# Patient Record
Sex: MALE | Race: WHITE | Employment: FULL TIME | URBAN - METROPOLITAN AREA
[De-identification: names, ages, dates, MRNs, and addresses within clinical notes are randomized per-mention and may not be internally consistent; named-entity substitution may affect disease eponyms.]

---

## 2017-03-10 ENCOUNTER — OFFICE VISIT (OUTPATIENT)
Dept: INTERNAL MEDICINE CLINIC | Age: 42
End: 2017-03-10

## 2017-03-10 VITALS
TEMPERATURE: 98.6 F | OXYGEN SATURATION: 95 % | DIASTOLIC BLOOD PRESSURE: 82 MMHG | HEIGHT: 68 IN | SYSTOLIC BLOOD PRESSURE: 124 MMHG | BODY MASS INDEX: 36.49 KG/M2 | RESPIRATION RATE: 16 BRPM | HEART RATE: 71 BPM | WEIGHT: 240.8 LBS

## 2017-03-10 DIAGNOSIS — M67.431 GANGLION CYST OF WRIST, RIGHT: Primary | ICD-10-CM

## 2017-03-10 NOTE — PROGRESS NOTES
Written by Chrissy Barajas, as dictated by Dr. Tierra Hicks MD.    Cydney Seymour is a 35 y.o. male. HPI  The patient comes in today C/O lump on his R wrist. This summer it first appeared and then after about a week the cyst went away. It was not tender at that time. It came back about 2 weeks ago and it has not gone away. He has noticed mild tenderness this time. The tenderness only occurs when he is exercising. He denies any pressure sxs such as tinging in his fingers. Patient Active Problem List   Diagnosis Code    Obesity (BMI 30-39. 9) E66.9        Current Outpatient Prescriptions on File Prior to Visit   Medication Sig Dispense Refill    ibuprofen (MOTRIN IB) 200 mg tablet Take  by mouth. No current facility-administered medications on file prior to visit. No Known Allergies    History reviewed. No pertinent past medical history. History reviewed. No pertinent surgical history. Family History   Problem Relation Age of Onset    Hypertension Father        Social History     Social History    Marital status:      Spouse name: N/A    Number of children: N/A    Years of education: N/A     Occupational History    Not on file. Social History Main Topics    Smoking status: Never Smoker    Smokeless tobacco: Not on file    Alcohol use Yes      Comment: occationally    Drug use: No    Sexual activity: Yes     Partners: Female     Other Topics Concern    Not on file     Social History Narrative         Review of Systems   HENT: Negative for congestion. Eyes: Negative for blurred vision and discharge. Gastrointestinal: Negative for abdominal pain and heartburn. Musculoskeletal: Negative for joint pain and myalgias. Neurological: Negative for dizziness, tingling, sensory change and headaches.      Visit Vitals    /82 (BP 1 Location: Right arm, BP Patient Position: Sitting)    Pulse 71    Temp 98.6 °F (37 °C) (Oral)  Resp 16    Ht 5' 8\" (1.727 m)    Wt 240 lb 12.8 oz (109.2 kg)    SpO2 95%    BMI 36.61 kg/m2     Physical Exam   Constitutional: He is oriented to person, place, and time. He appears well-nourished. HENT:   Right Ear: External ear normal.   Left Ear: External ear normal.   Mouth/Throat: Oropharynx is clear and moist.   Eyes: Conjunctivae and EOM are normal.   Neck: Normal range of motion. Neck supple. Cardiovascular: Normal rate and regular rhythm. Pulmonary/Chest: Effort normal and breath sounds normal.   Abdominal: Soft. Bowel sounds are normal.   Musculoskeletal: He exhibits no edema. ganglionic cyst on R wrist    Neurological: He is alert and oriented to person, place, and time. Psychiatric: He has a normal mood and affect. Nursing note and vitals reviewed. ASSESSMENT and PLAN    ICD-10-CM ICD-9-CM    1. Ganglion cyst of wrist, right M67.431 727.41 I discussed that I do not suggest any surgery unless he has pressure sxs. I suggested that when it gets bigger, then I want him to do hot compresses. This plan was reviewed with the patient and patient agrees. All questions were answered. This scribe documentation was reviewed by me and accurately reflects the examination and decisions made by me. This note will not be viewable in [address]

## 2017-03-10 NOTE — MR AVS SNAPSHOT
Visit Information Date & Time Provider Department Dept. Phone Encounter #  
 3/10/2017 12:15 PM Alberto Mock MD [facility] Internal Medicine [phone] [id number] Upcoming Health Maintenance Date Due DTaP/Tdap/Td series (1 - Tdap) 7/5/1994 Allergies as of 3/10/2017  Review Complete On: 3/10/2017 By: Alberto Mock MD  
 No Known Allergies Current Immunizations  Never Reviewed No immunizations on file. Not reviewed this visit You Were Diagnosed With   
  
 Codes Comments Ganglion cyst of wrist, right    -  Primary ICD-10-CM: R44.002 ICD-9-CM: 727.41 Vitals BP Pulse Temp Resp Height(growth percentile) Weight(growth percentile) 124/82 (BP 1 Location: Right arm, BP Patient Position: Sitting) 71 98.6 °F (37 °C) (Oral) 16 5' 8\" (1.727 m) 240 lb 12.8 oz (109.2 kg) SpO2 BMI Smoking Status 95% 36.61 kg/m2 Never Smoker BMI and BSA Data Body Mass Index Body Surface Area  
 36.61 kg/m 2 2.29 m 2 Preferred Pharmacy Pharmacy Name Phone [facility] # [address], [address] [phone] Your Updated Medication List  
  
   
This list is accurate as of: 3/10/17  1:10 PM.  Always use your most recent med list.  
  
  
  
  
 MOTRIN  mg tablet Generic drug:  ibuprofen Take  by mouth. Introducing [address]! Jorje Cartagena introduces [employer] patient portal. Now you can access parts of your medical record, email your doctor's office, and request medication refills online. 1. In your internet browser, go to [email or web address] 2. Click on the First Time User? Click Here link in the Sign In box. You will see the New Member Sign Up page. 3. Enter your [employer] Access Code exactly as it appears below. You will not need to use this code after youve completed the sign-up process.  If you do not sign up before the expiration date, you must request a new code. 
 
· [employer] Access Code: [email or web address] Expires: 6/8/2017  1:10 PM 
 
4. Enter the last four digits of your Social Security Number (xxxx) and Date of Birth (mm/dd/yyyy) as indicated and click Submit. You will be taken to the next sign-up page. 5. Create a [employer] ID. This will be your [employer] login ID and cannot be changed, so think of one that is secure and easy to remember. 6. Create a [employer] password. You can change your password at any time. 7. Enter your Password Reset Question and Answer. This can be used at a later time if you forget your password. 8. Enter your e-mail address. You will receive e-mail notification when new information is available in [address] 9. Click Sign Up. You can now view and download portions of your medical record. 10. Click the Download Summary menu link to download a portable copy of your medical information. If you have questions, please visit the Frequently Asked Questions section of the [employer] website. Remember, [employer] is NOT to be used for urgent needs. For medical emergencies, dial 911. Now available from your iPhone and Android! Please provide this summary of care documentation to your next provider. Your primary care clinician is listed as Angelica Tan. If you have any questions after today's visit, please call [phone].

## 2017-03-10 NOTE — PROGRESS NOTES
Chief Complaint   Patient presents with    Other     patient has place on right wrist that is one cm and is mobil. patient states that it comes and goes.

## 2017-06-23 ENCOUNTER — OFFICE VISIT (OUTPATIENT)
Dept: INTERNAL MEDICINE CLINIC | Age: 42
End: 2017-06-23

## 2017-06-23 VITALS
BODY MASS INDEX: 36.37 KG/M2 | WEIGHT: 240 LBS | SYSTOLIC BLOOD PRESSURE: 130 MMHG | TEMPERATURE: 98 F | RESPIRATION RATE: 20 BRPM | DIASTOLIC BLOOD PRESSURE: 88 MMHG | OXYGEN SATURATION: 98 % | HEART RATE: 89 BPM | HEIGHT: 68 IN

## 2017-06-23 DIAGNOSIS — Z00.00 ROUTINE PHYSICAL EXAMINATION: Primary | ICD-10-CM

## 2017-06-23 DIAGNOSIS — E66.9 OBESITY (BMI 30-39.9): ICD-10-CM

## 2017-06-23 NOTE — MR AVS SNAPSHOT
Visit Information Date & Time Provider Department Dept. Phone Encounter #  
 6/23/2017  2:40 PM [facility] Internal Medicine [phone] [id number] Upcoming Health Maintenance Date Due DTaP/Tdap/Td series (1 - Tdap) 7/5/1994 INFLUENZA AGE 9 TO ADULT 8/1/2017 Allergies as of 6/23/2017  Review Complete On: 6/23/2017 By: Roberto Peterson NP No Known Allergies Current Immunizations  Never Reviewed No immunizations on file. Not reviewed this visit You Were Diagnosed With   
  
 Codes Comments Routine physical examination    -  Primary ICD-10-CM: Z00.00 ICD-9-CM: V70.0 Obesity (BMI 30-39. 9)     ICD-10-CM: E66.9 ICD-9-CM: 278.00 Vitals BP Pulse Temp Resp Height(growth percentile) Weight(growth percentile) 130/88 89 98 °F (36.7 °C) (Oral) 20 5' 8\" (1.727 m) 240 lb (108.9 kg) SpO2 BMI Smoking Status 98% 36.49 kg/m2 Never Smoker BMI and BSA Data Body Mass Index Body Surface Area  
 36.49 kg/m 2 2.29 m 2 Preferred Pharmacy Pharmacy Name Phone [id number]  [facility], [address] [facility] [phone] Your Updated Medication List  
  
   
This list is accurate as of: 6/23/17  3:58 PM.  Always use your most recent med list.  
  
  
  
  
 MOTRIN  mg tablet Generic drug:  ibuprofen Take  by mouth. Introducing [facility]! Gentry Estrella introduces [employer] patient portal. Now you can access parts of your medical record, email your doctor's office, and request medication refills online. 1. In your internet browser, go to [email or web address] 2. Click on the First Time User? Click Here link in the Sign In box. You will see the New Member Sign Up page. 3. Enter your [employer] Access Code exactly as it appears below. You will not need to use this code after youve completed the sign-up process.  If you do not sign up before the expiration date, you must request a new code. · [employer] Access Code: [email or web address] Expires: 9/21/2017  2:51 PM 
 
4. Enter the last four digits of your Social Security Number (xxxx) and Date of Birth (mm/dd/yyyy) as indicated and click Submit. You will be taken to the next sign-up page. 5. Create a [employer] ID. This will be your [employer] login ID and cannot be changed, so think of one that is secure and easy to remember. 6. Create a [employer] password. You can change your password at any time. 7. Enter your Password Reset Question and Answer. This can be used at a later time if you forget your password. 8. Enter your e-mail address. You will receive e-mail notification when new information is available in [address] 9. Click Sign Up. You can now view and download portions of your medical record. 10. Click the Download Summary menu link to download a portable copy of your medical information. If you have questions, please visit the Frequently Asked Questions section of the [employer] website. Remember, [employer] is NOT to be used for urgent needs. For medical emergencies, dial 911. Now available from your iPhone and Android! Please provide this summary of care documentation to your next provider. Your primary care clinician is listed as Keren Sanchez. If you have any questions after today's visit, please call [phone].

## 2017-06-23 NOTE — PATIENT INSTRUCTIONS
Thank you for choosing [facility]. We know you have many options when it comes to your healthcare; we appreciate that you picked us. Our goal is to provide exceptional  service and world class care for every patient. You may receive a survey in the mail or by email asking for your feedback. Please take a few minutes to share your thoughts about your recent visit. Your comments helps us understand what we do well and what we can do better. To ensure confidentiality, this survey is administered by an independent third-party, [address] [place] [address] participation will help us to improve the quality of care that we provide to you, your family, friends, and neighbors. Thank you! Body Mass Index: Care Instructions  Your Care Instructions    Body mass index (BMI) can help you see if your weight is raising your risk for health problems. It uses a formula to compare how much you weigh with how tall you are. · A BMI lower than 18.5 is considered underweight. · A BMI between 18.5 and 24.9 is considered healthy. · A BMI between 25 and 29.9 is considered overweight. A BMI of 30 or higher is considered obese. If your BMI is in the normal range, it means that you have a lower risk for weight-related health problems. If your BMI is in the overweight or obese range, you may be at increased risk for weight-related health problems, such as high blood pressure, heart disease, stroke, arthritis or joint pain, and diabetes. If your BMI is in the underweight range, you may be at increased risk for health problems such as fatigue, lower protection (immunity) against illness, muscle loss, bone loss, hair loss, and hormone problems. BMI is just one measure of your risk for weight-related health problems. You may be at higher risk for health problems if you are not active, you eat an unhealthy diet, or you drink too much alcohol or use tobacco products.   Follow-up care is a key part of your treatment and safety. Be sure to make and go to all appointments, and call your doctor if you are having problems. It's also a good idea to know your test results and keep a list of the medicines you take. How can you care for yourself at home? · Practice healthy eating habits. This includes eating plenty of fruits, vegetables, whole grains, lean protein, and low-fat dairy. · If your doctor recommends it, get more exercise. Walking is a good choice. Bit by bit, increase the amount you walk every day. Try for at least 30 minutes on most days of the week. · Do not smoke. Smoking can increase your risk for health problems. If you need help quitting, talk to your doctor about stop-smoking programs and medicines. These can increase your chances of quitting for good. · Limit alcohol to 2 drinks a day for men and 1 drink a day for women. Too much alcohol can cause health problems. If you have a BMI higher than 25  · Your doctor may do other tests to check your risk for weight-related health problems. This may include measuring the distance around your waist. A waist measurement of more than 40 inches in men or 35 inches in women can increase the risk of weight-related health problems. · Talk with your doctor about steps you can take to stay healthy or improve your health. You may need to make lifestyle changes to lose weight and stay healthy, such as changing your diet and getting regular exercise. If you have a BMI lower than 18.5  · Your doctor may do other tests to check your risk for health problems. · Talk with your doctor about steps you can take to stay healthy or improve your health. You may need to make lifestyle changes to gain or maintain weight and stay healthy, such as getting more healthy foods in your diet and doing exercises to build muscle. Where can you learn more? Go to http://ronaldo-jackie.info/.   Enter S176 in the search box to learn more about \"Body Mass Index: Care Instructions. \"  Current as of: January 23, 2017  Content Version: 11.3  © 9639-3034 [employer]. Care instructions adapted under license by [employer] (which disclaims liability or warranty for this information). If you have questions about a medical condition or this instruction, always ask your healthcare professional. [address] any warranty or liability for your use of this information. Learning About Low-Carbohydrate Diets for Weight Loss  What is a low-carbohydrate diet? Low-carb diets avoid foods that are high in carbohydrate. These high-carb foods include pasta, bread, rice, cereal, fruits, and starchy vegetables. Instead, these diets usually have you eat foods that are high in fat and protein. Many people lose weight quickly on a low-carb diet. But the early weight loss is water. People on this diet often gain the weight back after they start eating carbs again. Not all diet plans are safe or work well. A lot of the evidence shows that low-carb diets aren't healthy. That's because these diets often don't include healthy foods like fruits and vegetables. Losing weight safely means balancing protein, fat, and carbs with every meal and snack. And low-carb diets don't always provide the vitamins, minerals, and fiber you need. If you have a serious medical condition, talk to your doctor before you try any diet. These conditions include kidney disease, heart disease, type 2 diabetes, high cholesterol, and high blood pressure. If you are pregnant, it may not be safe for your baby if you are on a low-carb diet. How can you lose weight safely? You might have heard that a diet plan helped another person lose weight. But that doesn't mean that it will work for you. It is very hard to stay on a diet that includes lots of big changes in your eating habits.  If you want to get to a healthy weight and stay there, making healthy lifestyle changes will often work better than dieting. These steps can help. · Make a plan for change. Work with your doctor to create a plan that is right for you. · See a dietitian. He or she can show you how to make healthy changes in your eating habits. · Manage stress. If you have a lot of stress in your life, it can be hard to focus on making healthy changes to your daily habits. · Track your food and activity. You are likely to do better at losing weight if you keep track of what you eat and what you do. Follow-up care is a key part of your treatment and safety. Be sure to make and go to all appointments, and call your doctor if you are having problems. It's also a good idea to know your test results and keep a list of the medicines you take. Where can you learn more? Go to http://ronaldo-jackie.info/. Enter A121 in the search box to learn more about \"Learning About Low-Carbohydrate Diets for Weight Loss. \"  Current as of: December 8, 2016  Content Version: 11.3  © 6414-7738 [employer]. Care instructions adapted under license by [employer] (which disclaims liability or warranty for this information). If you have questions about a medical condition or this instruction, always ask your healthcare professional. [address] any warranty or liability for your use of this information. When You Are Overweight: Care Instructions  Your Care Instructions  If you're overweight, your doctor may recommend that you make changes in your eating and exercise habits. Being overweight can lead to serious health problems, such as high blood pressure, heart disease, type 2 diabetes, and arthritis, or it can make these problems worse. Eating a healthy diet and being more active can help you reach and stay at a healthy weight. You dont have to make huge changes all at once. Start by making small changes in your eating and exercise habits.  To lose weight, you need to burn more calories than you take in. You can do this by eating healthy foods in reasonable amounts and becoming more active every day. Follow-up care is a key part of your treatment and safety. Be sure to make and go to all appointments, and call your doctor if you are having problems. It's also a good idea to know your test results and keep a list of the medicines you take. How can you care for yourself at home? · Improve your eating habits. You'll be more successful if you work on changing one eating habit at a time. All foods, if eaten in moderation, can be part of healthy eating. Remember to:  [facility] a variety of foods from each food group. Include grains, vegetables, fruits, dairy, and protein foods. ¨ Limit foods high in fat, sugar, and calories. ¨ Eat slowly. And don't do anything else, such as watch TV, while you are eating. ¨ Pay attention to portion sizes. Put your food on a smaller plate. ¨ Plan your meals ahead of time. You'll be less likely to grab something that's not as healthy. · Get active. Regular activity can help you feel better, have more energy, and burn more calories. If you haven't been active, start slowly. Start with at least 30 minutes of moderate activity on most days of the week. Then gradually increase the amount of activity. Try for 60 or 90 minutes a day, at least 5 days a week. There are a lot of ways to fit activity into your life. You can:  ¨ Walk or bike to the store. Or walk with a friend, or walk the dog. ¨ Mow the lawn, rake leaves, shovel snow, or do some gardening. ¨ Use the stairs instead of the elevator, at least for a few floors. · Change your thinking. Your thoughts have a lot to do with how you feel and what you do. When you're trying to reach a healthy weight, changing how you think about certain things may help. Here are some ideas:  ¨ Don't compare yourself to others. Healthy bodies come in all shapes and sizes. ¨ Pay attention to how hungry or full you feel.  When you eat, be aware of why you're eating and how much you're eating. ¨ Focus on improving your health instead of dieting. Dieting almost never works over the long term. · Ask your doctor about other health professionals who can help you reach a healthy weight. ¨ A dietitian can help you make healthy changes in your diet. ¨ An exercise specialist or  can help you develop a safe and effective exercise program.  ¨ A counselor or psychiatrist can help you cope with issues such as depression, anxiety, or family problems that can make it hard to focus on reaching a healthy weight. · Get support from your family, your doctor, your friends, a support groupand support yourself. Where can you learn more? Go to http://ronaldoPingCo.comjackie.info/. Enter P631 in the search box to learn more about \"When You Are Overweight: Care Instructions. \"  Current as of: October 13, 2016  Content Version: 11.3  © 7322-2127 [employer]. Care instructions adapted under license by [facility] (which disclaims liability or warranty for this information). If you have questions about a medical condition or this instruction, always ask your healthcare professional. [place] [address] any warranty or liability for your use of this information. Learning About Obesity  What is obesity? Obesity means having so much body fat that your health is in danger. Having too much body fat can lead to type 2 diabetes, heart disease, high blood pressure, arthritis, sleep apnea, and stroke. Even if you don't feel bad now, think about these health risks. Do they seem like a good reason to start on a new path toward a healthier weight? Or do you have another personal, powerful reason for wanting to lose weight? Whatever it is, keep it in mind. It can be hard to change eating habits and exercise habits. But with your own reason and plan, you can do it.   How do you know if your weight is in the obesity range? To know if your weight is in the obesity range, your doctor looks at your body mass index (BMI) and waist size. Your BMI is a number that is calculated from your weight and your height. To figure your BMI for yourself, get a BMI table from your doctor or use an online tool, such as http://www.[employer]/ on the [employer] of [employer]. What causes obesity? When you take in more calories than you burn off, you gain weight. How you eat, how active you are, and other things affect how your body uses calories and whether you gain weight. If you have family members who have too much body fat, you may have inherited a tendency to gain weight. And your family also helps form your eating and lifestyle habits, which can lead to obesity. Also, our busy lives make it harder to plan and cook healthy meals. For many of us, it's easier to reach for prepared foods, go out to eat, or go to the drive-through. But these foods are often high in saturated fat and calories. Portions are often too large. What can you do to reach a healthy weight? Focus on health, not diets. Diets are hard to stay on and don't work in the long run. It is very hard to stay with a diet that includes lots of big changes in your eating habits. Instead of a diet, focus on lifestyle changes that will improve your health and achieve the right balance of energy and calories. To lose weight, you need to burn more calories than you take in. You can do it by eating healthy foods in reasonable amounts and becoming more active, even a little bit every day. Making small changes over time can add up to a lot. Make a plan for change. Many people have found that naming their reasons for change and staying focused on their plan can make a big difference. Work with your doctor to create a plan that is right for you.   · Ask yourself: Benjamin Ohms are my personal, most powerful reasons for wanting this change? What will my life look like when I've made the change? \"  · Set your long-term goal. Make it specific, such as \"I will lose x pounds. \"  · Break your long-term goal into smaller, short-term goals. Make these small steps specific and within your reach, things you know you can do. These steps are what keep you going from day to day. How can you stay on your plan for change? Be ready. Choose to start during a time when there are few events that might trigger slip-ups, like holidays, social events, and high-stress periods. Decide on your first few steps. Most people have more success when they make small changes, one step at a time. For example, you might switch a daily candy bar to a piece of fruit, walk 10 minutes more, or add more vegetables to a meal.  Line up your support people. Make sure you're not going to be alone as you make this change. Connect with people who understand how important it is to you. Ask family members and friends for help in keeping with your plan. And think about who could make it harder for you, and how to handle them. Try tracking. People who keep track of what they eat, feel, and do are better at losing weight. Try writing down things like:  · What and how much you eat. · How you feel before and after each meal.  · Details about each meal (like eating out or at home, eating alone, or with friends or family). · What you do to be active. Look and plan. As you track, look for patterns that you may want to change. Take note of:  · When you eat and whether you skip meals. · How often you eat out. · How many fruits and vegetables you eat. · When you eat beyond feeling full. · When and why you eat for reasons other than being hungry. When you stray from your plan, don't get upset. Figure out what made you slip up and how you can fix it. Can you take medicines or have surgery to lose weight?   Before your doctor will prescribe medicines or surgery, he or she will probably want you to be more active and follow your healthy eating plan for a period of time. These habits are key lifelong changes for managing your weight, with or without other medical treatment. And these changes can help you avoid weight-related health problems. Follow-up care is a key part of your treatment and safety. Be sure to make and go to all appointments, and call your doctor if you are having problems. It's also a good idea to know your test results and keep a list of the medicines you take. Where can you learn more? Go to http://ronaldo-jackie.info/. Enter N111 in the search box to learn more about \"Learning About Obesity. \"  Current as of: October 13, 2016  Content Version: 11.3  © 8148-3683 [employer]. Care instructions adapted under license by [employer] (which disclaims liability or warranty for this information). If you have questions about a medical condition or this instruction, always ask your healthcare professional. [place] [address] any warranty or liability for your use of this information. Starting a Weight Loss Plan: Care Instructions  Your Care Instructions  If you are thinking about losing weight, it can be hard to know where to start. Your doctor can help you set up a weight loss plan that best meets your needs. You may want to take a class on nutrition or exercise, or join a weight loss support group. If you have questions about how to make changes to your eating or exercise habits, ask your doctor about seeing a registered dietitian or an exercise specialist.  It can be a big challenge to lose weight. But you do not have to make huge changes at once. Make small changes, and stick with them. When those changes become habit, add a few more changes. If you do not think you are ready to make changes right now, try to pick a date in the future.  Make an appointment to see your doctor to discuss whether the time is right for you to start a plan. Follow-up care is a key part of your treatment and safety. Be sure to make and go to all appointments, and call your doctor if you are having problems. Its also a good idea to know your test results and keep a list of the medicines you take. How can you care for yourself at home? · Set realistic goals. Many people expect to lose much more weight than is likely. A weight loss of 5% to 10% of your body weight may be enough to improve your health. · Get family and friends involved to provide support. Talk to them about why you are trying to lose weight, and ask them to help. They can help by participating in exercise and having meals with you, even if they may be eating something different. · Find what works best for you. If you do not have time or do not like to cook, a program that offers meal replacement bars or shakes may be better for you. Or if you like to prepare meals, finding a plan that includes daily menus and recipes may be best.  · Ask your doctor about other health professionals who can help you achieve your weight loss goals. ¨ A dietitian can help you make healthy changes in your diet. ¨ An exercise specialist or  can help you develop a safe and effective exercise program.  ¨ A counselor or psychiatrist can help you cope with issues such as depression, anxiety, or family problems that can make it hard to focus on weight loss. · Consider joining a support group for people who are trying to lose weight. Your doctor can suggest groups in your area. Where can you learn more? Go to http://ronaldo-jackie.info/. Enter G986 in the search box to learn more about \"Starting a Weight Loss Plan: Care Instructions. \"  Current as of: October 13, 2016  Content Version: 11.3  © 3479-8227 [employer]. Care instructions adapted under license by [employer] (which disclaims liability or warranty for this information).  If you have questions about a medical condition or this instruction, always ask your healthcare professional. [address] any warranty or liability for your use of this information. Well Visit, Ages 25 to 48: Care Instructions  Your Care Instructions  Physical exams can help you stay healthy. Your doctor has checked your overall health and may have suggested ways to take good care of yourself. He or she also may have recommended tests. At home, you can help prevent illness with healthy eating, regular exercise, and other steps. Follow-up care is a key part of your treatment and safety. Be sure to make and go to all appointments, and call your doctor if you are having problems. It's also a good idea to know your test results and keep a list of the medicines you take. How can you care for yourself at home? · Reach and stay at a healthy weight. This will lower your risk for many problems, such as obesity, diabetes, heart disease, and high blood pressure. · Get at least 30 minutes of physical activity on most days of the week. Walking is a good choice. You also may want to do other activities, such as running, swimming, cycling, or playing tennis or team sports. Discuss any changes in your exercise program with your doctor. · Do not smoke or allow others to smoke around you. If you need help quitting, talk to your doctor about stop-smoking programs and medicines. These can increase your chances of quitting for good. · Talk to your doctor about whether you have any risk factors for sexually transmitted infections (STIs). Having one sex partner (who does not have STIs and does not have sex with anyone else) is a good way to avoid these infections. · Use birth control if you do not want to have children at this time. Talk with your doctor about the choices available and what might be best for you. · Protect your skin from too much sun.  When you're outdoors from 10 a.m. to 4 p.m., stay in the shade or cover up with clothing and a hat with a wide brim. Wear sunglasses that block UV rays. Even when it's cloudy, put broad-spectrum sunscreen (SPF 30 or higher) on any exposed skin. · See a dentist one or two times a year for checkups and to have your teeth cleaned. · Wear a seat belt in the car. · Drink alcohol in moderation, if at all. That means no more than 2 drinks a day for men and 1 drink a day for women. Follow your doctor's advice about when to have certain tests. These tests can spot problems early. For everyone  · Cholesterol. Have the fat (cholesterol) in your blood tested after age 21. Your doctor will tell you how often to have this done based on your age, family history, or other things that can increase your risk for heart disease. · Blood pressure. Have your blood pressure checked during a routine doctor visit. Your doctor will tell you how often to check your blood pressure based on your age, your blood pressure results, and other factors. · Vision. Talk with your doctor about how often to have a glaucoma test.  · Diabetes. Ask your doctor whether you should have tests for diabetes. · Colon cancer. Have a test for colon cancer at age 48. You may have one of several tests. If you are younger than 48, you may need a test earlier if you have any risk factors. Risk factors include whether you already had a precancerous polyp removed from your colon or whether your parent, brother, sister, or child has had colon cancer. For women  · Breast exam and mammogram. Talk to your doctor about when you should have a clinical breast exam and a mammogram. Medical experts differ on whether and how often women under 50 should have these tests. Your doctor can help you decide what is right for you. · Pap test and pelvic exam. Begin Pap tests at age 24. A Pap test is the best way to find cervical cancer.  The test often is part of a pelvic exam. Ask how often to have this test.  · Tests for sexually transmitted infections (STIs). Ask whether you should have tests for STIs. You may be at risk if you have sex with more than one person, especially if your partners do not wear condoms. For men  · Tests for sexually transmitted infections (STIs). Ask whether you should have tests for STIs. You may be at risk if you have sex with more than one person, especially if you do not wear a condom. · Testicular cancer exam. Ask your doctor whether you should check your testicles regularly. · Prostate exam. Talk to your doctor about whether you should have a blood test (called a PSA test) for prostate cancer. Experts differ on whether and when men should have this test. Some experts suggest it if you are older than 39 and are -American or have a father or brother who got prostate cancer when he was younger than 72. When should you call for help? Watch closely for changes in your health, and be sure to contact your doctor if you have any problems or symptoms that concern you. Where can you learn more? Go to [email or web address] Enter P072 in the search box to learn more about \"Well Visit, Ages 25 to 48: Care Instructions. \"  Current as of: July 19, 2016  Content Version: 11.3  © 6590-9311 [employer]. Care instructions adapted under license by [employer] (which disclaims liability or warranty for this information). If you have questions about a medical condition or this instruction, always ask your healthcare professional. [place] [address] any warranty or liability for your use of this information.

## 2017-06-23 NOTE — PROGRESS NOTES
Subjective:   Michelle Pritchett is a 35 y.o. male presenting for his annual checkup. He is in his usual state of health. He exercises (cardio and weight) at least 3 times weekly and recently participated in a \"tough-mudder\" exercise program. He denies any symptoms at this time and is not fasting so he will return next week fasting for his labs. We will complete his physical form for work when he brings it in next week. Denies any concerns or symptoms. ROS:  Feeling well. No dyspnea or chest pain on exertion. No abdominal pain, change in bowel habits, black or bloody stools. No urinary tract or prostatic symptoms. No neurological complaints. Specific concerns today: None. Patient Active Problem List   Diagnosis Code    Obesity (BMI 30-39. 9) E66.9     Patient Active Problem List    Diagnosis Date Noted    Obesity (BMI 30-39.9) 05/09/2016     Current Outpatient Prescriptions   Medication Sig Dispense Refill    ibuprofen (MOTRIN IB) 200 mg tablet Take  by mouth. No Known Allergies  No past medical history on file. History reviewed. No pertinent surgical history.   Family History   Problem Relation Age of Onset    Hypertension Father      Social History   Substance Use Topics    Smoking status: Never Smoker    Smokeless tobacco: Never Used    Alcohol use Yes      Comment: occationally        Lab Results  Component Value Date/Time   WBC 6.7 05/09/2016 09:04 AM   HGB 15.9 05/09/2016 09:04 AM   HCT 46.1 05/09/2016 09:04 AM   PLATELET 791 21/05/2041 09:04 AM   MCV 88 05/09/2016 09:04 AM     Lab Results  Component Value Date/Time   Glucose 100 05/09/2016 09:04 AM   LDL, calculated 158 05/09/2016 09:04 AM   Creatinine 0.85 05/09/2016 09:04 AM      Lab Results  Component Value Date/Time   Cholesterol, total 236 05/09/2016 09:04 AM   HDL Cholesterol 57 05/09/2016 09:04 AM   LDL, calculated 158 05/09/2016 09:04 AM   Triglyceride 104 05/09/2016 09:04 AM   Lab Results  Component Value Date/Time   ALT (SGPT) 28 05/09/2016 09:04 AM   AST (SGOT) 31 05/09/2016 09:04 AM   Alk. phosphatase 59 05/09/2016 09:04 AM   Bilirubin, total 0.5 05/09/2016 09:04 AM   Albumin 4.8 05/09/2016 09:04 AM   Protein, total 7.2 05/09/2016 09:04 AM   PLATELET 187 45/06/2895 09:04 AM       No results found for: INR, PTMR, PTP, PT1, PT2   Lab Results  Component Value Date/Time   GFR est non- 05/09/2016 09:04 AM   GFR est  05/09/2016 09:04 AM   Creatinine 0.85 05/09/2016 09:04 AM   BUN 15 05/09/2016 09:04 AM   Sodium 138 05/09/2016 09:04 AM   Potassium 4.8 05/09/2016 09:04 AM   Chloride 99 05/09/2016 09:04 AM   CO2 23 05/09/2016 09:04 AM   No results found for: PSA, Amber King, [id number], [id number], PSALTLab Results  Component Value Date/Time   TSH 1.900 05/09/2016 09:04 AM      Lab Results   Component Value Date/Time    Glucose 100 05/09/2016 09:04 AM     Patient Active Problem List   Diagnosis Code    Obesity (BMI 30-39. 9) E66.9     No Known Allergies  Current Outpatient Prescriptions on File Prior to Visit   Medication Sig Dispense Refill    ibuprofen (MOTRIN IB) 200 mg tablet Take  by mouth. No current facility-administered medications on file prior to visit. No past medical history on file. History reviewed. No pertinent surgical history. Social History     Social History    Marital status:      Spouse name: N/A    Number of children: N/A    Years of education: N/A     Occupational History    Not on file. Social History Main Topics    Smoking status: Never Smoker    Smokeless tobacco: Never Used    Alcohol use Yes      Comment: occationally    Drug use: No    Sexual activity: Yes     Partners: Female     Other Topics Concern    Not on file     Social History Narrative     Family History   Problem Relation Age of Onset    Hypertension Father      This note will not be viewable in [address]     If Narcotics or controlled substances were prescribed, I personally reviewed the patient  history. Objective:   Visit Vitals    /88    Pulse 89    Temp 98 °F (36.7 °C) (Oral)    Resp 20    Ht 5' 8\" (1.727 m)    Wt 240 lb (108.9 kg)    SpO2 98%    BMI 36.49 kg/m2     The patient appears well, alert, oriented x 3, in no distress. ENT normal.  Neck supple. No adenopathy or thyromegaly. SUNDAY. Lungs are clear, good air entry, no wheezes, rhonchi or rales. S1 and S2 normal, no murmurs, regular rate and rhythm. Abdomen is soft without tenderness, guarding, mass or organomegaly.  exam: no penile lesions or discharge, no testicular masses or tenderness, no hernias. Extremities show no edema, normal peripheral pulses. Neurological is normal without focal findings. Assessment/Plan:   [facility] adult male examination completed. Encouraged to lose weight, increase physical activity, follow low fat diet and continue exercise regimen. Routine labs ordered and he will return and labs drawn next week when fasting. Encouraged to call if any problems. Patient expressed understanding of instructions and agreement with treatment plan. ICD-10-CM ICD-9-CM    1. Routine physical examination Z00.00 V70.0    2. Obesity (BMI 30-39. 9) E66.9 278.00    .

## 2017-06-30 ENCOUNTER — DOCUMENTATION ONLY (OUTPATIENT)
Dept: INTERNAL MEDICINE CLINIC | Age: 42
End: 2017-06-30

## 2017-06-30 DIAGNOSIS — Z00.00 PHYSICAL EXAM: Primary | ICD-10-CM

## 2017-06-30 LAB
BILIRUB UR QL STRIP: NEGATIVE
GLUCOSE UR-MCNC: NEGATIVE MG/DL
KETONES P FAST UR STRIP-MCNC: NEGATIVE MG/DL
PH UR STRIP: 5.5 [PH] (ref 4.6–8)
PROT UR QL STRIP: NEGATIVE MG/DL
SP GR UR STRIP: 1.02 (ref 1–1.03)
UA UROBILINOGEN AMB POC: NORMAL (ref 0.2–1)
URINALYSIS CLARITY POC: CLEAR
URINALYSIS COLOR POC: YELLOW
URINE BLOOD POC: NEGATIVE
URINE LEUKOCYTES POC: NEGATIVE
URINE NITRITES POC: NEGATIVE

## 2017-06-30 NOTE — PROGRESS NOTES
Patient came in for labs, from physical on 6/23/2017. Patient had wellness physical physician form. Completed form made copy, gave to patient and sent copy to scan.

## 2017-07-01 LAB
25(OH)D3+25(OH)D2 SERPL-MCNC: 40.7 NG/ML (ref 30–100)
ALBUMIN SERPL-MCNC: 4.5 G/DL (ref 3.5–5.5)
ALBUMIN/GLOB SERPL: 1.8 {RATIO} (ref 1.2–2.2)
ALP SERPL-CCNC: 57 IU/L (ref 39–117)
ALT SERPL-CCNC: 32 IU/L (ref 0–44)
AST SERPL-CCNC: 31 IU/L (ref 0–40)
BILIRUB SERPL-MCNC: 0.6 MG/DL (ref 0–1.2)
BUN SERPL-MCNC: 21 MG/DL (ref 6–24)
BUN/CREAT SERPL: 23 (ref 9–20)
CALCIUM SERPL-MCNC: 9.2 MG/DL (ref 8.7–10.2)
CHLORIDE SERPL-SCNC: 103 MMOL/L (ref 96–106)
CHOLEST SERPL-MCNC: 220 MG/DL (ref 100–199)
CO2 SERPL-SCNC: 16 MMOL/L (ref 18–29)
CREAT SERPL-MCNC: 0.9 MG/DL (ref 0.76–1.27)
ERYTHROCYTE [DISTWIDTH] IN BLOOD BY AUTOMATED COUNT: 13.9 % (ref 12.3–15.4)
GLOBULIN SER CALC-MCNC: 2.5 G/DL (ref 1.5–4.5)
GLUCOSE SERPL-MCNC: 108 MG/DL (ref 65–99)
HCT VFR BLD AUTO: 42.2 % (ref 37.5–51)
HDLC SERPL-MCNC: 45 MG/DL
HGB BLD-MCNC: 14.6 G/DL (ref 12.6–17.7)
INTERPRETATION, 910389: NORMAL
LDLC SERPL CALC-MCNC: 156 MG/DL (ref 0–99)
MCH RBC QN AUTO: 30.5 PG (ref 26.6–33)
MCHC RBC AUTO-ENTMCNC: 34.6 G/DL (ref 31.5–35.7)
MCV RBC AUTO: 88 FL (ref 79–97)
PLATELET # BLD AUTO: 266 X10E3/UL (ref 150–379)
POTASSIUM SERPL-SCNC: 3.9 MMOL/L (ref 3.5–5.2)
PROT SERPL-MCNC: 7 G/DL (ref 6–8.5)
RBC # BLD AUTO: 4.78 X10E6/UL (ref 4.14–5.8)
SODIUM SERPL-SCNC: 140 MMOL/L (ref 134–144)
TRIGL SERPL-MCNC: 96 MG/DL (ref 0–149)
TSH SERPL DL<=0.005 MIU/L-ACNC: 1.89 UIU/ML (ref 0.45–4.5)
VLDLC SERPL CALC-MCNC: 19 MG/DL (ref 5–40)
WBC # BLD AUTO: 7.3 X10E3/UL (ref 3.4–10.8)

## 2018-06-28 ENCOUNTER — OFFICE VISIT (OUTPATIENT)
Dept: INTERNAL MEDICINE CLINIC | Age: 43
End: 2018-06-28

## 2018-06-28 VITALS
HEIGHT: 68 IN | RESPIRATION RATE: 16 BRPM | DIASTOLIC BLOOD PRESSURE: 78 MMHG | SYSTOLIC BLOOD PRESSURE: 130 MMHG | BODY MASS INDEX: 34.19 KG/M2 | TEMPERATURE: 98.5 F | WEIGHT: 225.6 LBS | OXYGEN SATURATION: 99 % | HEART RATE: 95 BPM

## 2018-06-28 DIAGNOSIS — Z23 NEED FOR VACCINE FOR TD (TETANUS-DIPHTHERIA): ICD-10-CM

## 2018-06-28 DIAGNOSIS — E66.9 OBESITY (BMI 30-39.9): ICD-10-CM

## 2018-06-28 DIAGNOSIS — Z00.00 PHYSICAL EXAM: Primary | ICD-10-CM

## 2018-06-28 DIAGNOSIS — R53.82 CHRONIC FATIGUE: ICD-10-CM

## 2018-06-28 DIAGNOSIS — R73.01 ELEVATED FASTING BLOOD SUGAR: ICD-10-CM

## 2018-06-28 RX ORDER — GLUCOSAMINE SULFATE 1500 MG
POWDER IN PACKET (EA) ORAL DAILY
COMMUNITY
End: 2020-03-19

## 2018-06-28 NOTE — PROGRESS NOTES
Chief Complaint   Patient presents with    Complete Physical     states that he has some concerns about weight, is working out and has lost weight but thinks maybe his testosterone level is low.  knows that he is burning more calories but has cut out fruits and carbs.

## 2018-06-28 NOTE — PROGRESS NOTES
Written by Severiano Vega, as dictated by Dr. Samantha Oglesby MD.    Lazarus Hillier is a 38 y.o. male. HPI  The patient presents today for a complete physical.    He was seen in the office c/o ganglion cyst on his right hand, which he treats at home with hot compresses. He weighs 225 lbs today and has lost 15 lbs from 240 lbs in 06/2017. He has been working out and mostly eliminating carbohydrates and sugars from his diet. Additionally, he eats mostly protein and healthy fats. He has only been eating fruits 2 times per week. He also has tried intermittent fasting, which is helping him to lose weight. On the weekends, he allows himself one day to stray from his diet and drinks 3-4 alcoholic drinks per week. He is still struggling to lose weight as he tends to stay between 230-240 lbs, but states he is now 30 lbs from his goal weight. He asked if he is eligible TRT to help him lose weight. He also reports feeling more tired now than when he was younger. He previously had sciatica pain, which has improved with his weight loss. He had a cold and sore throat that started on 06/25. On 06/26 he developed a runny nose and has been coughing and congested. He has noticed that his hands swell some when he eats sodium and has heavy cardio days. He denies heartburn, constipation, SOB, chest pain. He does not smoke now, but he quit smoking 10-12 years ago. Sometimes he feels fresh in the morning, but he does not know if he snores at night. He was taking a multivitamin last year, but stopped taking it. He has been taking fish oil, an OTC daily allergy medication, and vitamin D. He is travelling to [place] today for his son's wedding. He does not recall receiving a Tdap vaccine in the last 5 years. Patient Active Problem List   Diagnosis Code    Obesity (BMI 30-39. 9) E66.9        Current Outpatient Prescriptions on File Prior to Visit   Medication Sig Dispense Refill    ibuprofen (MOTRIN IB) 200 mg tablet Take  by mouth. No current facility-administered medications on file prior to visit. Family History   Problem Relation Age of Onset    Hypertension Father        Social History     Social History    Marital status:      Spouse name: N/A    Number of children: N/A    Years of education: N/A     Occupational History    Not on file. Social History Main Topics    Smoking status: Never Smoker    Smokeless tobacco: Never Used    Alcohol use Yes      Comment: occationally    Drug use: No    Sexual activity: Yes     Partners: Female     Other Topics Concern    Not on file     Social History Narrative       Review of Systems   Constitutional: Positive for malaise/fatigue. HENT: Positive for congestion and sore throat. Eyes: Negative for blurred vision and pain. Respiratory: Positive for cough. Negative for shortness of breath. Cardiovascular: Negative for chest pain and palpitations. Gastrointestinal: Negative for abdominal pain and heartburn. Genitourinary: Negative for frequency and urgency. Musculoskeletal: Negative for joint pain and myalgias. Neurological: Negative for dizziness, tingling, sensory change, weakness and headaches. Psychiatric/Behavioral: Negative for depression, memory loss and substance abuse. Visit Vitals    /78 (BP 1 Location: Right arm, BP Patient Position: Sitting)    Pulse 95    Temp 98.5 °F (36.9 °C) (Oral)    Resp 16    Ht 5' 8\" (1.727 m)    Wt 225 lb 9.6 oz (102.3 kg)    SpO2 99%    BMI 34.3 kg/m2       Physical Exam   Constitutional: He is oriented to person, place, and time. He appears well-developed. No distress. Obese   HENT:   Right Ear: External ear normal.   Left Ear: External ear normal.   Eyes: Conjunctivae and EOM are normal. Right eye exhibits no discharge. Left eye exhibits no discharge. Neck: Normal range of motion. Neck supple.    Cardiovascular: Normal rate and regular rhythm. Pulses:       Dorsalis pedis pulses are 2+ on the right side, and 1+ on the left side. Posterior tibial pulses are 2+ on the left side. Pulmonary/Chest: Effort normal. He has no wheezes. Coarse breath sounds in L middle lobe   Abdominal: Soft. Bowel sounds are normal. There is no tenderness. Lymphadenopathy:     He has no cervical adenopathy. Neurological: He is alert and oriented to person, place, and time. Reflex Scores:       Patellar reflexes are 2+ on the right side and 2+ on the left side. Skin: He is not diaphoretic. Psychiatric: He has a normal mood and affect. His behavior is normal.   Nursing note and vitals reviewed. ASSESSMENT and PLAN    ICD-10-CM ICD-9-CM    1. Physical exam Z00.00 V70.9 CBC W/O DIFF      METABOLIC PANEL, COMPREHENSIVE      LIPID PANEL      TSH 3RD GENERATION   2. Chronic fatigue R53.82 780.79 TESTOSTERONE, FREE & TOTAL   I will check his testosterone levels. 3. Elevated fasting blood sugar R73.01 790.21 HEMOGLOBIN A1C WITH EAG    Complete physical exam done. Basic labs drawn. Azithromycin prescription given to patient as he has had a cold and will be out of state this weekend. 4. Obesity (BMI 30-39. 9) E66.9 278.00 He is currently maintaining a healthy diet an exercising. I encourage him to maintain this lifestyle, but he should make sure to eat fruits such as apples and berries. Commended on his weight loss. 5. Need for vaccine for Td (tetanus-diphtheria) Z23 V06.5 diph,Pertuss,Acell,,Tet Vac-PF (ADACEL) 2 Lf-(2.5-5-3-5 mcg)-5Lf/0.5 mL susp sent to pharmacy. Tdap vaccine ordered. This plan was reviewed with the patient and patient agrees. All questions were answered. This scribe documentation was reviewed by me and accurately reflects the examination and decisions made by me.

## 2018-06-28 NOTE — MR AVS SNAPSHOT
[address] [facility] [address] [place] [address] [address] [facility] 
[phone] Patient: Bairon Tarango MRN: [id number] ZFD:[birth year] Visit Information Date & Time Provider Department Dept. Phone Encounter #  
 6/28/2018  8:30 AM Vijay Muniz MD [facility] Internal Medicine [phone] [id number] Upcoming Health Maintenance Date Due DTaP/Tdap/Td series (1 - Tdap) 7/5/1994 Influenza Age 5 to Adult 8/1/2018 Allergies as of 6/28/2018  Review Complete On: 6/28/2018 By: Marleny Oh LPN No Known Allergies Current Immunizations  Reviewed on 6/28/2018 Name Date Tdap 6/28/2018 Reviewed by Vijay Muniz MD on 6/28/2018 at  9:16 AM  
You Were Diagnosed With   
  
 Codes Comments Physical exam    -  Primary ICD-10-CM: Z00.00 ICD-9-CM: V70.9 Chronic fatigue     ICD-10-CM: R53.82 
ICD-9-CM: 780.79 Elevated fasting blood sugar     ICD-10-CM: R73.01 
ICD-9-CM: 790.21 Obesity (BMI 30-39. 9)     ICD-10-CM: E66.9 ICD-9-CM: 278.00 Need for vaccine for Td (tetanus-diphtheria)     ICD-10-CM: Mer  ICD-9-CM: V06.5 Vitals BP Pulse Temp Resp Height(growth percentile) Weight(growth percentile) 130/78 (BP 1 Location: Right arm, BP Patient Position: Sitting) 95 98.5 °F (36.9 °C) (Oral) 16 5' 8\" (1.727 m) 225 lb 9.6 oz (102.3 kg) SpO2 BMI Smoking Status 99% 34.3 kg/m2 Never Smoker BMI and BSA Data Body Mass Index Body Surface Area  
 34.3 kg/m 2 2.22 m 2 Preferred Pharmacy Pharmacy Name [id number] - [place]m, [address] [facility] [phone] Your Updated Medication List  
  
   
This list is accurate as of 6/28/18  9:42 AM.  Always use your most recent med list.  
  
  
  
  
 diph,Pertuss(Acell),Tet Vac-PF 2 Lf-(2.5-5-3-5 mcg)-5Lf/0.5 mL susp Commonly known as:  ADACEL  
0.5 mL by IntraMUSCular route once for 1 dose. FISH -160-1,000 mg Cap Generic drug:  omega 3-dha-epa-fish oil Take  by mouth. MOTRIN  mg tablet Generic drug:  ibuprofen Take  by mouth. VITAMIN D3 1,000 unit Cap Generic drug:  cholecalciferol Take  by mouth daily. Prescriptions Sent to Pharmacy Refills diph,Pertuss,Acell,,Tet Vac-PF (ADACEL) 2 Lf-(2.5-5-3-5 mcg)-5Lf/0.5 mL susp 0 Si.5 mL by IntraMUSCular route once for 1 dose. Class: Normal  
 Pharmacy: [address] [facility] #: [phone] Route: IntraMUSCular We Performed the Following CBC W/O DIFF [78481 CPT(R)] HEMOGLOBIN A1C WITH EAG [44590 CPT(R)] LIPID PANEL [99657 CPT(R)] METABOLIC PANEL, COMPREHENSIVE [09052 CPT(R)] TESTOSTERONE, FREE & TOTAL [53237 CPT(R)] TSH 3RD GENERATION [96136 CPT(R)] Introducing [facility]! [employer] introduces [employer]en patient portal. Now you can access parts of your medical record, email your doctor's office, and request medication refills online. 1. In your internet browser, go to [email or web address] 2. Click on the First Time User? Click Here link in the Sign In box. You will see the New Member Sign Up page. 3. Enter your Pollen Access Code exactly as it appears below. You will not need to use this code after youve completed the sign-up process. If you do not sign up before the expiration date, you must request a new code. · Pollen Access Code: [email or web address] Expires: 2018  9:30 AM 
 
4. Enter the last four digits of your Social Security Number (xxxx) and Date of Birth (mm/dd/yyyy) as indicated and click Submit. You will be taken to the next sign-up page. 5. Create a [employer] ID. This will be your Pollen login ID and cannot be changed, so think of one that is secure and easy to remember. 6. Create a Pollen password. You can change your password at any time. 7. Enter your Password Reset Question and Answer. This can be used at a later time if you forget your password. 8. Enter your e-mail address. You will receive e-mail notification when new information is available in [address] 9. Click Sign Up. You can now view and download portions of your medical record. 10. Click the Download Summary menu link to download a portable copy of your medical information. If you have questions, please visit the Frequently Asked Questions section of the [employer] website. Remember, [employer] is NOT to be used for urgent needs. For medical emergencies, dial 911. Now available from your iPhone and Android! Please provide this summary of care documentation to your next provider. Your primary care clinician is listed as Mai Dai. If you have any questions after today's visit, please call [phone].

## 2018-06-29 LAB
ALBUMIN SERPL-MCNC: 4.8 G/DL (ref 3.5–5.5)
ALBUMIN/GLOB SERPL: 1.7 {RATIO} (ref 1.2–2.2)
ALP SERPL-CCNC: 55 IU/L (ref 39–117)
ALT SERPL-CCNC: 42 IU/L (ref 0–44)
AST SERPL-CCNC: 41 IU/L (ref 0–40)
BILIRUB SERPL-MCNC: 0.8 MG/DL (ref 0–1.2)
BUN SERPL-MCNC: 12 MG/DL (ref 6–24)
BUN/CREAT SERPL: 13 (ref 9–20)
CALCIUM SERPL-MCNC: 9.5 MG/DL (ref 8.7–10.2)
CHLORIDE SERPL-SCNC: 101 MMOL/L (ref 96–106)
CHOLEST SERPL-MCNC: 244 MG/DL (ref 100–199)
CO2 SERPL-SCNC: 20 MMOL/L (ref 20–29)
CREAT SERPL-MCNC: 0.92 MG/DL (ref 0.76–1.27)
ERYTHROCYTE [DISTWIDTH] IN BLOOD BY AUTOMATED COUNT: 13.7 % (ref 12.3–15.4)
EST. AVERAGE GLUCOSE BLD GHB EST-MCNC: 103 MG/DL
GLOBULIN SER CALC-MCNC: 2.8 G/DL (ref 1.5–4.5)
GLUCOSE SERPL-MCNC: 89 MG/DL (ref 65–99)
HBA1C MFR BLD: 5.2 % (ref 4.8–5.6)
HCT VFR BLD AUTO: 46.1 % (ref 37.5–51)
HDLC SERPL-MCNC: 55 MG/DL
HGB BLD-MCNC: 16.1 G/DL (ref 13–17.7)
INTERPRETATION, 910389: NORMAL
LDLC SERPL CALC-MCNC: 164 MG/DL (ref 0–99)
MCH RBC QN AUTO: 31.3 PG (ref 26.6–33)
MCHC RBC AUTO-ENTMCNC: 34.9 G/DL (ref 31.5–35.7)
MCV RBC AUTO: 90 FL (ref 79–97)
PLATELET # BLD AUTO: 229 X10E3/UL (ref 150–379)
POTASSIUM SERPL-SCNC: 4.5 MMOL/L (ref 3.5–5.2)
PROT SERPL-MCNC: 7.6 G/DL (ref 6–8.5)
RBC # BLD AUTO: 5.14 X10E6/UL (ref 4.14–5.8)
SODIUM SERPL-SCNC: 139 MMOL/L (ref 134–144)
TESTOST FREE SERPL-MCNC: 9.1 PG/ML (ref 6.8–21.5)
TESTOST SERPL-MCNC: 357 NG/DL (ref 264–916)
TRIGL SERPL-MCNC: 126 MG/DL (ref 0–149)
TSH SERPL DL<=0.005 MIU/L-ACNC: 1.82 UIU/ML (ref 0.45–4.5)
VLDLC SERPL CALC-MCNC: 25 MG/DL (ref 5–40)
WBC # BLD AUTO: 8.2 X10E3/UL (ref 3.4–10.8)

## 2018-07-03 NOTE — PROGRESS NOTES
Let him know his cholesterol has gone up. Although he has lost weight but his cholesterol has gone up. I know he is on a low carb diet but is he eating lots of meat? Rest of his labs came back fine.

## 2018-07-03 NOTE — PROGRESS NOTES
Spoke to patient's wife (verified on Hipaa) reviewed lab results. She states patient is eating a lot of meat with current Keto diet.

## 2019-10-07 ENCOUNTER — OFFICE VISIT (OUTPATIENT)
Dept: PRIMARY CARE CLINIC | Age: 44
End: 2019-10-07

## 2019-10-07 VITALS
TEMPERATURE: 98.5 F | DIASTOLIC BLOOD PRESSURE: 81 MMHG | HEART RATE: 82 BPM | BODY MASS INDEX: 35.92 KG/M2 | HEIGHT: 68 IN | SYSTOLIC BLOOD PRESSURE: 113 MMHG | RESPIRATION RATE: 18 BRPM | WEIGHT: 237 LBS | OXYGEN SATURATION: 95 %

## 2019-10-07 DIAGNOSIS — M77.8 LEFT ELBOW TENDONITIS: ICD-10-CM

## 2019-10-07 DIAGNOSIS — Z00.00 PHYSICAL EXAM: Primary | ICD-10-CM

## 2019-10-07 DIAGNOSIS — E66.9 OBESITY (BMI 30-39.9): ICD-10-CM

## 2019-10-07 RX ORDER — DICLOFENAC SODIUM 10 MG/G
GEL TOPICAL 4 TIMES DAILY
Qty: 100 G | Refills: 0 | Status: SHIPPED | OUTPATIENT
Start: 2019-10-07 | End: 2019-10-27 | Stop reason: SDUPTHER

## 2019-10-07 NOTE — PROGRESS NOTES
Jerod Love is a 53 y.o. male    Chief Complaint   Patient presents with    Complete Physical    Elbow Pain     Left elbow. Per patient fell backwards 6 month ago landed on his left elbow. he hadn't been experiencing the pain but experiencing it more recently    Weight Management     patient hasn't been able to see weight loss but has been trying to loss weight     1. Have you been to the ER, urgent care clinic since your last visit? Hospitalized since your last visit? No    2. Have you seen or consulted any other health care providers outside of the [address] [place] [address] since your last visit? Include any pap smears or colon screening.  No   Visit Vitals  /81 (BP 1 Location: Left arm, BP Patient Position: Sitting)   Pulse 82   Temp 98.5 °F (36.9 °C) (Oral)   Resp 18   Ht 5' 8\" (1.727 m)   Wt 237 lb (107.5 kg)   SpO2 95%   BMI 36.04 kg/m²

## 2019-10-07 NOTE — LETTER
10/7/2019 10:00 AM 
 
Mr. Steve Singletary 
[address] [facility] [phone] [facility] [address] To Whom It May Concern: 
 
Steve Singletary is currently under the care of Clarence Hendricks. Patient was seen today for a complete physical examination. If there are questions or concerns please have the patient contact our office. Sincerely, Erlin Carney MD

## 2019-10-07 NOTE — PROGRESS NOTES
Written by Rich Hartmann, as dictated by Dr. Anne Harkins MD.    Vinny Abdul is a 53 y.o. male. HPI  The patient comes in today for a complete physical examination. Patient is fasting for labs. He weighs 237 lbs today and weighed 244 lbs on 08/14/19. He would like some help with weight loss. He notes that he lost a lot of weight last year, and gained it back. Since then, the past few months, he has been dieting strictly. He has been cutting out carbohydrates and beer, and not eating processed sugar and candies most of the week. He has been eating vegetables, proteins, and protein packs. He has been feeling great. In terms of exercise, he averages around 13,000 steps daily, and works out about 45 minutes to 1 hr at the gym. However, he thought he would have thought he would have lost more weight with this diet plan and workout. He would like to follow an intermittent fasting diet if possible. His wife believes that the intermittent fasting might be something that is keeping him from losing weight. Denies heartburn, and constipation. He c/o L elbow pain. Initially, he hurt his elbow when he fell down some steps a few months ago. However, since the incident, for the past couple of weeks, his elbow has been feeling sore, and notes that some movements of ROM cause pain. Specifically, full extension on his elbow, and some flexion of his elbow. Hitting his elbow will also cause pain for a longer period of time. He describes the pain within his medial part of his elbow, which radiates up and down his arm. He does not feel like it is a muscle pain, but rather a bone pain. He has tried Motrin for the pain. He has increased his water intake to 64 oz of water daily. Denies  issues otherwise and denies F hx of prostate cancer. He has been told by his wife that he snores, but otherwise, he feels well rested in the mornings.     He recently went to his ophthalmologist, and his vision has not changed. Denies headaches. His L-sided sciatica has improved since working out, though it will flare up at times. He received his flu shot with his [facility] 2 weeks ago. He is a . Patient Active Problem List   Diagnosis Code    Obesity (BMI 30-39. 9) E66.9        Current Outpatient Medications on File Prior to Visit   Medication Sig Dispense Refill    cetirizine HCl (ALLER-TOBI PO) Take  by mouth.  omega 3-dha-epa-fish oil (FISH OIL) 100-160-1,000 mg cap Take  by mouth.  cholecalciferol (VITAMIN D3) 1,000 unit cap Take  by mouth daily.  [DISCONTINUED] ibuprofen (MOTRIN IB) 200 mg tablet Take  by mouth. No current facility-administered medications on file prior to visit.         Family History   Problem Relation Age of Onset    Hypertension Father        Social History     Socioeconomic History    Marital status:      Spouse name: Not on file    Number of children: Not on file    Years of education: Not on file    Highest education level: Not on file   Occupational History    Not on file   Social Needs    Financial resource strain: Not on file    Food insecurity:     Worry: Not on file     Inability: Not on file    Transportation needs:     Medical: Not on file     Non-medical: Not on file   Tobacco Use    Smoking status: Never Smoker    Smokeless tobacco: Never Used   Substance and Sexual Activity    Alcohol use: Yes     Comment: occationally    Drug use: No    Sexual activity: Yes     Partners: Female   Lifestyle    Physical activity:     Days per week: Not on file     Minutes per session: Not on file    Stress: Not on file   Relationships    Social connections:     Talks on phone: Not on file     Gets together: Not on file     Attends Restorationist service: Not on file     Active member of club or organization: Not on file     Attends meetings of clubs or organizations: Not on file     Relationship status: Not on file    Intimate partner violence:     Fear of current or ex partner: Not on file     Emotionally abused: Not on file     Physically abused: Not on file     Forced sexual activity: Not on file   Other Topics Concern    Not on file   Social History Narrative    Not on file       Review of Systems   Constitutional: Positive for weight loss (intentional). Negative for malaise/fatigue. HENT: Negative for congestion. Eyes: Negative for blurred vision. Respiratory: Negative for shortness of breath. Cardiovascular: Negative for chest pain and leg swelling. Gastrointestinal: Negative for constipation, diarrhea and heartburn. Genitourinary: Negative for dysuria, frequency and urgency. Musculoskeletal: Positive for back pain (improved) and joint pain (L medial elbow). Negative for myalgias. Neurological: Negative for dizziness and headaches. Psychiatric/Behavioral: Negative for depression and substance abuse. The patient is not nervous/anxious and does not have insomnia. Visit Vitals  /81 (BP 1 Location: Left arm, BP Patient Position: Sitting)   Pulse 82   Temp 98.5 °F (36.9 °C) (Oral)   Resp 18   Ht 5' 8\" (1.727 m)   Wt 237 lb (107.5 kg)   SpO2 95%   BMI 36.04 kg/m²       Physical Exam   Constitutional: He is oriented to person, place, and time. He appears well-developed. No distress. Obese   HENT:   Head: Normocephalic and atraumatic. Right Ear: Tympanic membrane, external ear and ear canal normal.   Left Ear: Tympanic membrane, external ear and ear canal normal.   Nose: Right sinus exhibits no maxillary sinus tenderness. Left sinus exhibits no maxillary sinus tenderness. Mouth/Throat: Oropharynx is clear and moist.   + Mallampati Score 3   Eyes: Pupils are equal, round, and reactive to light. Conjunctivae and EOM are normal. Right eye exhibits no discharge. Left eye exhibits no discharge. Neck: Normal range of motion. Neck supple. No thyromegaly present.    Cardiovascular: Normal rate, regular rhythm and normal heart sounds. Exam reveals no gallop and no friction rub. No murmur heard. Pulses:       Dorsalis pedis pulses are 2+ on the right side, and 2+ on the left side. Pulmonary/Chest: Effort normal and breath sounds normal. He has no wheezes. Abdominal: Soft. Bowel sounds are normal. There is no tenderness. Musculoskeletal: Normal range of motion. Left elbow: Tenderness (extension of L elbow) found. Medial epicondyle tenderness noted.   + BL knees without crepitus   Lymphadenopathy:     He has no cervical adenopathy. Neurological: He is alert and oriented to person, place, and time. He has normal reflexes. Reflex Scores:       Patellar reflexes are 2+ on the right side and 2+ on the left side. Skin: He is not diaphoretic. Psychiatric: He has a normal mood and affect. His behavior is normal. Thought content normal.   Nursing note and vitals reviewed. ASSESSMENT and PLAN    ICD-10-CM ICD-9-CM    1. Physical exam O30.50 N27.9 METABOLIC PANEL, COMPREHENSIVE      CBC W/O DIFF      LIPID PANEL      TSH 3RD GENERATION    Complete physical exam done. Basic labs drawn. 2. Left elbow tendonitis M77.8 727.09 diclofenac (VOLTAREN) 1 % gel sent to pharmacy. XR ELBOW LT MIN 3 V    XR Elbow LT ordered. If XR is positive will refer to Orthopedic Surgery. Diclofenac 1 % gel prescribed. Potential side effects were discussed. Pt can used up to QID daily for pain. If no improvement with his pain, will refer to Orthopedics for cortisone injections. 3. Obesity (BMI 30-39. 9) E66.9 278.00 Commended on weight loss. Encouraged pt that his diet and workout plan is working well, and it might be hard to lose weight rapidly as a person ages. Advised pt that based on his weight, height, gender, age and general exercise, he should be eating 1800 calories daily. This plan was reviewed with the patient and patient agrees. All questions were answered.     This scribe documentation was reviewed by me and accurately reflects the examination and decisions made by me. This note will not be viewable in [address]

## 2019-10-08 LAB
ALBUMIN SERPL-MCNC: 4.8 G/DL (ref 3.5–5.5)
ALBUMIN/GLOB SERPL: 2.1 {RATIO} (ref 1.2–2.2)
ALP SERPL-CCNC: 43 IU/L (ref 39–117)
ALT SERPL-CCNC: 64 IU/L (ref 0–44)
AST SERPL-CCNC: 50 IU/L (ref 0–40)
BILIRUB SERPL-MCNC: 0.5 MG/DL (ref 0–1.2)
BUN SERPL-MCNC: 21 MG/DL (ref 6–24)
BUN/CREAT SERPL: 27 (ref 9–20)
CALCIUM SERPL-MCNC: 9.2 MG/DL (ref 8.7–10.2)
CHLORIDE SERPL-SCNC: 105 MMOL/L (ref 96–106)
CHOLEST SERPL-MCNC: 286 MG/DL (ref 100–199)
CO2 SERPL-SCNC: 19 MMOL/L (ref 20–29)
COMMENT, 011824: ABNORMAL
CREAT SERPL-MCNC: 0.77 MG/DL (ref 0.76–1.27)
ERYTHROCYTE [DISTWIDTH] IN BLOOD BY AUTOMATED COUNT: 12.5 % (ref 12.3–15.4)
GLOBULIN SER CALC-MCNC: 2.3 G/DL (ref 1.5–4.5)
GLUCOSE SERPL-MCNC: 108 MG/DL (ref 65–99)
HCT VFR BLD AUTO: 47 % (ref 37.5–51)
HDLC SERPL-MCNC: 54 MG/DL
HGB BLD-MCNC: 16.2 G/DL (ref 13–17.7)
LDLC SERPL CALC-MCNC: 209 MG/DL (ref 0–99)
MCH RBC QN AUTO: 31.8 PG (ref 26.6–33)
MCHC RBC AUTO-ENTMCNC: 34.5 G/DL (ref 31.5–35.7)
MCV RBC AUTO: 92 FL (ref 79–97)
PLATELET # BLD AUTO: 246 X10E3/UL (ref 150–450)
POTASSIUM SERPL-SCNC: 4.4 MMOL/L (ref 3.5–5.2)
PROT SERPL-MCNC: 7.1 G/DL (ref 6–8.5)
RBC # BLD AUTO: 5.1 X10E6/UL (ref 4.14–5.8)
SODIUM SERPL-SCNC: 140 MMOL/L (ref 134–144)
TRIGL SERPL-MCNC: 117 MG/DL (ref 0–149)
TSH SERPL DL<=0.005 MIU/L-ACNC: 1.95 UIU/ML (ref 0.45–4.5)
VLDLC SERPL CALC-MCNC: 23 MG/DL (ref 5–40)
WBC # BLD AUTO: 6.3 X10E3/UL (ref 3.4–10.8)

## 2019-10-09 NOTE — PROGRESS NOTES
Called and spoke with Castro Jauregui at [facility] who indicates A1C was successfully added to existing lab specimens. If there are issues, they will notify the office. End of encounter.

## 2019-10-11 LAB
HBA1C MFR BLD: 5.1 % (ref 4.8–5.6)
SPECIMEN STATUS REPORT, ROLRST: NORMAL

## 2019-10-14 NOTE — PROGRESS NOTES
Des Garcia, your cholesterol came back high. I think we need to talk about your high protein diet. I left you a voicemail as well . Your liver enzymes are going up. I need to discuss diet for that as well.

## 2019-10-27 DIAGNOSIS — M77.8 LEFT ELBOW TENDONITIS: ICD-10-CM

## 2019-10-27 RX ORDER — DICLOFENAC SODIUM 10 MG/G
2 GEL TOPICAL 4 TIMES DAILY
Qty: 100 G | Refills: 0 | Status: SHIPPED | OUTPATIENT
Start: 2019-10-27 | End: 2019-11-26

## 2019-12-16 DIAGNOSIS — R74.8 ELEVATED LIVER ENZYMES: ICD-10-CM

## 2019-12-16 DIAGNOSIS — E66.9 OBESITY (BMI 30-39.9): Primary | ICD-10-CM

## 2019-12-17 LAB
ALBUMIN SERPL-MCNC: 4.5 G/DL (ref 3.5–5.5)
ALBUMIN/GLOB SERPL: 2.1 {RATIO} (ref 1.2–2.2)
ALP SERPL-CCNC: 45 IU/L (ref 39–117)
ALT SERPL-CCNC: 39 IU/L (ref 0–44)
AST SERPL-CCNC: 33 IU/L (ref 0–40)
BILIRUB SERPL-MCNC: 0.6 MG/DL (ref 0–1.2)
BUN SERPL-MCNC: 13 MG/DL (ref 6–24)
BUN/CREAT SERPL: 13 (ref 9–20)
CALCIUM SERPL-MCNC: 9.3 MG/DL (ref 8.7–10.2)
CHLORIDE SERPL-SCNC: 103 MMOL/L (ref 96–106)
CHOLEST SERPL-MCNC: 202 MG/DL (ref 100–199)
CO2 SERPL-SCNC: 22 MMOL/L (ref 20–29)
CREAT SERPL-MCNC: 1 MG/DL (ref 0.76–1.27)
GLOBULIN SER CALC-MCNC: 2.1 G/DL (ref 1.5–4.5)
GLUCOSE SERPL-MCNC: 100 MG/DL (ref 65–99)
HDLC SERPL-MCNC: 40 MG/DL
LDLC SERPL CALC-MCNC: 140 MG/DL (ref 0–99)
POTASSIUM SERPL-SCNC: 4.3 MMOL/L (ref 3.5–5.2)
PROT SERPL-MCNC: 6.6 G/DL (ref 6–8.5)
SODIUM SERPL-SCNC: 138 MMOL/L (ref 134–144)
TRIGL SERPL-MCNC: 110 MG/DL (ref 0–149)
VLDLC SERPL CALC-MCNC: 22 MG/DL (ref 5–40)

## 2020-03-19 ENCOUNTER — HOSPITAL ENCOUNTER (EMERGENCY)
Age: 45
Discharge: HOME | End: 2020-03-19
Attending: EMERGENCY MEDICINE
Payer: COMMERCIAL

## 2020-03-19 ENCOUNTER — TELEPHONE (OUTPATIENT)
Dept: PRIMARY CARE CLINIC | Age: 45
End: 2020-03-19

## 2020-03-19 VITALS
DIASTOLIC BLOOD PRESSURE: 78 MMHG | BODY MASS INDEX: 40.48 KG/M2 | OXYGEN SATURATION: 97 % | SYSTOLIC BLOOD PRESSURE: 126 MMHG | WEIGHT: 257.94 LBS | HEIGHT: 67 IN | TEMPERATURE: 98 F | HEART RATE: 91 BPM | RESPIRATION RATE: 16 BRPM

## 2020-03-19 DIAGNOSIS — R09.81 NASAL CONGESTION: Primary | ICD-10-CM

## 2020-03-19 DIAGNOSIS — R05.9 COUGH: ICD-10-CM

## 2020-03-19 LAB
FLUAV AG NPH QL IA: NEGATIVE
FLUBV AG NOSE QL IA: NEGATIVE

## 2020-03-19 PROCEDURE — 99284 EMERGENCY DEPT VISIT MOD MDM: CPT

## 2020-03-19 PROCEDURE — 87804 INFLUENZA ASSAY W/OPTIC: CPT

## 2020-03-19 NOTE — ED PROVIDER NOTES
HPI       53y M here with sinus pressure, congestion, sneezing, and cough. Sx's started about 10-12 days ago. Seen at urgent care. Watchung it was allergy. Told to use flonase and mucinex. Didn't really stick to the flonase but used mucinex. Told to do this for a week and if not better, he was given rx for augmentin and told to start. Started the augmentin 3 days ago. Still with congestion. He denies shortness of breath. Has a slight cough. Has not had fever during any of this illness. Called his PMD who told him to come to the ED today to \"get tested. \" works up in the [facility] area - has travelled back and forth (by car) over the past few weeks but no known sick contacts. History reviewed. No pertinent past medical history. History reviewed. No pertinent surgical history.       Family History:   Problem Relation Age of Onset    Hypertension Father        Social History     Socioeconomic History    Marital status:      Spouse name: Not on file    Number of children: Not on file    Years of education: Not on file    Highest education level: Not on file   Occupational History    Not on file   Social Needs    Financial resource strain: Not on file    Food insecurity     Worry: Not on file     Inability: Not on file    Transportation needs     Medical: Not on file     Non-medical: Not on file   Tobacco Use    Smoking status: Never Smoker    Smokeless tobacco: Never Used   Substance and Sexual Activity    Alcohol use: Yes     Comment: socially     Drug use: No    Sexual activity: Yes     Partners: Female   Lifestyle    Physical activity     Days per week: Not on file     Minutes per session: Not on file    Stress: Not on file   Relationships    Social connections     Talks on phone: Not on file     Gets together: Not on file     Attends Pentecostalism service: Not on file     Active member of club or organization: Not on file     Attends meetings of clubs or organizations: Not on file Relationship status: Not on file    Intimate partner violence     Fear of current or ex partner: Not on file     Emotionally abused: Not on file     Physically abused: Not on file     Forced sexual activity: Not on file   Other Topics Concern    Not on file   Social History Narrative    Not on file         ALLERGIES: Patient has no known allergies. Review of Systems   Review of Systems   Constitutional: (-) weight loss. HEENT: (-) stiff neck   Eyes: (-) discharge. Respiratory: (+) cough. Cardiovascular: (-) syncope. Gastrointestinal: (-) blood in stool. Genitourinary: (-) hematuria. Musculoskeletal: (-) myalgias. Neurological: (-) seizure. Skin: (-) petechiae  Lymph/Immunologic: (-) enlarged lymph nodes  All other systems reviewed and are negative. Vitals:    03/19/20 1632   BP: (!) 149/95   Pulse: 91   Resp: 16   Temp: 98 °F (36.7 °C)   SpO2: 98%   Weight: 117 kg (257 lb 15 oz)   Height: 5' 7\" (1.702 m)            Physical Exam Nursing note and vitals reviewed. Constitutional: oriented to person, place, and time. appears well-developed and well-nourished. No distress. Head: Normocephalic and atraumatic. Sclera anicteric  Nose: No rhinorrhea  Mouth/Throat: Oropharynx is clear and moist. Pharynx normal  Eyes: Conjunctivae are normal. Pupils are equal, round, and reactive to light. Right eye exhibits no discharge. Left eye exhibits no discharge. Neck: Painless normal range of motion. Neck supple. No LAD. Cardiovascular: Normal rate, regular rhythm, normal heart sounds and intact distal pulses. Exam reveals no gallop and no friction rub. No murmur heard. Pulmonary/Chest:  No respiratory distress. No wheezes. No rales. No rhonchi. No increased work of breathing. No accessory muscle use. Good air exchange throughout. Abdominal: soft, non-tender, no rebound or guarding. No hepatosplenomegaly. Normal bowel sounds throughout.   Back: no tenderness to palpation, no deformities, no CVA tenderness  Extremities/Musculoskeletal: Normal range of motion. no tenderness. No edema. Distal extremities are neurovasc intact. Lymphadenopathy:   No adenopathy. Neurological:  Alert and oriented to person, place, and time. Coordination normal. CN 2-12 intact. Motor and sensory function intact. Skin: Skin is warm and dry. No rash noted. No pallor. MDM 53y M here with cough, congestion, sneezing. Sounds more allergic. Will check for flu, but no fever during any of this illness. Per [facility], no covid testing for patients being discharged. Advised to quarantine and monitor symptoms.           Procedures

## 2020-03-19 NOTE — TELEPHONE ENCOUNTER
Called and spoke with patient's wife and she sates  is visually and audibly short of breath. States that [address] [facility] instructed them to call their PCP. With s/s and respiratory distress, advised to take patient to ED immediately. Advises they are en route. End of encounter.

## 2020-03-19 NOTE — TELEPHONE ENCOUNTER
Patient is in [place] and has had a cold for a while, it has not gone away. He has had difficulty breathing, on going cold, cough, congestion, sore throat. Wants to know if she should have him come home and quarantine him here or tell him to stay where he is, he is staying in an air b&b right now there and doesn't know what to do.

## 2020-03-19 NOTE — TELEPHONE ENCOUNTER
Was told to call us, patient is gasping for breathe just walking up the steps.  Wants to know if he should go to the er

## 2020-03-19 NOTE — ED TRIAGE NOTES
Pt arrives ambulatory to ED with complaints of nasal congestion, sinus pressure, cough and sneezing x 10 days. Pt was seen at urgent care and dx with sinus infection. Denies fever. Pt states on Tuesday he woke up with a sore throat. Pt currently being treated with antibiotic (possibly augmentin) since Monday.  Pt wife called PCP and PCP advised pt to come to ER

## 2020-03-19 NOTE — DISCHARGE INSTRUCTIONS
Patient Education     Preventing the Spread of Coronavirus Disease 2019 in Homes and Residential Communities   For the most recent information go to [email or web address].fi    Prevention steps for People with confirmed or suspected COVID-19 (including persons under investigation) who do not need to be hospitalized  and   People with confirmed COVID-19 who were hospitalized and determined to be medically stable to go home    Your healthcare provider and public health staff will evaluate whether you can be cared for at home. If it is determined that you do not need to be hospitalized and can be isolated at home, you will be monitored by staff from your local or state health department. You should follow the prevention steps below until a healthcare provider or local or state health department says you can return to your normal activities. Stay home except to get medical care  People who are mildly ill with COVID-19 are able to isolate at home during their illness. You should restrict activities outside your home, except for getting medical care. Do not go to work, school, or public areas. Avoid using public transportation, ride-sharing, or taxis. Separate yourself from other people and animals in your home  People: As much as possible, you should stay in a specific room and away from other people in your home. Also, you should use a separate bathroom, if available. Animals: You should restrict contact with pets and other animals while you are sick with COVID-19, just like you would around other people. Although there have not been reports of pets or other animals becoming sick with COVID-19, it is still recommended that people sick with COVID-19 limit contact with animals until more information is known about the virus. When possible, have another member of your household care for your animals while you are sick.  If you are sick with COVID-19, avoid contact with your pet, including petting, snuggling, being kissed or licked, and sharing food. If you must care for your pet or be around animals while you are sick, wash your hands before and after you interact with pets and wear a facemask. Call ahead before visiting your doctor  If you have a medical appointment, call the healthcare provider and tell them that you have or may have COVID-19. This will help the healthcare providers office take steps to keep other people from getting infected or exposed. Wear a facemask  You should wear a facemask when you are around other people (e.g., sharing a room or vehicle) or pets and before you enter a healthcare providers office. If you are not able to wear a facemask (for example, because it causes trouble breathing), then people who live with you should not stay in the same room with you, or they should wear a facemask if they enter your room. Cover your coughs and sneezes  Cover your mouth and nose with a tissue when you cough or sneeze. Throw used tissues in a lined trash can. Immediately wash your hands with soap and water for at least 20 seconds or, if soap and water are not available, clean your hands with an alcohol-based hand  that contains at least 60% alcohol. Clean your hands often  Wash your hands often with soap and water for at least 20 seconds, especially after blowing your nose, coughing, or sneezing; going to the bathroom; and before eating or preparing food. If soap and water are not readily available, use an alcohol-based hand  with at least 60% alcohol, covering all surfaces of your hands and rubbing them together until they feel dry. Soap and water are the best option if hands are visibly dirty. Avoid touching your eyes, nose, and mouth with unwashed hands. Avoid sharing personal household items  You should not share dishes, drinking glasses, cups, eating utensils, towels, or bedding with other people or pets in your home.  After using these items, they should be washed thoroughly with soap and water. Clean all high-touch surfaces everyday  High touch surfaces include counters, tabletops, doorknobs, bathroom fixtures, toilets, phones, keyboards, tablets, and bedside tables. Also, clean any surfaces that may have blood, stool, or body fluids on them. Use a household cleaning spray or wipe, according to the label instructions. Labels contain instructions for safe and effective use of the cleaning product including precautions you should take when applying the product, such as wearing gloves and making sure you have good ventilation during use of the product. Monitor your symptoms  Seek prompt medical attention if your illness is worsening (e.g., difficulty breathing). Before seeking care, call your healthcare provider and tell them that you have, or are being evaluated for, COVID-19. Put on a facemask before you enter the facility. These steps will help the healthcare providers office to keep other people in the office or waiting room from getting infected or exposed. Ask your healthcare provider to call the local or state health department. Persons who are placed under active monitoring or facilitated self-monitoring should follow instructions provided by their local health department or occupational health professionals, as appropriate. When working with your local health department check their available hours. If you have a medical emergency and need to call 911, notify the dispatch personnel that you have, or are being evaluated for COVID-19. If possible, put on a facemask before emergency medical services arrive. Discontinuing home isolation  Patients with confirmed COVID-19 should remain under home isolation precautions until the risk of secondary transmission to others is thought to be low.  The decision to discontinue home isolation precautions should be made on a case-by-case basis, in consultation with healthcare providers and state and local health departments. Cough: Care Instructions  Your Care Instructions    A cough is your body's response to something that bothers your throat or airways. Many things can cause a cough. You might cough because of a cold or the flu, bronchitis, or asthma. Smoking, postnasal drip, allergies, and stomach acid that backs up into your throat also can cause coughs. A cough is a symptom, not a disease. Most coughs stop when the cause, such as a cold, goes away. You can take a few steps at home to cough less and feel better. Follow-up care is a key part of your treatment and safety. Be sure to make and go to all appointments, and call your doctor if you are having problems. It's also a good idea to know your test results and keep a list of the medicines you take. How can you care for yourself at home? · Drink lots of water and other fluids. This helps thin the mucus and soothes a dry or sore throat. Honey or lemon juice in hot water or tea may ease a dry cough. · Take cough medicine as directed by your doctor. · Prop up your head on pillows to help you breathe and ease a dry cough. · Try cough drops to soothe a dry or sore throat. Cough drops don't stop a cough. Medicine-flavored cough drops are no better than candy-flavored drops or hard candy. · Do not smoke. Avoid secondhand smoke. If you need help quitting, talk to your doctor about stop-smoking programs and medicines. These can increase your chances of quitting for good. When should you call for help? Call 911 anytime you think you may need emergency care.  For example, call if:    · You have severe trouble breathing.    Call your doctor now or seek immediate medical care if:    · You cough up blood.     · You have new or worse trouble breathing.     · You have a new or higher fever.     · You have a new rash.    Watch closely for changes in your health, and be sure to contact your doctor if:    · You cough more deeply or more often, especially if you notice more mucus or a change in the color of your mucus.     · You have new symptoms, such as a sore throat, an earache, or sinus pain.     · You do not get better as expected. Where can you learn more? Go to http://ronaldo-jackie.info/  Enter D279 in the search box to learn more about \"Cough: Care Instructions. \"  Current as of: June 9, 2019Content Version: 12.4  © 4478-1338 [employer]. Care instructions adapted under license by [employer] (which disclaims liability or warranty for this information). If you have questions about a medical condition or this instruction, always ask your healthcare professional. [address] any warranty or liability for your use of this information.

## 2020-03-19 NOTE — TELEPHONE ENCOUNTER
Called and spoke with patient's wife and she states that\" he is home now. I told him to drive home there is nothing open up there. No services, etc.\" Advises that patient is on quarantine, given COVID-19 Help Line # strongly encouraged to call d/t patient s/s and circumstances, she states they will do so. Encouraged rest, OTC's to treat s/s, stay hydrated, etc. Encouraged to call as needed. End of encounter.

## 2020-05-06 ENCOUNTER — TELEPHONE (OUTPATIENT)
Dept: PRIMARY CARE CLINIC | Age: 45
End: 2020-05-06

## 2020-05-06 NOTE — TELEPHONE ENCOUNTER
Returned call to patient/wife  Verified demograpics  Scheduled telemed visit with Dr Evan Moran for 5/7/20 @ [phone]

## 2020-05-06 NOTE — TELEPHONE ENCOUNTER
Patients wife would like to speak to a nurse about next steps on what to do. Patient is having numbness and pain in legs and was told he possibly should see a neurologist. Would like to speak to someone about if they should still see someone.

## 2020-05-07 ENCOUNTER — VIRTUAL VISIT (OUTPATIENT)
Dept: PRIMARY CARE CLINIC | Age: 45
End: 2020-05-07

## 2020-05-07 DIAGNOSIS — R20.0 NUMBNESS OF LEFT FOOT: Primary | ICD-10-CM

## 2020-05-07 DIAGNOSIS — G89.29 CHRONIC LEFT-SIDED LOW BACK PAIN WITH LEFT-SIDED SCIATICA: ICD-10-CM

## 2020-05-07 DIAGNOSIS — E66.9 OBESITY (BMI 30-39.9): ICD-10-CM

## 2020-05-07 DIAGNOSIS — M54.42 CHRONIC LEFT-SIDED LOW BACK PAIN WITH LEFT-SIDED SCIATICA: ICD-10-CM

## 2020-05-07 RX ORDER — CYCLOBENZAPRINE HCL 10 MG
10 TABLET ORAL
Qty: 10 TAB | Refills: 0 | Status: SHIPPED | OUTPATIENT
Start: 2020-05-07 | End: 2020-05-17

## 2020-05-07 RX ORDER — PREDNISONE 20 MG/1
TABLET ORAL
Qty: 12 TAB | Refills: 0 | Status: SHIPPED | OUTPATIENT
Start: 2020-05-07 | End: 2020-10-09

## 2020-05-07 NOTE — PROGRESS NOTES
Written by Esvin Myers, as dictated by Dr. Jone Neely MD.    Yael Villalobos is a 53 y.o. male. HPI  I was in the office while conducting this encounter. Consent:  He and/or his healthcare decision maker is aware that this patient-initiated Telehealth encounter is a billable service, with coverage as determined by his insurance carrier. He is aware that he may receive a bill and has provided verbal consent to proceed: Yes    This virtual visit was conducted via [address] Pursuant to the emergency declaration under the [address] waiver authority and the [employer] Act, this Virtual  Visit was conducted to reduce the patient's risk of exposure to COVID-19 and provide continuity of care for an established patient. Services were provided through a video synchronous discussion virtually to substitute for in-person clinic visit. Due to this being a TeleHealth evaluation, many elements of the physical examination are unable to be assessed. The patient presents today c/o constant numbness in the L torso and L leg x 2 weeks. He has a hx of sciatica. He reports about a month ago he started to get pain in his lower back that radiated to his foot. He has been experiencing numbness in his foot for the past few weeks. He started to get pain in his legs with mild exertion. His pain started to get worse when he was exercising. He started to stretch more and noticed the pain was at the base of his spine. He went to [facility] where he had XRs done but the did not show anything. He was given Naproxen and Flexeril for his pain while at [facility]. He has not done any unusual heavy lifting, he did not have a fall, he has not been doing yard work. He occasionally gets heart burn. Patient Active Problem List   Diagnosis Code    Obesity (BMI 30-39. 9) E66.9        No current outpatient medications on file prior to visit. No current facility-administered medications on file prior to visit. No Known Allergies    No past medical history on file. No past surgical history on file.     Family History   Problem Relation Age of Onset    Hypertension Father        Social History     Socioeconomic History    Marital status:      Spouse name: Not on file    Number of children: Not on file    Years of education: Not on file    Highest education level: Not on file   Occupational History    Not on file   Social Needs    Financial resource strain: Not on file    Food insecurity     Worry: Not on file     Inability: Not on file    Transportation needs     Medical: Not on file     Non-medical: Not on file   Tobacco Use    Smoking status: Never Smoker    Smokeless tobacco: Never Used   Substance and Sexual Activity    Alcohol use: Yes     Comment: socially     Drug use: No    Sexual activity: Yes     Partners: Female   Lifestyle    Physical activity     Days per week: Not on file     Minutes per session: Not on file    Stress: Not on file   Relationships    Social connections     Talks on phone: Not on file     Gets together: Not on file     Attends Mandae[employer] service: Not on file     Active member of club or organization: Not on file     Attends meetings of clubs or organizations: Not on file     Relationship status: Not on file    Intimate partner violence     Fear of current or ex partner: Not on file     Emotionally abused: Not on file     Physically abused: Not on file     Forced sexual activity: Not on file   Other Topics Concern    Not on file   Social History Narrative    Not on file       Admission on 03/19/2020, Discharged on 03/19/2020   Component Date Value Ref Range Status    Influenza A Antigen 03/19/2020 NEGATIVE   NEG   Final    Influenza B Antigen 03/19/2020 NEGATIVE   NEG   Final       Review of Systems   Constitutional: Negative for malaise/fatigue. HENT: Negative for congestion. Eyes: Negative for blurred vision. Respiratory: Negative for shortness of breath. Cardiovascular: Negative for chest pain and leg swelling. Gastrointestinal: Positive for heartburn. Negative for constipation and diarrhea. Genitourinary: Negative for dysuria. Musculoskeletal: Positive for back pain (lower back pain). Negative for joint pain and myalgias. Numbness in L leg and foot    Neurological: Negative for dizziness. Psychiatric/Behavioral: Negative for depression and substance abuse. The patient is not nervous/anxious and does not have insomnia. There were no vitals taken for this visit. Physical Exam  Constitutional:       General: He is not in acute distress. Appearance: He is not diaphoretic. HENT:      Head: Normocephalic and atraumatic. Eyes:      General:         Right eye: No discharge. Left eye: No discharge. Conjunctiva/sclera: Conjunctivae normal.   Pulmonary:      Effort: Pulmonary effort is normal. No respiratory distress. Neurological:      Mental Status: He is alert and oriented to person, place, and time. Psychiatric:         Behavior: Behavior normal.         Thought Content: Thought content normal.         ASSESSMENT and PLAN    ICD-10-CM ICD-9-CM    1. Numbness of left foot R20.8 782.0 predniSONE (DELTASONE) 20 mg tablet sent to pharmacy     Prednisone 20 mg prescribed Potential side effects were discussed. Pt should take 60 mg x 2 days, 40 mg x 2 days, 20 mg x 1 day, 10 mg x 1 day    Explained to pt he may have lumbar stenosis and if his numbness does not resolve he will need an MRI done       Pt should not take Naproxen while on this medication   2.  Chronic left-sided low back pain with left-sided sciatica M54.42 724.2 cyclobenzaprine (FLEXERIL) 10 mg tablet sent to pharmacy     Flexeril 10 mg refilled     Explained to pt he will need MRI done of his pain and numbness does not subside Advised pt not to drink alcohol on this medication         G89.29 724.3 predniSONE (DELTASONE) 20 mg tablet sent to pharmacy     Prednisone 20 mg prescribed Potential side effects were discussed. Pt should take 60 mg x 2 days, 40 mg x 2 days, 20 mg x 1 day, 10 mg x 1 day    Advised pt to take this medication with food      338.29      Total Time: minutes: 11-20 minutes. This plan was reviewed with the patient and patient agrees. All questions were answered. This scribe documentation was reviewed by me and accurately reflects the examination and decisions made by me. This note will not be viewable in [address]

## 2020-05-22 ENCOUNTER — VIRTUAL VISIT (OUTPATIENT)
Dept: PRIMARY CARE CLINIC | Age: 45
End: 2020-05-22

## 2020-05-22 DIAGNOSIS — G89.29 CHRONIC LEFT-SIDED LOW BACK PAIN WITH LEFT-SIDED SCIATICA: ICD-10-CM

## 2020-05-22 DIAGNOSIS — M54.42 CHRONIC LEFT-SIDED LOW BACK PAIN WITH LEFT-SIDED SCIATICA: ICD-10-CM

## 2020-05-22 DIAGNOSIS — R20.0 LEFT LEG NUMBNESS: Primary | ICD-10-CM

## 2020-05-22 DIAGNOSIS — E66.9 OBESITY (BMI 30-39.9): ICD-10-CM

## 2020-05-22 RX ORDER — LIDOCAINE 50 MG/G
PATCH TOPICAL
Qty: 5 PATCH | Refills: 1 | Status: SHIPPED | OUTPATIENT
Start: 2020-05-22 | End: 2020-10-09

## 2020-05-22 RX ORDER — CYCLOBENZAPRINE HCL 10 MG
10 TABLET ORAL
Qty: 15 TAB | Refills: 0 | Status: SHIPPED | OUTPATIENT
Start: 2020-05-22 | End: 2020-06-12 | Stop reason: SDUPTHER

## 2020-05-22 NOTE — PROGRESS NOTES
Written by Stanley Berkowitz, as dictated by Dr. Moris Grewal MD.    Lucia Siddiqi is a 53 y.o. male. HPI  I was in the office while conducting this encounter. Consent:  He and/or his healthcare decision maker is aware that this patient-initiated Telehealth encounter is a billable service, with coverage as determined by his insurance carrier. He is aware that he may receive a bill and has provided verbal consent to proceed: Yes    This virtual visit was conducted via [address] Pursuant to the emergency declaration under the [address] [facility]5 waiver authority and the [employer] Act, this Virtual  Visit was conducted to reduce the patient's risk of exposure to COVID-19 and provide continuity of care for an established patient. Services were provided through a video synchronous discussion virtually to substitute for in-person clinic visit. Due to this being a TeleHealth evaluation, many elements of the physical examination are unable to be assessed. The patient presents today for a follow-up. He still is experiencing numbness around his waist. He can not walk around with his leg getting numb. He does not want to take muscle relaxer's for the rest of his life. He took Prednisone 20 mg which did help him after the 2nd and 3rd day but his symptoms did come back after a few days. He has not been taking Naproxen since he finished Prednisone. He has been taking Flexeril 10 mg every other night for his pain. He was told he can not get an XR done at a [address] [place] [address] ED because they do not take his insurance. He does not want to get an MRI done if he has to pay out of pocket. Charles Brody He is wondering if he should find a new PCP if his insurance does not cover his medical expenses. Patient Active Problem List   Diagnosis Code    Obesity (BMI 30-39. 9) E66.9        Current Outpatient Medications on File Prior to Visit   Medication Sig Dispense Refill    predniSONE (DELTASONE) 20 mg tablet Prednisone 60 mg po x 2 days,40 mg po x 2 days,20 mg po x 1 day,10 mg po x 1 day then stop. 12 Tab 0     No current facility-administered medications on file prior to visit. No Known Allergies    No past medical history on file. No past surgical history on file.     Family History   Problem Relation Age of Onset    Hypertension Father        Social History     Socioeconomic History    Marital status:      Spouse name: Not on file    Number of children: Not on file    Years of education: Not on file    Highest education level: Not on file   Occupational History    Not on file   Social Needs    Financial resource strain: Not on file    Food insecurity     Worry: Not on file     Inability: Not on file    Transportation needs     Medical: Not on file     Non-medical: Not on file   Tobacco Use    Smoking status: Never Smoker    Smokeless tobacco: Never Used   Substance and Sexual Activity    Alcohol use: Yes     Comment: socially     Drug use: No    Sexual activity: Yes     Partners: Female   Lifestyle    Physical activity     Days per week: Not on file     Minutes per session: Not on file    Stress: Not on file   Relationships    Social connections     Talks on phone: Not on file     Gets together: Not on file     Attends [employer] service: Not on file     Active member of club or organization: Not on file     Attends meetings of clubs or organizations: Not on file     Relationship status: Not on file    Intimate partner violence     Fear of current or ex partner: Not on file     Emotionally abused: Not on file     Physically abused: Not on file     Forced sexual activity: Not on file   Other Topics Concern    Not on file   Social History Narrative    Not on file       Admission on 03/19/2020, Discharged on 03/19/2020   Component Date Value Ref Range Status    Influenza A Antigen 03/19/2020 NEGATIVE   NEG   Final    Influenza B Antigen 03/19/2020 NEGATIVE   NEG   Final     Review of Systems   Constitutional: Negative for malaise/fatigue. HENT: Negative for congestion. Eyes: Negative for blurred vision. Respiratory: Negative for shortness of breath. Cardiovascular: Negative for chest pain and leg swelling. Gastrointestinal: Negative for constipation, diarrhea and heartburn. Genitourinary: Negative for dysuria, frequency and urgency. Musculoskeletal: Positive for back pain (lower back ). Negative for joint pain and myalgias. L leg numbness     Neurological: Negative for dizziness and headaches. Psychiatric/Behavioral: Negative for depression and substance abuse. The patient is not nervous/anxious and does not have insomnia. There were no vitals taken for this visit. Physical Exam  Constitutional:       General: He is not in acute distress. Appearance: He is not diaphoretic. HENT:      Head: Normocephalic and atraumatic. Eyes:      General:         Right eye: No discharge. Left eye: No discharge. Conjunctiva/sclera: Conjunctivae normal.   Pulmonary:      Effort: Pulmonary effort is normal. No respiratory distress. Neurological:      Mental Status: He is alert and oriented to person, place, and time. Psychiatric:         Behavior: Behavior normal.         Thought Content: Thought content normal.         ASSESSMENT and PLAN    ICD-10-CM ICD-9-CM    1. Left leg numbness R20.0 782.0 MRI LUMB SPINE W WO CONT    Lumbar spine MRI ordered      lidocaine (LIDODERM) 5 % sent to pharmacy    Lidocaine 5% prescribed. Potential side effects were discussed. Pt should apply patch to affected area for 12 hours and remove for 12 hours     Pt did finish Prednisone course but his symptoms did come back after a few days       Explained that we can not view the CD he dropped off due to policy.  The only way the radiologist will look at this CD is if he compares it to MRI done in office. In person PT is difficult at this time so he can get approved for   an MRI     Explained that if MRI does not get approved I will have to peer to peer review with the insurance companies medical director and explain why he needs an MRI  due to       cyclobenzaprine (FLEXERIL) 10 mg tablet sent to pharmacy     Flexeril 10 mg refilled    2. Chronic left-sided low back pain with left-sided sciatica M54.42 724.2 MRI LUMB SPINE W WO CONT    Lumbar spine MRI ordered    G89.29 724.3 lidocaine (LIDODERM) 5 %    Lidocaine 5% prescribed. Potential side effects were discussed. Pt should apply patch to affected area for 12 hours and remove for 12 hours      338.29 cyclobenzaprine (FLEXERIL) 10 mg tablet sent to pharmacy    Flexeril 10 mg refilled     Total Time: minutes: 11-20 minutes. This plan was reviewed with the patient and patient agrees. All questions were answered. This scribe documentation was reviewed by me and accurately reflects the examination and decisions made by me. This note will not be viewable in [address]

## 2020-06-01 ENCOUNTER — HOSPITAL ENCOUNTER (OUTPATIENT)
Dept: MRI IMAGING | Age: 45
Discharge: HOME | End: 2020-06-01
Attending: INTERNAL MEDICINE
Payer: COMMERCIAL

## 2020-06-01 VITALS — BODY MASS INDEX: 37.59 KG/M2 | WEIGHT: 240 LBS

## 2020-06-01 DIAGNOSIS — M48.062 NEUROGENIC CLAUDICATION DUE TO LUMBAR SPINAL STENOSIS: Primary | ICD-10-CM

## 2020-06-01 DIAGNOSIS — G89.29 CHRONIC LEFT-SIDED LOW BACK PAIN WITH LEFT-SIDED SCIATICA: ICD-10-CM

## 2020-06-01 DIAGNOSIS — R20.0 LEFT LEG NUMBNESS: ICD-10-CM

## 2020-06-01 DIAGNOSIS — M54.42 CHRONIC LEFT-SIDED LOW BACK PAIN WITH LEFT-SIDED SCIATICA: ICD-10-CM

## 2020-06-01 PROCEDURE — 72158 MRI LUMBAR SPINE W/O & W/DYE: CPT

## 2020-06-01 PROCEDURE — A9575 INJ GADOTERATE MEGLUMI 0.1ML: HCPCS | Performed by: INTERNAL MEDICINE

## 2020-06-01 PROCEDURE — 74011250636 HC RX REV CODE- 250/636: Performed by: INTERNAL MEDICINE

## 2020-06-01 RX ORDER — GADOTERATE MEGLUMINE 376.9 MG/ML
20 INJECTION INTRAVENOUS ONCE
Status: COMPLETED | OUTPATIENT
Start: 2020-06-01 | End: 2020-06-01

## 2020-06-01 RX ADMIN — GADOTERATE MEGLUMINE 20 ML: 376.9 INJECTION INTRAVENOUS at 08:11

## 2020-06-01 NOTE — PROGRESS NOTES
Yanira Valladares, here are some names for Neurosurgeon [facility] , Raul Delay and [employer] [facility].  You can look it up & let me know if you need a referral.

## 2020-06-02 DIAGNOSIS — M54.50 CHRONIC MIDLINE LOW BACK PAIN WITHOUT SCIATICA: Primary | ICD-10-CM

## 2020-06-02 DIAGNOSIS — G89.29 CHRONIC MIDLINE LOW BACK PAIN WITHOUT SCIATICA: Primary | ICD-10-CM

## 2020-06-02 RX ORDER — DIFLUNISAL 500 MG/1
500 TABLET, FILM COATED ORAL 2 TIMES DAILY
Qty: 30 TAB | Refills: 0 | Status: SHIPPED | OUTPATIENT
Start: 2020-06-02 | End: 2020-06-19 | Stop reason: SDUPTHER

## 2020-06-03 ENCOUNTER — DOCUMENTATION ONLY (OUTPATIENT)
Dept: PRIMARY CARE CLINIC | Age: 45
End: 2020-06-03

## 2020-06-03 NOTE — PROGRESS NOTES
Last office visit notes, imaging results, demographics information faxed to Dr Yoshi Gandara office [facility]: Max Patterson [phone].

## 2020-06-12 DIAGNOSIS — M54.42 CHRONIC LEFT-SIDED LOW BACK PAIN WITH LEFT-SIDED SCIATICA: ICD-10-CM

## 2020-06-12 DIAGNOSIS — G89.29 CHRONIC LEFT-SIDED LOW BACK PAIN WITH LEFT-SIDED SCIATICA: ICD-10-CM

## 2020-06-12 DIAGNOSIS — R20.0 LEFT LEG NUMBNESS: ICD-10-CM

## 2020-06-12 RX ORDER — CYCLOBENZAPRINE HCL 10 MG
10 TABLET ORAL
Qty: 15 TAB | Refills: 0 | Status: SHIPPED | OUTPATIENT
Start: 2020-06-12 | End: 2020-06-29 | Stop reason: SDUPTHER

## 2020-06-19 DIAGNOSIS — M54.50 CHRONIC MIDLINE LOW BACK PAIN WITHOUT SCIATICA: ICD-10-CM

## 2020-06-19 DIAGNOSIS — G89.29 CHRONIC MIDLINE LOW BACK PAIN WITHOUT SCIATICA: ICD-10-CM

## 2020-06-19 RX ORDER — DIFLUNISAL 500 MG/1
500 TABLET, FILM COATED ORAL 2 TIMES DAILY
Qty: 30 TAB | Refills: 0 | Status: SHIPPED | OUTPATIENT
Start: 2020-06-19 | End: 2020-07-06 | Stop reason: SDUPTHER

## 2020-06-29 DIAGNOSIS — G89.29 CHRONIC LEFT-SIDED LOW BACK PAIN WITH LEFT-SIDED SCIATICA: ICD-10-CM

## 2020-06-29 DIAGNOSIS — M54.42 CHRONIC LEFT-SIDED LOW BACK PAIN WITH LEFT-SIDED SCIATICA: ICD-10-CM

## 2020-06-29 DIAGNOSIS — R20.0 LEFT LEG NUMBNESS: ICD-10-CM

## 2020-06-29 RX ORDER — CYCLOBENZAPRINE HCL 10 MG
10 TABLET ORAL
Qty: 15 TAB | Refills: 0 | Status: SHIPPED | OUTPATIENT
Start: 2020-06-29 | End: 2020-07-14

## 2020-07-06 DIAGNOSIS — G89.29 CHRONIC MIDLINE LOW BACK PAIN WITHOUT SCIATICA: ICD-10-CM

## 2020-07-06 DIAGNOSIS — M54.50 CHRONIC MIDLINE LOW BACK PAIN WITHOUT SCIATICA: ICD-10-CM

## 2020-07-07 RX ORDER — DIFLUNISAL 500 MG/1
500 TABLET, FILM COATED ORAL 2 TIMES DAILY
Qty: 30 TAB | Refills: 0 | Status: SHIPPED | OUTPATIENT
Start: 2020-07-07 | End: 2020-07-22

## 2020-09-22 ENCOUNTER — TELEPHONE (OUTPATIENT)
Dept: PRIMARY CARE CLINIC | Age: 45
End: 2020-09-22

## 2020-09-23 DIAGNOSIS — Z00.00 LABORATORY TESTS ORDERED AS PART OF A COMPLETE PHYSICAL EXAM (CPE): ICD-10-CM

## 2020-09-23 DIAGNOSIS — E78.2 MIXED HYPERLIPIDEMIA: Primary | ICD-10-CM

## 2020-09-25 ENCOUNTER — OFFICE VISIT (OUTPATIENT)
Dept: PRIMARY CARE CLINIC | Age: 45
End: 2020-09-25
Payer: COMMERCIAL

## 2020-09-25 VITALS
RESPIRATION RATE: 16 BRPM | DIASTOLIC BLOOD PRESSURE: 90 MMHG | BODY MASS INDEX: 38.08 KG/M2 | TEMPERATURE: 96.8 F | WEIGHT: 242.6 LBS | SYSTOLIC BLOOD PRESSURE: 136 MMHG | HEART RATE: 99 BPM | HEIGHT: 67 IN | OXYGEN SATURATION: 98 %

## 2020-09-25 DIAGNOSIS — Z00.00 LABORATORY TESTS ORDERED AS PART OF A COMPLETE PHYSICAL EXAM (CPE): ICD-10-CM

## 2020-09-25 DIAGNOSIS — M51.36 LUMBAR DEGENERATIVE DISC DISEASE: ICD-10-CM

## 2020-09-25 DIAGNOSIS — Z00.00 PHYSICAL EXAM: Primary | ICD-10-CM

## 2020-09-25 DIAGNOSIS — E78.2 MIXED HYPERLIPIDEMIA: ICD-10-CM

## 2020-09-25 DIAGNOSIS — E66.9 OBESITY (BMI 30-39.9): ICD-10-CM

## 2020-09-25 LAB
ALBUMIN SERPL-MCNC: 4 G/DL (ref 3.5–5)
ALBUMIN/GLOB SERPL: 1.2 {RATIO} (ref 1.1–2.2)
ALP SERPL-CCNC: 58 U/L (ref 45–117)
ALT SERPL-CCNC: 75 U/L (ref 12–78)
ANION GAP SERPL CALC-SCNC: 6 MMOL/L (ref 5–15)
AST SERPL-CCNC: 39 U/L (ref 15–37)
BILIRUB SERPL-MCNC: 0.7 MG/DL (ref 0.2–1)
BUN SERPL-MCNC: 15 MG/DL (ref 6–20)
BUN/CREAT SERPL: 15 (ref 12–20)
CALCIUM SERPL-MCNC: 8.7 MG/DL (ref 8.5–10.1)
CHLORIDE SERPL-SCNC: 104 MMOL/L (ref 97–108)
CHOLEST SERPL-MCNC: 288 MG/DL
CO2 SERPL-SCNC: 28 MMOL/L (ref 21–32)
CREAT SERPL-MCNC: 1.02 MG/DL (ref 0.7–1.3)
ERYTHROCYTE [DISTWIDTH] IN BLOOD BY AUTOMATED COUNT: 13.4 % (ref 11.5–14.5)
GLOBULIN SER CALC-MCNC: 3.4 G/DL (ref 2–4)
GLUCOSE SERPL-MCNC: 86 MG/DL (ref 65–100)
HCT VFR BLD AUTO: 48.5 % (ref 36.6–50.3)
HDLC SERPL-MCNC: 49 MG/DL
HDLC SERPL: 5.9 {RATIO} (ref 0–5)
HGB BLD-MCNC: 16.7 G/DL (ref 12.1–17)
LDLC SERPL CALC-MCNC: 200.8 MG/DL (ref 0–100)
LIPID PROFILE,FLP: ABNORMAL
MCH RBC QN AUTO: 32.5 PG (ref 26–34)
MCHC RBC AUTO-ENTMCNC: 34.4 G/DL (ref 30–36.5)
MCV RBC AUTO: 94.4 FL (ref 80–99)
NRBC # BLD: 0 K/UL (ref 0–0.01)
NRBC BLD-RTO: 0 PER 100 WBC
PLATELET # BLD AUTO: 249 K/UL (ref 150–400)
PMV BLD AUTO: 9.2 FL (ref 8.9–12.9)
POTASSIUM SERPL-SCNC: 3.9 MMOL/L (ref 3.5–5.1)
PROT SERPL-MCNC: 7.4 G/DL (ref 6.4–8.2)
RBC # BLD AUTO: 5.14 M/UL (ref 4.1–5.7)
SODIUM SERPL-SCNC: 138 MMOL/L (ref 136–145)
TRIGL SERPL-MCNC: 191 MG/DL (ref ?–150)
TSH SERPL DL<=0.05 MIU/L-ACNC: 2.02 UIU/ML (ref 0.36–3.74)
VLDLC SERPL CALC-MCNC: 38.2 MG/DL
WBC # BLD AUTO: 8 K/UL (ref 4.1–11.1)

## 2020-09-25 PROCEDURE — 99396 PREV VISIT EST AGE 40-64: CPT | Performed by: INTERNAL MEDICINE

## 2020-09-25 RX ORDER — GABAPENTIN 300 MG/1
300 CAPSULE ORAL 2 TIMES DAILY
COMMUNITY
Start: 2020-09-03 | End: 2021-04-14

## 2020-09-25 RX ORDER — DICLOFENAC SODIUM 75 MG/1
TABLET, DELAYED RELEASE ORAL
COMMUNITY
Start: 2020-08-31 | End: 2021-03-02

## 2020-09-25 RX ORDER — CYCLOBENZAPRINE HCL 10 MG
5 TABLET ORAL
COMMUNITY
Start: 2020-08-31 | End: 2021-03-03 | Stop reason: CLARIF

## 2020-09-25 NOTE — PROGRESS NOTES
Written by Francisco Javier Odell, as dictated by Dr. Karl Jaramillo MD.    Kelle Adan is a 50 y.o. male. HPI  The patient comes in today for a complete physical examination. He will come back to have labs done. Pt's BP is elevated today in office at 129/90, 136/90 on manual repeat in R arm while sitting down. He has gained some weight from taking gabapentin, and he also has not been able to exercise very much due to his back pain. He has also been snoring more since he gained weight. His back is hurting today. He is working with Dr. Sarahi Lim and is going to have an operation done on his back in 10/20. He has tried two injections with Dr. Oc Mijares, but they actually only lasted for a few hours and did not make a difference for him. He is taking medications through their office to manage his gabapentin, diclofenac, and flexeril regimen until he has the operation done. He has a pre-op scheduled with Dr. Oc Mijares office  in October. Denies heartburn or constipation. He has no known fhx of colon cancer. He also denies any recent issues relate to allergies or HA and notes that his back pain is his only complaint. He is planning to get his flu vaccine through work. Patient Active Problem List   Diagnosis Code    Obesity (BMI 30-39. 9) E66.9        Current Outpatient Medications on File Prior to Visit   Medication Sig Dispense Refill    cyclobenzaprine (FLEXERIL) 10 mg tablet TK 1 T PO MUSCLE BID PRN      diclofenac EC (VOLTAREN) 75 mg EC tablet TK 1 T PO BID      gabapentin (NEURONTIN) 300 mg capsule       lidocaine (LIDODERM) 5 % Apply patch to the affected area for 12 hours a day and remove for 12 hours a day. 5 Patch 1    predniSONE (DELTASONE) 20 mg tablet Prednisone 60 mg po x 2 days,40 mg po x 2 days,20 mg po x 1 day,10 mg po x 1 day then stop. 12 Tab 0     No current facility-administered medications on file prior to visit.         No Known Allergies    No past medical history on file. No past surgical history on file. Family History   Problem Relation Age of Onset    Hypertension Father        Social History     Socioeconomic History    Marital status:      Spouse name: Not on file    Number of children: Not on file    Years of education: Not on file    Highest education level: Not on file   Occupational History    Not on file   Social Needs    Financial resource strain: Not on file    Food insecurity     Worry: Not on file     Inability: Not on file    Transportation needs     Medical: Not on file     Non-medical: Not on file   Tobacco Use    Smoking status: Never Smoker    Smokeless tobacco: Never Used   Substance and Sexual Activity    Alcohol use: Yes     Comment: socially     Drug use: No    Sexual activity: Yes     Partners: Female   Lifestyle    Physical activity     Days per week: Not on file     Minutes per session: Not on file    Stress: Not on file   Relationships    Social connections     Talks on phone: Not on file     Gets together: Not on file     Attends Pent[employer] service: Not on file     Active member of club or organization: Not on file     Attends meetings of clubs or organizations: Not on file     Relationship status: Not on file    Intimate partner violence     Fear of current or ex partner: Not on file     Emotionally abused: Not on file     Physically abused: Not on file     Forced sexual activity: Not on file   Other Topics Concern    Not on file   Social History Narrative    Not on file       No visits with results within 3 Month(s) from this visit. Latest known visit with results is:   Admission on 03/19/2020, Discharged on 03/19/2020   Component Date Value Ref Range Status    Influenza A Antigen 03/19/2020 NEGATIVE   NEG   Final    Influenza B Antigen 03/19/2020 NEGATIVE   NEG   Final     Review of Systems   Constitutional: Negative for malaise/fatigue and weight loss.    HENT: Negative for congestion and sore throat. Eyes: Negative for blurred vision. Respiratory: Negative for cough and shortness of breath.         +snores   Cardiovascular: Negative for chest pain and leg swelling. Gastrointestinal: Negative for constipation and heartburn. Genitourinary: Negative for frequency and urgency. Musculoskeletal: Positive for back pain. Negative for joint pain and myalgias. Neurological: Negative for dizziness and headaches. Psychiatric/Behavioral: Negative for depression. The patient is not nervous/anxious and does not have insomnia. Visit Vitals  BP (!) 136/90 (BP 1 Location: Right arm, BP Patient Position: Sitting)   Pulse 99   Temp 96.8 °F (36 °C) (Temporal)   Resp 16   Ht 5' 7\" (1.702 m)   Wt 242 lb 9.6 oz (110 kg)   SpO2 98%   BMI 38.00 kg/m²     Physical Exam  Vitals signs and nursing note reviewed. Constitutional:       General: He is not in acute distress. Appearance: Normal appearance. He is well-developed and well-groomed. He is obese. He is not diaphoretic. HENT:      Right Ear: Tympanic membrane, ear canal and external ear normal.      Left Ear: Tympanic membrane, ear canal and external ear normal.      Mouth/Throat:      Mouth: Mucous membranes are moist.      Pharynx: Oropharynx is clear. Eyes:      General: No scleral icterus. Right eye: No discharge. Left eye: No discharge. Extraocular Movements: Extraocular movements intact. Conjunctiva/sclera: Conjunctivae normal.   Neck:      Musculoskeletal: Normal range of motion and neck supple. Thyroid: No thyromegaly. Cardiovascular:      Rate and Rhythm: Normal rate and regular rhythm. Pulses: Normal pulses. Dorsalis pedis pulses are 2+ on the right side and 2+ on the left side. Pulmonary:      Effort: Pulmonary effort is normal.      Breath sounds: Normal breath sounds. No wheezing. Abdominal:      General: Bowel sounds are normal. There is no distension. Palpations: Abdomen is soft. Tenderness: There is no abdominal tenderness. Musculoskeletal:      Right lower leg: Pitting Edema present. Left lower leg: Pitting Edema present. Comments: B/L knees without crepitus   Lymphadenopathy:      Cervical: No cervical adenopathy. Neurological:      Mental Status: He is alert and oriented to person, place, and time. Deep Tendon Reflexes:      Reflex Scores:       Patellar reflexes are 2+ on the right side and 2+ on the left side. Psychiatric:         Mood and Affect: Mood and affect normal.         Behavior: Behavior normal.       ASSESSMENT and PLAN    ICD-10-CM ICD-9-CM    1. Physical exam  Z00.00 V70.9 Complete physical exam done. 2. Lumbar degenerative disc disease  M51.36 722.52 Pt has surgery with Dr. Blaine Cho coming up on 10/191/20. Will monitor for changes or improvements. 3. Obesity (BMI 30-39. 9)  E66.9 278.00 He is watching his diet, but it is difficult for him to exercise at all with his back pain. I explained that gabapentin causes weight gain and that he should be weaned off of it gradually after his surgery. This plan was reviewed with the patient and patient agrees. All questions were answered. This scribe documentation was reviewed by me and accurately reflects the examination and decisions made by me. This note will not be viewable in [address]

## 2020-09-25 NOTE — PROGRESS NOTES
Chief Complaint   Patient presents with    Complete Physical     patient presents for his annual physical and labs

## 2020-09-28 NOTE — PROGRESS NOTES
Alec,hope you enjoyed your weekend. Your blood report is back & cholesterol numbers came back very high as expected. I know you can`t do exercise. Try to cut down on your carbs & sweets intake. Rest of your blood work came back fine.

## 2020-10-09 ENCOUNTER — HOSPITAL ENCOUNTER (OUTPATIENT)
Dept: PREADMISSION TESTING | Age: 45
Discharge: HOME | End: 2020-10-09

## 2020-10-09 VITALS
WEIGHT: 274.69 LBS | SYSTOLIC BLOOD PRESSURE: 131 MMHG | RESPIRATION RATE: 16 BRPM | BODY MASS INDEX: 43.11 KG/M2 | HEART RATE: 82 BPM | DIASTOLIC BLOOD PRESSURE: 88 MMHG | HEIGHT: 67 IN | TEMPERATURE: 98 F

## 2020-10-09 NOTE — PERIOP NOTES
DO NOT EAT OR DRINK ANYTHING AFTER MIDNIGHT, except as instructed THE NIGHT BEFORE SURGERY. PT GIVEN OPPORTUNITY TO ASK ADDITIONAL QUESTIONS. Patient given two bottles CHG soap and instruction sheet, instructions for use reviewed with patient. Patient given surgical site infection information FAQs handout and hand hygiene tips sheet. Pre-operative instructions reviewed and patient verbalizes understanding of instructions. Patient has been given the opportunity to ask additional questions. [facility] APPOINTMENTS REVIEWED AND GIVEN TO PATIENT. COVID-19 INSTRUCTION SHEET ALSO REVIEWED AND GIVEN TO PATIENT.

## 2020-10-10 LAB
BACTERIA SPEC CULT: NORMAL
BACTERIA SPEC CULT: NORMAL
SERVICE CMNT-IMP: NORMAL

## 2021-01-05 ENCOUNTER — VIRTUAL VISIT (OUTPATIENT)
Dept: PRIMARY CARE CLINIC | Age: 46
End: 2021-01-05
Payer: COMMERCIAL

## 2021-01-05 DIAGNOSIS — U07.1 COVID-19: Primary | ICD-10-CM

## 2021-01-05 PROCEDURE — 99213 OFFICE O/P EST LOW 20 MIN: CPT | Performed by: NURSE PRACTITIONER

## 2021-01-05 RX ORDER — AZITHROMYCIN 250 MG/1
TABLET, FILM COATED ORAL
Qty: 6 TAB | Refills: 0 | Status: SHIPPED | OUTPATIENT
Start: 2021-01-05 | End: 2021-01-10

## 2021-01-05 RX ORDER — PREDNISONE 10 MG/1
TABLET ORAL
Qty: 21 TAB | Refills: 0 | Status: SHIPPED | OUTPATIENT
Start: 2021-01-05 | End: 2021-02-19

## 2021-01-05 RX ORDER — BENZONATATE 200 MG/1
200 CAPSULE ORAL
Qty: 21 CAP | Refills: 0 | Status: SHIPPED | OUTPATIENT
Start: 2021-01-05 | End: 2021-01-12

## 2021-01-05 NOTE — PROGRESS NOTES
[facility]   [facility] [address] [facility], [address] [place] [address]  P: [phone]  F: Shahzad [id number] Kassidy Portillo is a 50 y.o. male who is seen over telehealth for Cough , body aches, fever, chills with recent diagnosis of COVID-19. Reports multiple other family members have [phone]. He is currently taking multiple OTC medicines including Tylenol Cold and flu, Mucinex, Delsym cough syrup, and vitamin C/zinc/vitamin D.  Presents today as his symptoms are persisting over 2 weeks, concerned that he is extremely fatigued and having persistent cough with chest congestion. Has some shortness of breath on exertion. He has a pulse ox at home and reports his current O2 sat is 97%. Patient Active Problem List    Diagnosis    Obesity (BMI 30-39. 9)      Past Medical History:   Diagnosis Date    Arthritis     Chronic pain     LOWER BACK     Past Surgical History:   Procedure Laterality Date    HX HEENT      WISDOM TEETH EXTRACTED    HX VASECTOMY       Social History     Socioeconomic History    Marital status:      Spouse name: Not on file    Number of children: Not on file    Years of education: Not on file    Highest education level: Not on file   Occupational History    Not on file   Social Needs    Financial resource strain: Not on file    Food insecurity     Worry: Not on file     Inability: Not on file    Transportation needs     Medical: Not on file     Non-medical: Not on file   Tobacco Use    Smoking status: Former Smoker     Packs/day: 1.00     Years: 10.00     Pack years: 10.00     Quit date:      Years since quittin.0    Smokeless tobacco: Former User     Quit date:    Substance and Sexual Activity    Alcohol use: Yes     Comment: socially     Drug use: Not Currently    Sexual activity: Yes     Partners: Female   Lifestyle    Physical activity     Days per week: Not on file     Minutes per session: Not on file    Stress: Not on file Relationships    Social connections     Talks on phone: Not on file     Gets together: Not on file     Attends Synagogue service: Not on file     Active member of club or organization: Not on file     Attends meetings of clubs or organizations: Not on file     Relationship status: Not on file    Intimate partner violence     Fear of current or ex partner: Not on file     Emotionally abused: Not on file     Physically abused: Not on file     Forced sexual activity: Not on file   Other Topics Concern    Not on file   Social History Narrative    Not on file     Family History   Problem Relation Age of Onset    Dementia Mother     Hypertension Father     No Known Problems Sister     Diabetes Brother     No Known Problems Sister     No Known Problems Son     No Known Problems Son     No Known Problems Son     Anesth Problems Neg Hx      No Known Allergies    Current Outpatient Medications   Medication Sig Dispense Refill    azithromycin (ZITHROMAX) 250 mg tablet Take 2 tablets today, then take 1 tablet daily 6 Tab 0    predniSONE (STERAPRED DS) 10 mg dose pack Take as directed. See administration instruction per 10mg dose pack 21 Tab 0    benzonatate (TESSALON) 200 mg capsule Take 1 Cap by mouth three (3) times daily as needed for Cough for up to 7 days. 21 Cap 0    cyclobenzaprine (FLEXERIL) 10 mg tablet TK 1 T PO MUSCLE BID PRN      diclofenac EC (VOLTAREN) 75 mg EC tablet TK 1 T PO BID      gabapentin (NEURONTIN) 300 mg capsule Take 300 mg by mouth four (4) times daily. The medications were reviewed and updated in the medical record. The past medical history, past surgical history, and family history were reviewed and updated in the medical record. REVIEW OF SYSTEMS   Review of Systems   Constitutional: Positive for chills, fever and malaise/fatigue. HENT: Negative for congestion. Eyes: Negative for blurred vision and pain.    Respiratory: Positive for cough and shortness of breath. Cardiovascular: Negative for chest pain and palpitations. Gastrointestinal: Negative for abdominal pain and heartburn. Genitourinary: Negative for frequency and urgency. Musculoskeletal: Positive for myalgias. Negative for joint pain. Neurological: Negative for dizziness, tingling, sensory change, weakness and headaches. Psychiatric/Behavioral: Negative for depression, memory loss and substance abuse. PHYSICAL EXAM   NO VITALS WERE TAKEN FOR THIS VISIT    Physical Exam  Vitals signs and nursing note reviewed. Constitutional:       Appearance: Normal appearance. HENT:      Head: Normocephalic and atraumatic. Right Ear: External ear normal.      Left Ear: External ear normal.   Pulmonary:      Effort: Pulmonary effort is normal.   Musculoskeletal: Normal range of motion. Skin:     General: Skin is warm and dry. Neurological:      Mental Status: He is alert and oriented to person, place, and time. Mental status is at baseline. Gait: Gait is intact. Psychiatric:         Mood and Affect: Affect normal.         Speech: Speech normal.         Thought Content: Thought content normal.         Judgment: Judgment normal.       ASSESSMENT/ PLAN   Diagnoses and all orders for this visit:    1. COVID-19  -     azithromycin (ZITHROMAX) 250 mg tablet; Take 2 tablets today, then take 1 tablet daily  -     predniSONE (STERAPRED DS) 10 mg dose pack; Take as directed. See administration instruction per 10mg dose pack  -     benzonatate (TESSALON) 200 mg capsule; Take 1 Cap by mouth three (3) times daily as needed for Cough for up to 7 days.  -We discussed there are limited treatment for COVID-19, due to concern for pneumonia will treat with a Z-Kenyon and steroids. Tessalon as needed for cough. I was at home while conducting this encounter.     Consent:  He and/or his healthcare decision maker is aware that this patient-initiated Telehealth encounter is a billable service, with coverage as determined by his insurance carrier. He is aware that he may receive a bill and has provided verbal consent to proceed: Yes    This virtual visit was conducted via [employer]. Pursuant to the emergency declaration under the [facility], [facility] waiver authority and the [employer] and [employer] Act, this Virtual  Visit was conducted to reduce the patient's risk of exposure to COVID-19 and provide continuity of care for an established patient. Services were provided through a video synchronous discussion virtually to substitute for in-person clinic visit. Due to this being a TeleHealth evaluation, many elements of the physical examination are unable to be assessed. Total Time: minutes: 11-20 minutes. Disclaimer:  Advised patient to call back or return to office if symptoms worsen/change/persist.  Discussed expected course/resolution/complications of diagnosis in detail with patient. Medication risks/benefits/alternatives discussed with patient. Patient was given an after visit summary which includes diagnoses, current medications, & vitals. Discussed patient instructions and advised to read to all patient instructions regarding care. Patient expressed understanding with the diagnosis and plan. This note will not be viewable in [address]         Bozena Cohen NP  1/5/2021        (This document has been electronically signed)

## 2021-02-09 ENCOUNTER — TRANSCRIBE ORDER (OUTPATIENT)
Dept: REGISTRATION | Age: 46
End: 2021-02-09

## 2021-02-09 DIAGNOSIS — Z01.812 PRE-PROCEDURE LAB EXAM: Primary | ICD-10-CM

## 2021-02-09 NOTE — PERIOP NOTES
PATIENT'S WIFE CALLED AND MADE AWARE OF COVID-19 TESTING NEEDED TO BE DONE WITHIN 96 HOURS OF SURGERY. COVID-19 TESTING APPOINTMENT MADE FOR PATIENT. PATIENT'S WIFE INSTRUCTED ON SELF QUARANTINE BETWEEN TESTING AND ARRIVAL TIME DAY OF SURGERY.

## 2021-02-19 ENCOUNTER — HOSPITAL ENCOUNTER (OUTPATIENT)
Dept: PREADMISSION TESTING | Age: 46
Discharge: HOME | End: 2021-02-19
Payer: COMMERCIAL

## 2021-02-19 VITALS
SYSTOLIC BLOOD PRESSURE: 132 MMHG | HEART RATE: 99 BPM | DIASTOLIC BLOOD PRESSURE: 91 MMHG | HEIGHT: 66 IN | BODY MASS INDEX: 46.06 KG/M2 | WEIGHT: 286.6 LBS | TEMPERATURE: 97.8 F

## 2021-02-19 LAB
BASOPHILS # BLD: 0.1 K/UL (ref 0–0.1)
BASOPHILS NFR BLD: 1 % (ref 0–1)
DIFFERENTIAL METHOD BLD: ABNORMAL
EOSINOPHIL # BLD: 0.1 K/UL (ref 0–0.4)
EOSINOPHIL NFR BLD: 1 % (ref 0–7)
ERYTHROCYTE [DISTWIDTH] IN BLOOD BY AUTOMATED COUNT: 13.2 % (ref 11.5–14.5)
HCT VFR BLD AUTO: 44.6 % (ref 36.6–50.3)
HGB BLD-MCNC: 15.1 G/DL (ref 12.1–17)
IMM GRANULOCYTES # BLD AUTO: 0.1 K/UL (ref 0–0.04)
IMM GRANULOCYTES NFR BLD AUTO: 2 % (ref 0–0.5)
LYMPHOCYTES # BLD: 1.8 K/UL (ref 0.8–3.5)
LYMPHOCYTES NFR BLD: 25 % (ref 12–49)
MCH RBC QN AUTO: 32.4 PG (ref 26–34)
MCHC RBC AUTO-ENTMCNC: 33.9 G/DL (ref 30–36.5)
MCV RBC AUTO: 95.7 FL (ref 80–99)
MONOCYTES # BLD: 0.7 K/UL (ref 0–1)
MONOCYTES NFR BLD: 11 % (ref 5–13)
NEUTS SEG # BLD: 4.2 K/UL (ref 1.8–8)
NEUTS SEG NFR BLD: 60 % (ref 32–75)
NRBC # BLD: 0 K/UL (ref 0–0.01)
NRBC BLD-RTO: 0 PER 100 WBC
PLATELET # BLD AUTO: 230 K/UL (ref 150–400)
PMV BLD AUTO: 9.2 FL (ref 8.9–12.9)
RBC # BLD AUTO: 4.66 M/UL (ref 4.1–5.7)
WBC # BLD AUTO: 7 K/UL (ref 4.1–11.1)

## 2021-02-19 PROCEDURE — 36415 COLL VENOUS BLD VENIPUNCTURE: CPT

## 2021-02-19 PROCEDURE — 85025 COMPLETE CBC W/AUTO DIFF WBC: CPT

## 2021-02-19 RX ORDER — MELATONIN
2000 DAILY
COMMUNITY

## 2021-02-19 RX ORDER — GUAIFENESIN 100 MG/5ML
81 LIQUID (ML) ORAL DAILY
COMMUNITY
End: 2021-04-14

## 2021-02-19 RX ORDER — ASCORBIC ACID 250 MG
TABLET ORAL DAILY
COMMUNITY
End: 2021-04-14

## 2021-02-19 RX ORDER — TRAMADOL HYDROCHLORIDE 50 MG/1
50 TABLET ORAL
COMMUNITY
End: 2021-04-14

## 2021-02-19 NOTE — PERIOP NOTES
PREOPERATIVE INSTRUCTIONS REVIEWED WITH PATIENT. PATIENT GIVEN TWO-- BOTTLES CHG SOAP. INSTRUCTIONS REVIEWED ON USE OF CHG SOAP. PATIENT GIVEN SSI INFECTIONS SHEET. PATIENT WAS GIVEN THE OPPORTUNITY TO ASK QUESTIONS ON THE INFORMATION PROVIDED. Written instructions given to patient and reviewed re: preop Covid 19 testing and protocol for day of surgery. Patient verbalized understanding of need for self quarantine during the four days preop.

## 2021-02-20 LAB
BACTERIA SPEC CULT: NORMAL
BACTERIA SPEC CULT: NORMAL
SERVICE CMNT-IMP: NORMAL

## 2021-02-21 ENCOUNTER — HOSPITAL ENCOUNTER (OUTPATIENT)
Dept: PREADMISSION TESTING | Age: 46
Discharge: HOME | End: 2021-02-21
Payer: COMMERCIAL

## 2021-02-21 DIAGNOSIS — Z01.812 PRE-PROCEDURE LAB EXAM: ICD-10-CM

## 2021-02-21 PROCEDURE — U0003 INFECTIOUS AGENT DETECTION BY NUCLEIC ACID (DNA OR RNA); SEVERE ACUTE RESPIRATORY SYNDROME CORONAVIRUS 2 (SARS-COV-2) (CORONAVIRUS DISEASE [COVID-19]), AMPLIFIED PROBE TECHNIQUE, MAKING USE OF HIGH THROUGHPUT TECHNOLOGIES AS DESCRIBED BY CMS-2020-01-R: HCPCS

## 2021-02-22 LAB — SARS-COV-2, COV2NT: NOT DETECTED

## 2021-02-22 NOTE — PERIOP NOTES
PAT Nurse Practitioner   Pre-Operative Chart Review/Assessment:-ORTHOPEDIC/NEUROSURGICAL SPINE                Patient Name:  Papi Caldwell                                                           Age:   50 y.o.    :  1975     Today's Date:  2021     Date of PAT:   2021      Date of Surgery:    2021      Procedure(s):  L4-L5 Laminectomy and Fusion     Surgeon:   Dr. Denise Jade:       1)  PCP: Dr. Nicolasa Pizarro        2)  Cardiac Clearance: Not requested. 3)  Diabetic Treatment Consult: A1c not ordered. No hx of DM. 4)  Sleep Apnea evaluation:  Not indicated. GILSON Score 2.       5)  Treatment for MRSA/Staph Aureus: Neg       6)  Additional Concerns: Former smoker    **Followed by Dr. Marylee Koller, NP for pain management**              Vital Signs:         Vitals:    21 1527 21 1606   BP: (!) 143/97 (!) 132/91   Pulse: (!) 102 99   Temp: 97.8 °F (36.6 °C)    Weight: 130 kg (286 lb 9.6 oz)    Height: 5' 6\" (1.676 m)             ____________________________________________  PAST MEDICAL HISTORY  Past Medical History:   Diagnosis Date    Arthritis     back    Chronic pain     LOWER BACK      ____________________________________________  PAST SURGICAL HISTORY  Past Surgical History:   Procedure Laterality Date    HX VASECTOMY     [de-identified] WISDOM TEETH EXTRACTION        ____________________________________________  HOME MEDICATIONS    Current Outpatient Medications   Medication Sig    aspirin 81 mg chewable tablet Take 81 mg by mouth daily.  traMADoL (ULTRAM) 50 mg tablet Take 50 mg by mouth every eight (8) hours as needed for Pain.  zinc 50 mg tab tablet Take  by mouth daily.  ascorbic acid, vitamin C, (Vitamin C) 250 mg tablet Take  by mouth daily.  cholecalciferol (Vitamin D3) (1000 Units /25 mcg) tablet Take 2,000 Units by mouth daily.  cyclobenzaprine (FLEXERIL) 10 mg tablet 5 mg five (5) times daily.     diclofenac EC (VOLTAREN) 75 mg EC tablet TK 1 T PO BID    gabapentin (NEURONTIN) 300 mg capsule Take 300 mg by mouth two (2) times a day. No current facility-administered medications for this encounter.       ____________________________________________  ALLERGIES  No Known Allergies   ____________________________________________  SOCIAL HISTORY  Social History     Tobacco Use    Smoking status: Former Smoker     Packs/day: 1.00     Years: 10.00     Pack years: 10.00     Quit date:      Years since quittin.1    Smokeless tobacco: Former User     Quit date:    Substance Use Topics    Alcohol use: Yes     Comment: socially; 8-12 beer/month      ____________________________________________        Labs:     Hospital Outpatient Visit on 2021   Component Date Value Ref Range Status    Special Requests: 2021 NO SPECIAL REQUESTS    Final    Culture result: 2021 MRSA NOT PRESENT    Final            WBC 2021 7.0  4.1 - 11.1 K/uL Final    RBC 2021 4.66  4.10 - 5.70 M/uL Final    HGB 2021 15.1  12.1 - 17.0 g/dL Final    HCT 2021 44.6  36.6 - 50.3 % Final    MCV 2021 95.7  80.0 - 99.0 FL Final    MCH 2021 32.4  26.0 - 34.0 PG Final    MCHC 2021 33.9  30.0 - 36.5 g/dL Final    RDW 2021 13.2  11.5 - 14.5 % Final    PLATELET  035  150 - 400 K/uL Final    MPV 2021 9.2  8.9 - 12.9 FL Final    NRBC 2021 0.0  0  WBC Final    ABSOLUTE NRBC 2021 0.00  0.00 - 0.01 K/uL Final    NEUTROPHILS 2021 60  32 - 75 % Final    LYMPHOCYTES 2021 25  12 - 49 % Final    MONOCYTES 2021 11  5 - 13 % Final    EOSINOPHILS 2021 1  0 - 7 % Final    BASOPHILS 2021 1  0 - 1 % Final    IMMATURE GRANULOCYTES 2021 2* 0.0 - 0.5 % Final    ABS. NEUTROPHILS 2021 4.2  1.8 - 8.0 K/UL Final    ABS. LYMPHOCYTES 2021 1.8  0.8 - 3.5 K/UL Final    ABS. MONOCYTES 2021 0.7  0.0 - 1.0 K/UL Final    ABS. EOSINOPHILS 02/19/2021 0.1  0.0 - 0.4 K/UL Final    ABS. BASOPHILS 02/19/2021 0.1  0.0 - 0.1 K/UL Final    ABS. IMM. GRANS. 02/19/2021 0.1* 0.00 - 0.04 K/UL Final    DF 02/19/2021 AUTOMATED    Final        Skin:     Denies open wounds, cuts, sores, rashes or other areas of concern in PAT assessment.         Aleta Eckert NP

## 2021-02-25 ENCOUNTER — ANESTHESIA (OUTPATIENT)
Dept: SURGERY | Age: 46
DRG: 454 | End: 2021-02-25
Payer: COMMERCIAL

## 2021-02-25 ENCOUNTER — APPOINTMENT (OUTPATIENT)
Dept: GENERAL RADIOLOGY | Age: 46
DRG: 454 | End: 2021-02-25
Attending: NEUROLOGICAL SURGERY
Payer: COMMERCIAL

## 2021-02-25 ENCOUNTER — HOSPITAL ENCOUNTER (INPATIENT)
Age: 46
LOS: 5 days | Discharge: HOME HEALTH | DRG: 454 | End: 2021-03-02
Attending: NEUROLOGICAL SURGERY | Admitting: NEUROLOGICAL SURGERY
Payer: COMMERCIAL

## 2021-02-25 ENCOUNTER — ANESTHESIA EVENT (OUTPATIENT)
Dept: SURGERY | Age: 46
DRG: 454 | End: 2021-02-25
Payer: COMMERCIAL

## 2021-02-25 DIAGNOSIS — Z98.1 S/P LUMBAR FUSION: Primary | ICD-10-CM

## 2021-02-25 PROBLEM — M53.2X6 LUMBAR SPINE INSTABILITY: Status: ACTIVE | Noted: 2021-02-25

## 2021-02-25 LAB
GLUCOSE BLD STRIP.AUTO-MCNC: 114 MG/DL (ref 65–100)
SERVICE CMNT-IMP: ABNORMAL

## 2021-02-25 PROCEDURE — 77030029099 HC BN WAX SSPC -A: Performed by: NEUROLOGICAL SURGERY

## 2021-02-25 PROCEDURE — 0SG00AJ FUSION OF LUMBAR VERTEBRAL JOINT WITH INTERBODY FUSION DEVICE, POSTERIOR APPROACH, ANTERIOR COLUMN, OPEN APPROACH: ICD-10-PCS | Performed by: NEUROLOGICAL SURGERY

## 2021-02-25 PROCEDURE — 65270000029 HC RM PRIVATE

## 2021-02-25 PROCEDURE — 77030005513 HC CATH URETH FOL11 MDII -B: Performed by: NEUROLOGICAL SURGERY

## 2021-02-25 PROCEDURE — 77030012890

## 2021-02-25 PROCEDURE — 74011250637 HC RX REV CODE- 250/637: Performed by: ANESTHESIOLOGY

## 2021-02-25 PROCEDURE — 76010000175 HC OR TIME 4 TO 4.5 HR INTENSV-TIER 1: Performed by: NEUROLOGICAL SURGERY

## 2021-02-25 PROCEDURE — 77030038600 HC TU BPLR IRR DISP STRY -B: Performed by: NEUROLOGICAL SURGERY

## 2021-02-25 PROCEDURE — 0SG0071 FUSION OF LUMBAR VERTEBRAL JOINT WITH AUTOLOGOUS TISSUE SUBSTITUTE, POSTERIOR APPROACH, POSTERIOR COLUMN, OPEN APPROACH: ICD-10-PCS | Performed by: NEUROLOGICAL SURGERY

## 2021-02-25 PROCEDURE — 74011000258 HC RX REV CODE- 258: Performed by: NURSE ANESTHETIST, CERTIFIED REGISTERED

## 2021-02-25 PROCEDURE — 74011250636 HC RX REV CODE- 250/636: Performed by: NEUROLOGICAL SURGERY

## 2021-02-25 PROCEDURE — 77030028271 HC SRGFL HEMSTAT MTRX KT J&J -C: Performed by: NEUROLOGICAL SURGERY

## 2021-02-25 PROCEDURE — 77030003029 HC SUT VCRL J&J -B: Performed by: NEUROLOGICAL SURGERY

## 2021-02-25 PROCEDURE — 74011250636 HC RX REV CODE- 250/636: Performed by: ANESTHESIOLOGY

## 2021-02-25 PROCEDURE — 77030040361 HC SLV COMPR DVT MDII -B: Performed by: NEUROLOGICAL SURGERY

## 2021-02-25 PROCEDURE — 74011250636 HC RX REV CODE- 250/636: Performed by: NURSE ANESTHETIST, CERTIFIED REGISTERED

## 2021-02-25 PROCEDURE — 76060000039 HC ANESTHESIA 4 TO 4.5 HR: Performed by: NEUROLOGICAL SURGERY

## 2021-02-25 PROCEDURE — 00NY0ZZ RELEASE LUMBAR SPINAL CORD, OPEN APPROACH: ICD-10-PCS | Performed by: NEUROLOGICAL SURGERY

## 2021-02-25 PROCEDURE — 77030010813: Performed by: NEUROLOGICAL SURGERY

## 2021-02-25 PROCEDURE — 77030002933 HC SUT MCRYL J&J -A: Performed by: NEUROLOGICAL SURGERY

## 2021-02-25 PROCEDURE — 74011000250 HC RX REV CODE- 250: Performed by: NEUROLOGICAL SURGERY

## 2021-02-25 PROCEDURE — 2709999900 HC NON-CHARGEABLE SUPPLY

## 2021-02-25 PROCEDURE — C1713 ANCHOR/SCREW BN/BN,TIS/BN: HCPCS | Performed by: NEUROLOGICAL SURGERY

## 2021-02-25 PROCEDURE — P9045 ALBUMIN (HUMAN), 5%, 250 ML: HCPCS | Performed by: NURSE ANESTHETIST, CERTIFIED REGISTERED

## 2021-02-25 PROCEDURE — 2709999900 HC NON-CHARGEABLE SUPPLY: Performed by: NEUROLOGICAL SURGERY

## 2021-02-25 PROCEDURE — 77030032958 HC GRFT BN SUB ELITE TRNTY MUSC -K1: Performed by: NEUROLOGICAL SURGERY

## 2021-02-25 PROCEDURE — 77030040922 HC BLNKT HYPOTHRM STRY -A

## 2021-02-25 PROCEDURE — 76210000016 HC OR PH I REC 1 TO 1.5 HR: Performed by: NEUROLOGICAL SURGERY

## 2021-02-25 PROCEDURE — C1889 IMPLANT/INSERT DEVICE, NOC: HCPCS | Performed by: NEUROLOGICAL SURGERY

## 2021-02-25 PROCEDURE — 77030008684 HC TU ET CUF COVD -B: Performed by: ANESTHESIOLOGY

## 2021-02-25 PROCEDURE — 77030018723 HC ELCTRD BLD COVD -A: Performed by: NEUROLOGICAL SURGERY

## 2021-02-25 PROCEDURE — 77030026438 HC STYL ET INTUB CARD -A: Performed by: ANESTHESIOLOGY

## 2021-02-25 PROCEDURE — 77030038552 HC DRN WND MDII -A: Performed by: NEUROLOGICAL SURGERY

## 2021-02-25 PROCEDURE — 01NB0ZZ RELEASE LUMBAR NERVE, OPEN APPROACH: ICD-10-PCS | Performed by: NEUROLOGICAL SURGERY

## 2021-02-25 PROCEDURE — 74011000250 HC RX REV CODE- 250: Performed by: NURSE ANESTHETIST, CERTIFIED REGISTERED

## 2021-02-25 PROCEDURE — 74011250637 HC RX REV CODE- 250/637: Performed by: NEUROLOGICAL SURGERY

## 2021-02-25 PROCEDURE — 0ST20ZZ RESECTION OF LUMBAR VERTEBRAL DISC, OPEN APPROACH: ICD-10-PCS | Performed by: NEUROLOGICAL SURGERY

## 2021-02-25 PROCEDURE — 77030004391 HC BUR FLUT MEDT -C: Performed by: NEUROLOGICAL SURGERY

## 2021-02-25 PROCEDURE — 74011000258 HC RX REV CODE- 258: Performed by: NEUROLOGICAL SURGERY

## 2021-02-25 PROCEDURE — 82962 GLUCOSE BLOOD TEST: CPT

## 2021-02-25 DEVICE — SPACER 7770828 ELEVATE STD 28X8MM
Type: IMPLANTABLE DEVICE | Site: SPINE LUMBAR | Status: FUNCTIONAL
Brand: ELEVATE™ SPINAL SYSTEM

## 2021-02-25 DEVICE — ROD 1553201035 5.5 TI CP4 NS CURV 35MM
Type: IMPLANTABLE DEVICE | Site: SPINE LUMBAR | Status: FUNCTIONAL
Brand: CD HORIZON® SPINAL SYSTEM

## 2021-02-25 DEVICE — GRAFT BNE SUB L CANC FRZN MORSELIZED W/ VIABLE CELL TRINITY: Type: IMPLANTABLE DEVICE | Site: SPINE LUMBAR | Status: FUNCTIONAL

## 2021-02-25 RX ORDER — DIPHENHYDRAMINE HCL 25 MG
25 CAPSULE ORAL
Status: DISCONTINUED | OUTPATIENT
Start: 2021-02-25 | End: 2021-03-02 | Stop reason: HOSPADM

## 2021-02-25 RX ORDER — TRAMADOL HYDROCHLORIDE 50 MG/1
50 TABLET ORAL
Status: DISCONTINUED | OUTPATIENT
Start: 2021-02-25 | End: 2021-03-02 | Stop reason: HOSPADM

## 2021-02-25 RX ORDER — KETAMINE HYDROCHLORIDE 10 MG/ML
INJECTION, SOLUTION INTRAMUSCULAR; INTRAVENOUS AS NEEDED
Status: DISCONTINUED | OUTPATIENT
Start: 2021-02-25 | End: 2021-02-25 | Stop reason: HOSPADM

## 2021-02-25 RX ORDER — DOCUSATE SODIUM 100 MG/1
100 CAPSULE, LIQUID FILLED ORAL 2 TIMES DAILY
Status: DISCONTINUED | OUTPATIENT
Start: 2021-02-25 | End: 2021-03-02 | Stop reason: HOSPADM

## 2021-02-25 RX ORDER — SODIUM CHLORIDE 0.9 % (FLUSH) 0.9 %
5-40 SYRINGE (ML) INJECTION EVERY 8 HOURS
Status: DISCONTINUED | OUTPATIENT
Start: 2021-02-25 | End: 2021-02-25 | Stop reason: HOSPADM

## 2021-02-25 RX ORDER — SODIUM CHLORIDE 9 MG/ML
50 INJECTION, SOLUTION INTRAVENOUS CONTINUOUS
Status: DISCONTINUED | OUTPATIENT
Start: 2021-02-25 | End: 2021-02-25 | Stop reason: HOSPADM

## 2021-02-25 RX ORDER — ROCURONIUM BROMIDE 10 MG/ML
INJECTION, SOLUTION INTRAVENOUS AS NEEDED
Status: DISCONTINUED | OUTPATIENT
Start: 2021-02-25 | End: 2021-02-25 | Stop reason: HOSPADM

## 2021-02-25 RX ORDER — SODIUM CHLORIDE, SODIUM LACTATE, POTASSIUM CHLORIDE, CALCIUM CHLORIDE 600; 310; 30; 20 MG/100ML; MG/100ML; MG/100ML; MG/100ML
125 INJECTION, SOLUTION INTRAVENOUS CONTINUOUS
Status: DISCONTINUED | OUTPATIENT
Start: 2021-02-25 | End: 2021-02-25 | Stop reason: HOSPADM

## 2021-02-25 RX ORDER — SODIUM CHLORIDE 9 MG/ML
1000 INJECTION, SOLUTION INTRAVENOUS CONTINUOUS
Status: DISCONTINUED | OUTPATIENT
Start: 2021-02-25 | End: 2021-02-25 | Stop reason: HOSPADM

## 2021-02-25 RX ORDER — SODIUM CHLORIDE 0.9 % (FLUSH) 0.9 %
5-40 SYRINGE (ML) INJECTION AS NEEDED
Status: DISCONTINUED | OUTPATIENT
Start: 2021-02-25 | End: 2021-02-25 | Stop reason: HOSPADM

## 2021-02-25 RX ORDER — MIDAZOLAM HYDROCHLORIDE 1 MG/ML
0.5 INJECTION, SOLUTION INTRAMUSCULAR; INTRAVENOUS
Status: DISCONTINUED | OUTPATIENT
Start: 2021-02-25 | End: 2021-02-25 | Stop reason: HOSPADM

## 2021-02-25 RX ORDER — MORPHINE SULFATE IN 0.9 % NACL 150MG/30ML
PATIENT CONTROLLED ANALGESIA SYRINGE INTRAVENOUS
Status: DISCONTINUED | OUTPATIENT
Start: 2021-02-25 | End: 2021-02-26

## 2021-02-25 RX ORDER — DIPHENHYDRAMINE HYDROCHLORIDE 50 MG/ML
12.5 INJECTION, SOLUTION INTRAMUSCULAR; INTRAVENOUS AS NEEDED
Status: DISCONTINUED | OUTPATIENT
Start: 2021-02-25 | End: 2021-02-25 | Stop reason: HOSPADM

## 2021-02-25 RX ORDER — GABAPENTIN 300 MG/1
300 CAPSULE ORAL 2 TIMES DAILY
Status: DISCONTINUED | OUTPATIENT
Start: 2021-02-25 | End: 2021-03-02 | Stop reason: HOSPADM

## 2021-02-25 RX ORDER — OXYCODONE AND ACETAMINOPHEN 5; 325 MG/1; MG/1
1 TABLET ORAL AS NEEDED
Status: DISCONTINUED | OUTPATIENT
Start: 2021-02-25 | End: 2021-02-25 | Stop reason: HOSPADM

## 2021-02-25 RX ORDER — HYDROMORPHONE HYDROCHLORIDE 2 MG/ML
INJECTION, SOLUTION INTRAMUSCULAR; INTRAVENOUS; SUBCUTANEOUS AS NEEDED
Status: DISCONTINUED | OUTPATIENT
Start: 2021-02-25 | End: 2021-02-25 | Stop reason: HOSPADM

## 2021-02-25 RX ORDER — HYDROMORPHONE HYDROCHLORIDE 1 MG/ML
1 INJECTION, SOLUTION INTRAMUSCULAR; INTRAVENOUS; SUBCUTANEOUS
Status: DISPENSED | OUTPATIENT
Start: 2021-02-25 | End: 2021-02-26

## 2021-02-25 RX ORDER — MELATONIN
2000 DAILY
Status: DISCONTINUED | OUTPATIENT
Start: 2021-02-26 | End: 2021-03-02 | Stop reason: HOSPADM

## 2021-02-25 RX ORDER — MIDAZOLAM HYDROCHLORIDE 1 MG/ML
1 INJECTION, SOLUTION INTRAMUSCULAR; INTRAVENOUS AS NEEDED
Status: DISCONTINUED | OUTPATIENT
Start: 2021-02-25 | End: 2021-02-25 | Stop reason: HOSPADM

## 2021-02-25 RX ORDER — NALOXONE HYDROCHLORIDE 0.4 MG/ML
0.4 INJECTION, SOLUTION INTRAMUSCULAR; INTRAVENOUS; SUBCUTANEOUS AS NEEDED
Status: DISCONTINUED | OUTPATIENT
Start: 2021-02-25 | End: 2021-03-02 | Stop reason: HOSPADM

## 2021-02-25 RX ORDER — ACETAMINOPHEN 500 MG
1000 TABLET ORAL EVERY 6 HOURS
Status: DISCONTINUED | OUTPATIENT
Start: 2021-02-25 | End: 2021-03-02 | Stop reason: HOSPADM

## 2021-02-25 RX ORDER — OXYCODONE HYDROCHLORIDE 5 MG/1
5 TABLET ORAL
Status: DISCONTINUED | OUTPATIENT
Start: 2021-02-25 | End: 2021-03-02 | Stop reason: HOSPADM

## 2021-02-25 RX ORDER — MORPHINE SULFATE 10 MG/ML
2 INJECTION, SOLUTION INTRAMUSCULAR; INTRAVENOUS
Status: DISCONTINUED | OUTPATIENT
Start: 2021-02-25 | End: 2021-02-25 | Stop reason: HOSPADM

## 2021-02-25 RX ORDER — NEOSTIGMINE METHYLSULFATE 1 MG/ML
INJECTION, SOLUTION INTRAVENOUS AS NEEDED
Status: DISCONTINUED | OUTPATIENT
Start: 2021-02-25 | End: 2021-02-25 | Stop reason: HOSPADM

## 2021-02-25 RX ORDER — GLYCOPYRROLATE 0.2 MG/ML
INJECTION INTRAMUSCULAR; INTRAVENOUS AS NEEDED
Status: DISCONTINUED | OUTPATIENT
Start: 2021-02-25 | End: 2021-02-25 | Stop reason: HOSPADM

## 2021-02-25 RX ORDER — EPHEDRINE SULFATE/0.9% NACL/PF 50 MG/5 ML
5 SYRINGE (ML) INTRAVENOUS AS NEEDED
Status: DISCONTINUED | OUTPATIENT
Start: 2021-02-25 | End: 2021-02-25 | Stop reason: HOSPADM

## 2021-02-25 RX ORDER — ALBUMIN HUMAN 50 G/1000ML
SOLUTION INTRAVENOUS AS NEEDED
Status: DISCONTINUED | OUTPATIENT
Start: 2021-02-25 | End: 2021-02-25 | Stop reason: HOSPADM

## 2021-02-25 RX ORDER — OXYCODONE HYDROCHLORIDE 5 MG/1
10 TABLET ORAL
Status: DISCONTINUED | OUTPATIENT
Start: 2021-02-25 | End: 2021-02-27

## 2021-02-25 RX ORDER — DEXAMETHASONE SODIUM PHOSPHATE 4 MG/ML
INJECTION, SOLUTION INTRA-ARTICULAR; INTRALESIONAL; INTRAMUSCULAR; INTRAVENOUS; SOFT TISSUE AS NEEDED
Status: DISCONTINUED | OUTPATIENT
Start: 2021-02-25 | End: 2021-02-25 | Stop reason: HOSPADM

## 2021-02-25 RX ORDER — ONDANSETRON 2 MG/ML
4 INJECTION INTRAMUSCULAR; INTRAVENOUS AS NEEDED
Status: DISCONTINUED | OUTPATIENT
Start: 2021-02-25 | End: 2021-02-25 | Stop reason: HOSPADM

## 2021-02-25 RX ORDER — HYDROMORPHONE HYDROCHLORIDE 1 MG/ML
0.2 INJECTION, SOLUTION INTRAMUSCULAR; INTRAVENOUS; SUBCUTANEOUS
Status: DISCONTINUED | OUTPATIENT
Start: 2021-02-25 | End: 2021-02-25 | Stop reason: HOSPADM

## 2021-02-25 RX ORDER — DIAZEPAM 5 MG/1
5 TABLET ORAL
Status: DISCONTINUED | OUTPATIENT
Start: 2021-02-25 | End: 2021-03-02 | Stop reason: HOSPADM

## 2021-02-25 RX ORDER — FENTANYL CITRATE 50 UG/ML
50 INJECTION, SOLUTION INTRAMUSCULAR; INTRAVENOUS AS NEEDED
Status: DISCONTINUED | OUTPATIENT
Start: 2021-02-25 | End: 2021-02-25 | Stop reason: HOSPADM

## 2021-02-25 RX ORDER — ONDANSETRON 2 MG/ML
INJECTION INTRAMUSCULAR; INTRAVENOUS AS NEEDED
Status: DISCONTINUED | OUTPATIENT
Start: 2021-02-25 | End: 2021-02-25 | Stop reason: HOSPADM

## 2021-02-25 RX ORDER — SODIUM CHLORIDE 9 MG/ML
125 INJECTION, SOLUTION INTRAVENOUS CONTINUOUS
Status: DISPENSED | OUTPATIENT
Start: 2021-02-25 | End: 2021-02-26

## 2021-02-25 RX ORDER — SODIUM CHLORIDE 0.9 % (FLUSH) 0.9 %
5-40 SYRINGE (ML) INJECTION AS NEEDED
Status: DISCONTINUED | OUTPATIENT
Start: 2021-02-25 | End: 2021-03-02 | Stop reason: HOSPADM

## 2021-02-25 RX ORDER — FENTANYL CITRATE 50 UG/ML
INJECTION, SOLUTION INTRAMUSCULAR; INTRAVENOUS AS NEEDED
Status: DISCONTINUED | OUTPATIENT
Start: 2021-02-25 | End: 2021-02-25 | Stop reason: HOSPADM

## 2021-02-25 RX ORDER — SUCCINYLCHOLINE CHLORIDE 20 MG/ML
INJECTION INTRAMUSCULAR; INTRAVENOUS AS NEEDED
Status: DISCONTINUED | OUTPATIENT
Start: 2021-02-25 | End: 2021-02-25 | Stop reason: HOSPADM

## 2021-02-25 RX ORDER — MIDAZOLAM HYDROCHLORIDE 1 MG/ML
INJECTION, SOLUTION INTRAMUSCULAR; INTRAVENOUS AS NEEDED
Status: DISCONTINUED | OUTPATIENT
Start: 2021-02-25 | End: 2021-02-25 | Stop reason: HOSPADM

## 2021-02-25 RX ORDER — ONDANSETRON 2 MG/ML
4 INJECTION INTRAMUSCULAR; INTRAVENOUS
Status: ACTIVE | OUTPATIENT
Start: 2021-02-25 | End: 2021-02-26

## 2021-02-25 RX ORDER — METHOCARBAMOL 750 MG/1
750 TABLET, FILM COATED ORAL 4 TIMES DAILY
Status: DISCONTINUED | OUTPATIENT
Start: 2021-02-25 | End: 2021-02-27

## 2021-02-25 RX ORDER — LIDOCAINE HYDROCHLORIDE 20 MG/ML
INJECTION, SOLUTION EPIDURAL; INFILTRATION; INTRACAUDAL; PERINEURAL AS NEEDED
Status: DISCONTINUED | OUTPATIENT
Start: 2021-02-25 | End: 2021-02-25 | Stop reason: HOSPADM

## 2021-02-25 RX ORDER — PROPOFOL 10 MG/ML
INJECTION, EMULSION INTRAVENOUS AS NEEDED
Status: DISCONTINUED | OUTPATIENT
Start: 2021-02-25 | End: 2021-02-25 | Stop reason: HOSPADM

## 2021-02-25 RX ORDER — LIDOCAINE HYDROCHLORIDE 10 MG/ML
0.1 INJECTION, SOLUTION EPIDURAL; INFILTRATION; INTRACAUDAL; PERINEURAL AS NEEDED
Status: DISCONTINUED | OUTPATIENT
Start: 2021-02-25 | End: 2021-02-25 | Stop reason: HOSPADM

## 2021-02-25 RX ORDER — SODIUM CHLORIDE 0.9 % (FLUSH) 0.9 %
5-40 SYRINGE (ML) INJECTION EVERY 8 HOURS
Status: DISCONTINUED | OUTPATIENT
Start: 2021-02-25 | End: 2021-03-02 | Stop reason: HOSPADM

## 2021-02-25 RX ORDER — FENTANYL CITRATE 50 UG/ML
25 INJECTION, SOLUTION INTRAMUSCULAR; INTRAVENOUS
Status: DISCONTINUED | OUTPATIENT
Start: 2021-02-25 | End: 2021-02-25 | Stop reason: HOSPADM

## 2021-02-25 RX ORDER — ACETAMINOPHEN 325 MG/1
650 TABLET ORAL ONCE
Status: COMPLETED | OUTPATIENT
Start: 2021-02-25 | End: 2021-02-25

## 2021-02-25 RX ADMIN — MIDAZOLAM 4 MG: 1 INJECTION INTRAMUSCULAR; INTRAVENOUS at 09:36

## 2021-02-25 RX ADMIN — KETAMINE HYDROCHLORIDE 20 MG: 10 INJECTION, SOLUTION INTRAMUSCULAR; INTRAVENOUS at 12:59

## 2021-02-25 RX ADMIN — FENTANYL CITRATE 50 MCG: 50 INJECTION, SOLUTION INTRAMUSCULAR; INTRAVENOUS at 09:46

## 2021-02-25 RX ADMIN — METHOCARBAMOL TABLETS 750 MG: 750 TABLET, COATED ORAL at 17:39

## 2021-02-25 RX ADMIN — METHOCARBAMOL TABLETS 750 MG: 750 TABLET, COATED ORAL at 21:04

## 2021-02-25 RX ADMIN — ALBUMIN (HUMAN) 250 ML: 12.5 INJECTION, SOLUTION INTRAVENOUS at 12:42

## 2021-02-25 RX ADMIN — ROCURONIUM BROMIDE 5 MG: 10 SOLUTION INTRAVENOUS at 09:46

## 2021-02-25 RX ADMIN — ACETAMINOPHEN 650 MG: 325 TABLET ORAL at 08:24

## 2021-02-25 RX ADMIN — FENTANYL CITRATE 50 MCG: 50 INJECTION, SOLUTION INTRAMUSCULAR; INTRAVENOUS at 10:15

## 2021-02-25 RX ADMIN — HYDROMORPHONE HYDROCHLORIDE 0.5 MG: 2 INJECTION, SOLUTION INTRAMUSCULAR; INTRAVENOUS; SUBCUTANEOUS at 12:37

## 2021-02-25 RX ADMIN — HYDROMORPHONE HYDROCHLORIDE 0.5 MG: 2 INJECTION, SOLUTION INTRAMUSCULAR; INTRAVENOUS; SUBCUTANEOUS at 10:08

## 2021-02-25 RX ADMIN — ROCURONIUM BROMIDE 20 MG: 10 SOLUTION INTRAVENOUS at 11:06

## 2021-02-25 RX ADMIN — CEFAZOLIN 3 G: 1 INJECTION, POWDER, FOR SOLUTION INTRAMUSCULAR; INTRAVENOUS; PARENTERAL at 10:00

## 2021-02-25 RX ADMIN — DOCUSATE SODIUM 100 MG: 100 CAPSULE, LIQUID FILLED ORAL at 17:39

## 2021-02-25 RX ADMIN — HYDROMORPHONE HYDROCHLORIDE 0.5 MG: 2 INJECTION, SOLUTION INTRAMUSCULAR; INTRAVENOUS; SUBCUTANEOUS at 09:36

## 2021-02-25 RX ADMIN — Medication: at 15:14

## 2021-02-25 RX ADMIN — DEXMEDETOMIDINE HYDROCHLORIDE 8 MCG: 100 INJECTION, SOLUTION, CONCENTRATE INTRAVENOUS at 12:59

## 2021-02-25 RX ADMIN — GABAPENTIN 300 MG: 300 CAPSULE ORAL at 21:04

## 2021-02-25 RX ADMIN — PHENYLEPHRINE HYDROCHLORIDE 20 MCG/MIN: 10 INJECTION INTRAVENOUS at 10:25

## 2021-02-25 RX ADMIN — ROCURONIUM BROMIDE 45 MG: 10 SOLUTION INTRAVENOUS at 09:55

## 2021-02-25 RX ADMIN — DEXMEDETOMIDINE HYDROCHLORIDE 8 MCG: 100 INJECTION, SOLUTION, CONCENTRATE INTRAVENOUS at 12:57

## 2021-02-25 RX ADMIN — HYDROMORPHONE HYDROCHLORIDE 1 MG: 2 INJECTION, SOLUTION INTRAMUSCULAR; INTRAVENOUS; SUBCUTANEOUS at 13:05

## 2021-02-25 RX ADMIN — SODIUM CHLORIDE, POTASSIUM CHLORIDE, SODIUM LACTATE AND CALCIUM CHLORIDE: 600; 310; 30; 20 INJECTION, SOLUTION INTRAVENOUS at 11:57

## 2021-02-25 RX ADMIN — KETAMINE HYDROCHLORIDE 30 MG: 10 INJECTION, SOLUTION INTRAMUSCULAR; INTRAVENOUS at 09:55

## 2021-02-25 RX ADMIN — ROCURONIUM BROMIDE 30 MG: 10 SOLUTION INTRAVENOUS at 10:12

## 2021-02-25 RX ADMIN — ROCURONIUM BROMIDE 10 MG: 10 SOLUTION INTRAVENOUS at 12:37

## 2021-02-25 RX ADMIN — ONDANSETRON HYDROCHLORIDE 4 MG: 2 INJECTION, SOLUTION INTRAMUSCULAR; INTRAVENOUS at 13:07

## 2021-02-25 RX ADMIN — LIDOCAINE HYDROCHLORIDE 100 MG: 20 INJECTION, SOLUTION EPIDURAL; INFILTRATION; INTRACAUDAL; PERINEURAL at 09:46

## 2021-02-25 RX ADMIN — SODIUM CHLORIDE, POTASSIUM CHLORIDE, SODIUM LACTATE AND CALCIUM CHLORIDE 125 ML/HR: 600; 310; 30; 20 INJECTION, SOLUTION INTRAVENOUS at 08:46

## 2021-02-25 RX ADMIN — DEXMEDETOMIDINE HYDROCHLORIDE 12 MCG: 100 INJECTION, SOLUTION, CONCENTRATE INTRAVENOUS at 10:05

## 2021-02-25 RX ADMIN — SUCCINYLCHOLINE CHLORIDE 180 MG: 20 INJECTION, SOLUTION INTRAMUSCULAR; INTRAVENOUS at 09:46

## 2021-02-25 RX ADMIN — DEXAMETHASONE SODIUM PHOSPHATE 4 MG: 4 INJECTION, SOLUTION INTRAMUSCULAR; INTRAVENOUS at 10:06

## 2021-02-25 RX ADMIN — ROCURONIUM BROMIDE 20 MG: 10 SOLUTION INTRAVENOUS at 10:38

## 2021-02-25 RX ADMIN — SODIUM CHLORIDE 125 ML/HR: 9 INJECTION, SOLUTION INTRAVENOUS at 23:53

## 2021-02-25 RX ADMIN — Medication 4 MG: at 13:07

## 2021-02-25 RX ADMIN — GLYCOPYRROLATE 0.6 MG: 0.2 INJECTION, SOLUTION INTRAMUSCULAR; INTRAVENOUS at 13:07

## 2021-02-25 RX ADMIN — HYDROMORPHONE HYDROCHLORIDE 0.5 MG: 2 INJECTION, SOLUTION INTRAMUSCULAR; INTRAVENOUS; SUBCUTANEOUS at 12:54

## 2021-02-25 RX ADMIN — PROPOFOL 250 MG: 10 INJECTION, EMULSION INTRAVENOUS at 09:46

## 2021-02-25 RX ADMIN — ROCURONIUM BROMIDE 10 MG: 10 SOLUTION INTRAVENOUS at 12:10

## 2021-02-25 RX ADMIN — ACETAMINOPHEN 1000 MG: 500 TABLET ORAL at 17:39

## 2021-02-25 RX ADMIN — HYDROMORPHONE HYDROCHLORIDE 0.5 MG: 2 INJECTION, SOLUTION INTRAMUSCULAR; INTRAVENOUS; SUBCUTANEOUS at 13:44

## 2021-02-25 RX ADMIN — HYDROMORPHONE HYDROCHLORIDE 0.5 MG: 2 INJECTION, SOLUTION INTRAMUSCULAR; INTRAVENOUS; SUBCUTANEOUS at 12:30

## 2021-02-25 RX ADMIN — SODIUM CHLORIDE 125 ML/HR: 9 INJECTION, SOLUTION INTRAVENOUS at 15:15

## 2021-02-25 RX ADMIN — CEFAZOLIN 3 G: 10 INJECTION, POWDER, FOR SOLUTION INTRAVENOUS at 17:43

## 2021-02-25 NOTE — PROGRESS NOTES
Ortho/NeuroSurgery PA Note    POD# 0  s/p L4-5  LAMINECTOMY WITH LEFT L4-5 FACIECTOMY AND INSTRUMENTATED FUSION ELEVATE POSTERIOR LUMBAR INTERBODY FUSION PEDICLE SCREWS AND ALLOGRAFT(O-ARM)     Pt resting in bed. No complaints. VSS Afebrile. Patient has not had something to eat/drink yet. No nausea. Pt lives with his wife who will be assisting pt after surgery. He has a lumbar brace. Most Recent Labs:   Lab Results   Component Value Date/Time    HGB 15.1 02/19/2021 03:38 PM    Hemoglobin A1c 5.1 10/07/2019 10:33 AM       Body mass index is 46.26 kg/m². Reference: BMI greater than 30 is classified as obesity and greater than 40 is classified as morbid obesity. Voiding status: rand  Dressing C/D/I    Calves soft and supple;  No pain with passive stretch  Sensation and motor intact    Plan:  1) s/p L4-5  LAMINECTOMY WITH LEFT L4-5 FACIECTOMY AND POSTERIOR LUMBAR INTERBODY FUSION   -Pain control   -Oneyda-op Antibiotics Ancef  -PT/OT  -SCDs for mechanical DVT prophylaxis  -Advance diet as tolerated    Discharge plans to home pending    FARHAT Ogden

## 2021-02-25 NOTE — PROGRESS NOTES
Primary Nurse Jane Ronquillo and Pilar Painting RN performed a dual skin assessment on this patient No impairment noted  Brandon score is 19

## 2021-02-25 NOTE — PROGRESS NOTES
TRANSFER - IN REPORT:    Verbal report received from Armando(name) on [place]  being received from PACU(unit) for routine progression of care      Report consisted of patients Situation, Background, Assessment and   Recommendations(SBAR). Information from the following report(s) SBAR was reviewed with the receiving nurse. Opportunity for questions and clarification was provided. Assessment completed upon patients arrival to unit and care assumed.

## 2021-02-25 NOTE — BRIEF OP NOTE
Brief Postoperative Note    Patient: Gagandeep Caldwell  YOB: 1975  MRN: [id number]    Date of Procedure: 2/25/2021     Pre-Op Diagnosis: LUMBAR STENOSIS AND INSTABILITY    Post-Op Diagnosis: Same as preoperative diagnosis. Procedure(s):  L4-5  LAMINECTOMY WITH LEFT L4-5 FACIECTOMY AND INSTRUMENTATED FUSION ELEVATE POSTERIOR LUMBAR INTERBODY FUSION PEDICLE SCREWS AND ALLOGRAFT(O-ARM)    Surgeon(s):  Ruthie Fraga MD    Surgical Assistant: Surg Asst-1: Alaina Pandey    Anesthesia: General     Estimated Blood Loss (mL): 167     Complications: None    Specimens: * No specimens in log *     Implants:   Implant Name Type Inv. Item Serial No.  Lot No. LRB No. Used Action   GRAFT BNE SUB L CANC FRZN MORSELIZED W/ VIABLE CELL FELIZ - [id number]  GRAFT BNE SUB L CANC FRZN MORSELIZED W/ VIABLE CELL FELIZ [id number] [employer] N/A N/A 1 Implanted   TRIAL SPNL [id number] STD INTERVERTEBRAL FUS DEV BNE GRFT LUM - SN/A  TRIAL SPNL [id number] STD INTERVERTEBRAL FUS DEV BNE GRFT LUM N/A [employer]WD [id number] N/A 1 Implanted   SET SCR SPNL L6MM DIA5. 5MM TI BRK OFF NANCY W/ DETACH [address] - SNA  SET SCR SPNL L6MM DIA5. 5MM TI BRK OFF NANCY W/ DETACH [address] NA [employer]WD NA N/A 4 Implanted   SCREW SPNL L50MM DIA6. 5MM POST THORACOLUMBOSACRAL CO CHROM - SNA  SCREW SPNL L50MM DIA6. 5MM POST THORACOLUMBOSACRAL CO CHROM NA [employer]WD NA N/A 2 Implanted   SCREW SPNL L45MM DIA6. 5MM POST THORACOLUMBOSACRAL CO CHROM - SNA  SCREW SPNL L45MM DIA6. 5MM POST THORACOLUMBOSACRAL CO CHROM NA [employer]WD NA N/A 2 Implanted   TAMMIE SPNL L35MM DIA5. 5MM ANT POST THORACOLUMBOSACRAL TI - SN/A  TAMMIE SPNL L35MM DIA5. 5MM ANT POST THORACOLUMBOSACRAL TI N/A [employer] N/A N/A 2 Implanted       Drains:   Hemovac Left Back (Active)       Findings: stenosis.  scoliosis    Electronically Signed by Kasey Anguiano MD on 2/25/2021 at 12:57 PM

## 2021-02-25 NOTE — PERIOP NOTES
TRANSFER - OUT REPORT:    Verbal report given to SHAINA PITTS RN on Roderick Strauss  being transferred to room 532 for routine post - op       Report consisted of patients Situation, Background, Assessment and   Recommendations(SBAR). Time Pre op antibiotic given:  1000  Anesthesia Stop time:  0670  Johnson Present on Transfer to floor: YES  Order for Johnson on Chart: YES,    Information from the following report(s) SBAR and MAR was reviewed with the receiving nurse. Opportunity for questions and clarification was provided. Is the patient on 02? YES       L/Min 3       Other     Is the patient on a monitor? NO    Is the nurse transporting with the patient? NO    Surgical Waiting Area notified of patient's transfer from PACU? YES    Lines:   Peripheral IV 02/25/21 Left;Posterior Hand (Active)   Site Assessment Clean, dry, & intact 02/25/21 1355   Phlebitis Assessment 0 02/25/21 1355   Infiltration Assessment 0 02/25/21 1355   Dressing Status Clean, dry, & intact 02/25/21 1355   Dressing Type Transparent 02/25/21 1355   Hub Color/Line Status Infusing 02/25/21 1355        The following personal items collected during your admission accompanied patient upon transfer:   Dental Appliance: Dental Appliances: None  Vision:    Hearing Aid:    Jewelry:    Clothing: Clothing: (clothes bag to pacu)  Other Valuables:  Other Valuables: Eyeglasses  Valuables sent to safe:

## 2021-02-25 NOTE — ANESTHESIA POSTPROCEDURE EVALUATION
Post-Anesthesia Evaluation and Assessment    Patient: Morena Baez MRN: [id number]  SSN: [id number]    YOB: 1975  Age: 50 y.o. Sex: male      I have evaluated the patient and they are stable and ready for discharge from the PACU. Cardiovascular Function/Vital Signs  Visit Vitals  /79   Pulse 100   Temp 36.4 °C (97.6 °F)   Resp 13   Wt 130 kg (286 lb 9.6 oz)   SpO2 98%   BMI 46.26 kg/m²       Patient is status post General anesthesia for Procedure(s):  L4-5  LAMINECTOMY WITH LEFT L4-5 FACIECTOMY AND INSTRUMENTATED FUSION ELEVATE POSTERIOR LUMBAR INTERBODY FUSION PEDICLE SCREWS AND ALLOGRAFT(O-ARM). Nausea/Vomiting: None    Postoperative hydration reviewed and adequate. Pain:  Pain Scale 1: Numeric (0 - 10) (02/25/21 1441)  Pain Intensity 1: 0 (02/25/21 1441)   Managed    Neurological Status:   Neuro (WDL): Within Defined Limits (02/25/21 1500)  Neuro  Neurologic State: Drowsy (02/25/21 1500)  LUE Motor Response: Purposeful (02/25/21 1441)  LLE Motor Response: Purposeful (02/25/21 1441)  RUE Motor Response: Purposeful (02/25/21 1441)  RLE Motor Response: Purposeful (02/25/21 1441)   At baseline    Mental Status, Level of Consciousness: Alert and  oriented to person, place, and time    Pulmonary Status:   O2 Device: Nasal cannula (02/25/21 1358)   Adequate oxygenation and airway patent    Complications related to anesthesia: None    Post-anesthesia assessment completed. No concerns    Signed By: Selwyn Brandon MD     February 25, 2021              Procedure(s):  L4-5  LAMINECTOMY WITH LEFT L4-5 FACIECTOMY AND INSTRUMENTATED FUSION ELEVATE POSTERIOR LUMBAR INTERBODY FUSION PEDICLE SCREWS AND ALLOGRAFT(O-ARM). general    <BSHSIANPOST>    INITIAL Post-op Vital signs:   Vitals Value Taken Time   /79 02/25/21 1515   Temp 36.4 °C (97.6 °F) 02/25/21 1358   Pulse 97 02/25/21 1519   Resp 11 02/25/21 1519   SpO2 95 % 02/25/21 1519   Vitals shown include unvalidated device data.

## 2021-02-26 LAB — HGB BLD-MCNC: 12.4 G/DL (ref 12.1–17)

## 2021-02-26 PROCEDURE — 36415 COLL VENOUS BLD VENIPUNCTURE: CPT

## 2021-02-26 PROCEDURE — 74011250636 HC RX REV CODE- 250/636: Performed by: NEUROLOGICAL SURGERY

## 2021-02-26 PROCEDURE — 85018 HEMOGLOBIN: CPT

## 2021-02-26 PROCEDURE — 74011250637 HC RX REV CODE- 250/637: Performed by: NEUROLOGICAL SURGERY

## 2021-02-26 PROCEDURE — 94760 N-INVAS EAR/PLS OXIMETRY 1: CPT

## 2021-02-26 PROCEDURE — 77010033678 HC OXYGEN DAILY

## 2021-02-26 PROCEDURE — 97165 OT EVAL LOW COMPLEX 30 MIN: CPT

## 2021-02-26 PROCEDURE — 97161 PT EVAL LOW COMPLEX 20 MIN: CPT

## 2021-02-26 PROCEDURE — 94762 N-INVAS EAR/PLS OXIMTRY CONT: CPT

## 2021-02-26 PROCEDURE — 65270000029 HC RM PRIVATE

## 2021-02-26 PROCEDURE — 97530 THERAPEUTIC ACTIVITIES: CPT

## 2021-02-26 PROCEDURE — 74011000258 HC RX REV CODE- 258: Performed by: NEUROLOGICAL SURGERY

## 2021-02-26 PROCEDURE — 97116 GAIT TRAINING THERAPY: CPT

## 2021-02-26 PROCEDURE — 97535 SELF CARE MNGMENT TRAINING: CPT

## 2021-02-26 PROCEDURE — 74011250637 HC RX REV CODE- 250/637: Performed by: PHYSICIAN ASSISTANT

## 2021-02-26 RX ORDER — LANOLIN ALCOHOL/MO/W.PET/CERES
1.5 CREAM (GRAM) TOPICAL
Status: DISCONTINUED | OUTPATIENT
Start: 2021-02-26 | End: 2021-03-02 | Stop reason: HOSPADM

## 2021-02-26 RX ORDER — DOCUSATE SODIUM 100 MG/1
100 CAPSULE, LIQUID FILLED ORAL 2 TIMES DAILY
Qty: 60 CAP | Refills: 2 | Status: SHIPPED | OUTPATIENT
Start: 2021-02-26 | End: 2021-05-27

## 2021-02-26 RX ORDER — OXYCODONE HYDROCHLORIDE 5 MG/1
5 TABLET ORAL
Qty: 50 TAB | Refills: 0 | Status: SHIPPED | OUTPATIENT
Start: 2021-02-26 | End: 2021-03-12

## 2021-02-26 RX ORDER — NALOXONE HYDROCHLORIDE 4 MG/.1ML
SPRAY NASAL
Qty: 1 EACH | Refills: 0 | Status: SHIPPED | OUTPATIENT
Start: 2021-02-26 | End: 2021-04-14

## 2021-02-26 RX ADMIN — DIAZEPAM 5 MG: 5 TABLET ORAL at 19:08

## 2021-02-26 RX ADMIN — METHOCARBAMOL TABLETS 750 MG: 750 TABLET, COATED ORAL at 08:38

## 2021-02-26 RX ADMIN — OXYCODONE 10 MG: 5 TABLET ORAL at 05:49

## 2021-02-26 RX ADMIN — Medication 10 ML: at 13:43

## 2021-02-26 RX ADMIN — OXYCODONE 10 MG: 5 TABLET ORAL at 08:38

## 2021-02-26 RX ADMIN — ACETAMINOPHEN 1000 MG: 500 TABLET ORAL at 11:49

## 2021-02-26 RX ADMIN — CEFAZOLIN 3 G: 10 INJECTION, POWDER, FOR SOLUTION INTRAVENOUS at 01:50

## 2021-02-26 RX ADMIN — DOCUSATE SODIUM 100 MG: 100 CAPSULE, LIQUID FILLED ORAL at 17:51

## 2021-02-26 RX ADMIN — OXYCODONE 10 MG: 5 TABLET ORAL at 11:49

## 2021-02-26 RX ADMIN — OXYCODONE 10 MG: 5 TABLET ORAL at 21:20

## 2021-02-26 RX ADMIN — CHOLECALCIFEROL (VITAMIN D3) 25 MCG (1,000 UNIT) TABLET 2000 UNITS: TABLET at 08:38

## 2021-02-26 RX ADMIN — Medication 1.5 MG: at 22:02

## 2021-02-26 RX ADMIN — ACETAMINOPHEN 1000 MG: 500 TABLET ORAL at 01:50

## 2021-02-26 RX ADMIN — ACETAMINOPHEN 1000 MG: 500 TABLET ORAL at 05:49

## 2021-02-26 RX ADMIN — GABAPENTIN 300 MG: 300 CAPSULE ORAL at 08:38

## 2021-02-26 RX ADMIN — ACETAMINOPHEN 1000 MG: 500 TABLET ORAL at 17:51

## 2021-02-26 RX ADMIN — METHOCARBAMOL TABLETS 750 MG: 750 TABLET, COATED ORAL at 22:02

## 2021-02-26 RX ADMIN — DOCUSATE SODIUM 100 MG: 100 CAPSULE, LIQUID FILLED ORAL at 08:39

## 2021-02-26 RX ADMIN — GABAPENTIN 300 MG: 300 CAPSULE ORAL at 21:20

## 2021-02-26 RX ADMIN — METHOCARBAMOL TABLETS 750 MG: 750 TABLET, COATED ORAL at 13:43

## 2021-02-26 RX ADMIN — OXYCODONE 10 MG: 5 TABLET ORAL at 17:51

## 2021-02-26 RX ADMIN — METHOCARBAMOL TABLETS 750 MG: 750 TABLET, COATED ORAL at 17:51

## 2021-02-26 RX ADMIN — HYDROMORPHONE HYDROCHLORIDE 1 MG: 1 INJECTION, SOLUTION INTRAMUSCULAR; INTRAVENOUS; SUBCUTANEOUS at 14:25

## 2021-02-26 NOTE — PROGRESS NOTES
Problem: Mobility Impaired (Adult and Pediatric)  Goal: *Acute Goals and Plan of Care (Insert Text)  Description: FUNCTIONAL STATUS PRIOR TO ADMISSION: Patient was independent and active without use of DME.    HOME SUPPORT PRIOR TO ADMISSION: The patient lived with spouse but did not require assist.    Physical Therapy Goals  Initiated 2/26/2021    1. Patient will move from supine to sit and sit to supine  and roll side to side in bed with independence within 4 days. 2. Patient will perform sit to stand with modified independence within 4 days. 3. Patient will ambulate with modified independence for 150 feet with the least restrictive device within 4 days. 4. Patient will ascend/descend full flight of stairs with 1 handrail(s) with modified independence within 4 days. 5. Patient will verbalize and demonstrate understanding of spinal precautions (No bending, lifting greater than 5 lbs, or twisting; log-roll technique; frequent repositioning as instructed) within 4 days. 2/26/2021 1612 by Leticia Pelaez  Outcome: Progressing Towards Goal   PHYSICAL THERAPY TREATMENT  Patient: Jennifer Smiley (68 y.o. male)  Date: 2/26/2021  Diagnosis: Lumbar spine instability [M53.2X6] <principal problem not specified>  Procedure(s) (LRB):  L4-5  LAMINECTOMY WITH LEFT L4-5 FACIECTOMY AND INSTRUMENTATED FUSION ELEVATE POSTERIOR LUMBAR INTERBODY FUSION PEDICLE SCREWS AND ALLOGRAFT(O-ARM) (N/A) 1 Day Post-Op  Precautions: Back, Fall No bending, no lifting greater than 5 lbs, no twisting, log-roll technique, repositioning every 20-30 min except when sleeping, brace when OOB (if ordered)  Chart, physical therapy assessment, plan of care and goals were reviewed. ASSESSMENT  Patient continues with skilled PT services and is slowly progressing towards goals. Attempted to see pt this afternoon however pt c/o pain and requesting pain medication. Returned later to see pt after he was medicated and agreeable at this time.  Pt transferred to EOB reporting less pain than earlier today. Pt was attempting to don his brace in sitting when he suddenly felt a \"pop\" in his low back. Pt highly anxious and distressed though not reporting increased back pain. Therapist donned brace with total assistance with pt sitting EOB. Pt stood and ambulated with rolling walker for a good distance while breathing heavily, rigid,and tense due to anxiety. He reported numbness of R foot initially and following gait some numbness in BLE though stating it is less than he experienced prior to surgery. Pt returned to room and agreeable to remain in chair. Notified RN and PA of pt reporting a \"pop\". Current Level of Function Impacting Discharge (mobility/balance): bed mobility minimal assist x 2, sit to stand with minimal assist, ambulation with rolling walker x 160 feet with CGA    Other factors to consider for discharge: high pain levels limiting activity, fall risk, below baseline level             PLAN :  Patient continues to benefit from skilled intervention to address the above impairments. Continue treatment per established plan of care. to address goals. Recommendation for discharge: (in order for the patient to meet his/her long term goals)  Physical therapy at least 2 days/week in the home     This discharge recommendation:  Has not yet been discussed the attending provider and/or case management    IF patient discharges home will need the following DME: rolling walker and reacher       SUBJECTIVE:   Patient stated Something just popped in my back.     OBJECTIVE DATA SUMMARY:   Critical Behavior:  Neurologic State: Alert  Orientation Level: Oriented X4  Cognition: Appropriate decision making  Safety/Judgement: Awareness of environment    Spinal diagnosis intervention:  The patient stated 3/3 back precautions when prompted. Reviewed all 3 back precautions, log roll technique, and sitting for 30 minutes at a time.    Reviewed back brace application and wear schedule. Brace donned with total assistance. Functional Mobility Training:    Bed Mobility:  Log Rolling: Minimum assistance;Assist x2; Additional time; Adaptive equipment  Supine to Sit: Minimum assistance;Assist x2; Additional time; Adaptive equipment     Scooting: Supervision        Transfers:  Sit to Stand: Minimum assistance;Assist x1  Stand to Sit: Minimum assistance;Assist x1                          Balance:  Sitting: Intact  Standing: Impaired  Standing - Static: Constant support;Good  Standing - Dynamic : Constant support;Good  Ambulation/Gait Training:  Distance (ft): 160 Feet (ft)  Assistive Device: Brace/Splint;Gait belt;Walker, rolling  Ambulation - Level of Assistance: Contact guard assistance;Assist x2        Gait Abnormalities: Antalgic;Decreased step clearance        Base of Support: Widened     Speed/Maggie: Pace decreased (<100 feet/min); Delayed  Step Length: Left shortened;Right shortened                Pain Rating:  Verbal: 10  Location: back    Activity Tolerance:   Fair    After treatment patient left in no apparent distress:   Sitting in chair and Call bell within reach    COMMUNICATION/COLLABORATION:   The patients plan of care was discussed with: Registered nurse.      Bhakti Jesus Freeze   Time Calculation: 27 mins

## 2021-02-26 NOTE — PROGRESS NOTES
Problem: Falls - Risk of  Goal: *Absence of Falls  Description: Document Brianne Clint Fall Risk and appropriate interventions in the flowsheet.   Note: Fall Risk Interventions:  Mobility Interventions: Bed/chair exit alarm         Medication Interventions: Assess postural VS orthostatic hypotension, Patient to call before getting OOB, Teach patient to arise slowly, Utilize gait belt for transfers/ambulation    Elimination Interventions: Bed/chair exit alarm, Call light in reach, Patient to call for help with toileting needs

## 2021-02-26 NOTE — OP NOTES
[address]   OPERATIVE REPORT    Name:  Olivia Banks  MR#:  [id number]  :  1975  ACCOUNT #:  [de-identified]  DATE OF SERVICE:  2021      PREOPERATIVE DIAGNOSES:  L4-5 stenosis with spondylolisthesis, left foraminal and lateral recess stenosis. POSTOPERATIVE DIAGNOSES:  L4-5 stenosis with spondylolisthesis, left foraminal and lateral recess stenosis. PROCEDURES PERFORMED:  Complete L4-L5 laminectomy, left L4-5 facetectomy with instrumented fusion L4-5 using Medtronic posterior lumbar interbody fusion Elevate cage, posterolateral fusion with Medtronic Solera pedicle screws, Zoë allograft, use of intraoperative O-arm navigation. SURGEON:  Brigette Ding MD    ASSISTANT:  Edy Weaver SA    ANESTHESIA:  General endotracheal anesthesia. COMPLICATIONS:  None. SPECIMENS REMOVED:  None. IMPLANTS:  As noted above. ESTIMATED BLOOD LOSS:  250 mL. OPERATIVE INDICATIONS:  This is a 73-year-old gentleman with severe refractory left L5 radiculopathy. MRI revealed deformity at L4-5. He had a partially sacralized L5-S1. There is lateral listhesis, facet hypertrophy on the left and scoliotic deformity at this level. After discussing treatment options and risks of surgery, informed consent was obtained. OPERATION IN DETAIL:  The patient was taken to the operating room and placed under general endotracheal anesthesia. All necessary lines and monitors were placed. Given appropriate dose of IV antibiotics. SCDs and Johnson were placed. He was placed prone on the [facility] table. All pressure points were padded. The lumbar sacral region was prepped and draped in standard sterile fashion. Midline incision was made from L3 to L5 with a skin knife, carried down with Bovie electrocautery in the avascular midline plane. Subperiosteal dissection was performed on the lamina of L3, L4, and L5.   There was significant facet arthropathy, he clearly had partially sacralized L5 level but incomplete. Fluoroscopy localized our operative level. Dissection was carried out over the facets of L2-4 and L4-5, dissecting out the transverse processes of L4 and L5 bilaterally. Self-retaining retractors were placed. Rongeurs, curettes, and Kerrisons were used to perform laminectomy at L4-5. On the left-hand side, there was marked facet hypertrophy compressing the L5 nerve root and lateral thecal sac. The facet was drilled off almost completely down to its outer one-quarter and curettes and Kerrisons were used to widely decompress the thecal sac and skeletonize the L5 nerve root all the way up the foramen. This was widely decompressed. The L4 foramen was palpated to be free after this as well. I then performed a diskectomy on the left. We opened up the lateral recesses on the right-hand side. A diskectomy was performed on the left at L4-5. The disk space was cleaned out and the endplates were decorticated. I then placed an 8-mm x 28-mm Elevate expandable cage packed with Zoë allograft. Autograft and allograft were packed in front of the cage in the disk space. I placed the graft and distracted it to 12 mm. This created a good decompression of the L4 foramen as well as reducing the deformities. We used the Stealth O-arm navigation system and did a registration study. Under fluoroscopic guidance, we then cannulated the pedicles of L4 and L5 bilaterally. The posterolateral gutters were decorticated and packed with Zoë allograft and local autograft. I then placed Medtronic Solera pedicle screws 6.5 x 50 mm screws at L4 bilaterally and 6.5 x 45 mm screws at L5 bilaterally. I connected these with 35 mm titanium lordotic katia. O-arm showed adequate position of the hardware and grafts. The foramen was palpated to be free. The wound was copiously irrigated. The titanium rods were locked into place in the polyaxial heads. The wound was copiously irrigated.   A Hemovac drain was placed and brought out through a separate stab incision. Retractors were removed. The wound was then closed using 0 Vicryl to close the deep fascial and fat layer, and inverted 2-0 Vicryl in the deep dermal layer, running 4-0 Monocryl in a subcuticular fashion. Wounds were cleaned and dried and dressed with sterile dressing. The patient was then flipped over, extubated, taken to the recovery room in stable condition.         MD FARHAT [email or web address]  D:  02/26/2021 7:16  T:  02/26/2021 8:51  JOB #:  [id number]  CC:  Jose Del Cid MD

## 2021-02-26 NOTE — DISCHARGE INSTRUCTIONS
After Hospital Care Plan:  Discharge Instructions Lumbar Fusion Surgery   Dr. Cynthia Ojeda     Patient Name: Jacqueline Thomas    Date of procedure: 2/25/2021  Date of discharge: 2/26/2021    Procedure: Procedure(s):  L4-5  LAMINECTOMY WITH LEFT L4-5 FACIECTOMY AND INSTRUMENTATED FUSION ELEVATE POSTERIOR LUMBAR INTERBODY FUSION PEDICLE SCREWS AND ALLOGRAFT(O-ARM)  PCP: Andreia Barreto MD    Follow up appointments  -follow up with Dr. Cynthia Ojeda in 2 weeks. Call [phone] to make an appointment as soon as you get home from the hospital.    When to call your Spine Surgeon:  -Signs of infection-if your incision is red; continues to have drainage; drainage has a foul odor or if you have a persistent fever over 101 degrees for 24 hours  -Nausea or vomiting, severe headache  -Loss of bowel or bladder function, inability to urinate  -Changes in sensation in your arms or legs (numbness, tingling, loss of color)  -Increased weakness-greater than before your surgery  -Severe pain or pain not relieved by medications  -Signs of a blood clot in your leg-calf pain, tenderness, redness, swelling of lower leg    When to call your Primary Care Physician:  -Concerns about medical conditions such as diabetes, high blood pressure, asthma, congestive heart failure  -Call if blood sugars are elevated, persistent headache or dizziness, coughing or congestion, constipation or diarrhea, burning with urination, abnormal heart rate    When to call 911 and go to the nearest emergency room:  -Acute onset of chest pain, shortness of breath, difficulty breathing    Activity  -You are going home a well person, be as active as possible. Your only exercise should be walking. Start with short frequent walks and increase your walking distance each day.  -Limit the amount of time you sit to 20-30 minute intervals.   Sitting for prolonged periods of time will be uncomfortable for you following surgery.  -Do NOT lift anything over 5 pounds  -Do NOT do any straining, twisting or bending  -When you are in bed, you may lay on your back or on either side. Do NOT lie on your stomach    Brace  -If you have a back brace, you should wear your brace at all times when you are out of bed. Do not wear the brace while in bed or showering.  -Remember to always wear a cotton t-shirt underneath your brace.  -Do not bend or twist when your brace is off. Diet  -Resume usual diet; drink plenty of fluids; eat foods high in fiber  -It is important to have regular bowel movements. Pain medications may cause constipation. You may want to take a stool softener (such as Senokot-S or Colace) to prevent constipation.   -If constipation occurs, take a laxative (such as Dulcolax tablets, Milk of Magnesia, or a suppository). Laxatives should only be used if the above preventable measures have failed and you still have not had a bowel movement after three days    Driving  -You may not drive or return to work until instructed by your physician. However, you may ride in the car for short periods of time. Incision Care  -You may take brief showers but do not run the water run directly onto the wound. After showering or bathing, remove the wet dressing and gently blot the wound dry with a soft towel.  -Do not rub or apply any lotions or ointments to your incision site.   -Do not soak or scrub your wound  -Keep a dry dressing (guaze and paper tape) on your incision and have it changed daily for 14 days after surgery; more often if your incision is draining. Have your caregiver wash their hands thoroughly before changing your dressing.  -You will have absorbable sutures and skin glue on your incision. Leave the skin glue until it falls off; do not pick at your incision. Showering  -You may shower in approximately 2 days after your surgery.    -Leave the dressing on during your shower. Do NOT allow the water to run directly onto your dressing.    Once you get out of the shower, pat the area dry with a towel and put on a dry dressing.  -Reminder- your brace can be removed while showering. Remember to not bend or twist while your brace is off.    -Do not take a tub bath. Preventing blood clots  -You have been given T.E.D. stockings to wear. Continue to wear these for 7 days after your discharge. Put them on in the morning and take them off at night.    -They are used to increase your circulation and prevent blood clots from forming in your legs  -T. E.D. stockings can be machine washed, temperature not to exceed 160° F (71°C) and machine dried for 15 to 20 minutes, temperature not to exceed 250° F (121°C). Pain management  -Take pain medication as prescribed; decrease the amount you use as your pain lessens  -DO not wait until you are in extreme pain to take your medication.  -Avoid alcoholic beverages while taking pain medication    Pain Medication Safety  DO:  -Read the Medication Guide   -Take your medicine exactly as prescribed   -Store your medicine away from children and in a safe place   -Call your healthcare provider for medical advice about side effects.  -Please be aware that many medications contain Tylenol. We do not want you to over medicate so please read the information below as a guide. Do not take more than 4 Grams of Tylenol in a 24 hour period.   (There are 1000 milligrams in one Gram)                                                                                                                                                                                                                                                                                                                                                                  Percocet contains 325 mg of Tylenol per tablet (do not take more than 12 tablets in 24 hours)  Lortab contains 500 mg of Tylenol per tablet (do not take more than 8 tablets in 24 hours)  Norco contains 325 mg of Tylenol per tablet (do not take more than 12 tablets in 24 hours). DO NOT:  -Do not give your medicine to others   -Do not take medicine unless it was prescribed for you   -Do not stop taking your medicine without talking to your healthcare provider   -Do not break, chew, crush, dissolve, or inject your medicine. If you cannot swallow your medicine whole, talk to your healthcare provider.  -Do not drink alcohol while taking this medicine  -Do not take anti-inflammatory medications or aspirin unless instructed by your   physician.

## 2021-02-26 NOTE — PROGRESS NOTES
Problem: Self Care Deficits Care Plan (Adult)  Goal: *Acute Goals and Plan of Care (Insert Text)  Description: FUNCTIONAL STATUS PRIOR TO ADMISSION: Patient was independent and active without use of DME.    HOME SUPPORT: The patient lived with spouse but did not require assist.    Occupational Therapy Goals  Initiated 2/26/2021    1. Patient will perform lower body dressing with modified independence using Reacher, Stocking Aid, and Long [employer] PRN within 7 days. 2.  Patient will perform toileting with modified independence using most appropriate DME within 7 days. 3.  Patient will grooming standing at sink at modified independence within 7 days. 4.  Patient will don/doff back brace at modified independence within 7 days. 5.  Patient will verbalize/demonstrate 3/3 back precautions during ADL tasks without cues within 7 days. OCCUPATIONAL THERAPY EVALUATION  Patient: Noah Bettencourt (89 y.o. male)  Date: 2/26/2021  Primary Diagnosis: Lumbar spine instability [M53.2X6]  Procedure(s) (LRB):  L4-5  LAMINECTOMY WITH LEFT L4-5 FACIECTOMY AND INSTRUMENTATED FUSION ELEVATE POSTERIOR LUMBAR INTERBODY FUSION PEDICLE SCREWS AND ALLOGRAFT(O-ARM) (N/A) 1 Day Post-Op   Precautions: Back       ASSESSMENT  Based on the objective data described below, the patient presents with spinal precautions, decrease mobility, pain, generalized weakness and limited independence with self-care s/p recent L4-5 lami/fusion POD #1. Pt received in supine in bed, mod assist with bed mobility and min assist to don brace, ambulates with RW to access chair with min assist. Reviewed back precautions, sitting limit of 30-45 minutes, positioning in chair, and progress. Pt experiencing too much pain to review AE for LB dressing. Pt is not cleared for discharge from [address] [place] [address] standpoint at this time. Will benefit from therapy in acute setting, anticipate will improve tolerance quickly with improved pain control. Current Level of Function Impacting Discharge (ADLs/self-care): mod assist with bed mobility, pain, min assist with transfers, max to total assist with LB self-care    Functional Outcome Measure: The patient scored 60/100 on the Barthel outcome measure which is indicative of impairment. Other factors to consider for discharge: pain     Patient will benefit from skilled therapy intervention to address the above noted impairments. PLAN :  Recommendations and Planned Interventions: self care training, functional mobility training, therapeutic activities, endurance activities, patient education, home safety training, and family training/education    Frequency/Duration: Patient will be followed by occupational therapy 5 times a week to address goals. Recommendation for discharge: (in order for the patient to meet his/her long term goals)  No skilled occupational therapy/ follow up rehabilitation needs identified at this time. This discharge recommendation:  A follow-up discussion with the attending provider and/or case management is planned    IF patient discharges home will need the following DME: RW       SUBJECTIVE:   Patient stated I'm sorry if I'm being difficult, I have control issues.     OBJECTIVE DATA SUMMARY:   HISTORY:   Past Medical History:   Diagnosis Date    Arthritis     back    Chronic pain     LOWER BACK     Past Surgical History:   Procedure Laterality Date    HX VASECTOMY      HX WISDOM TEETH EXTRACTION         Expanded or extensive additional review of patient history:     Home Situation  Home Environment: Apartment  # Steps to Enter: 15  Rails to Enter: Yes  One/Two Story Residence: One story  Living Alone: No  Support Systems: Spouse/Significant Other/Partner  Patient Expects to be Discharged to[de-identified] Apartment  Current DME Used/Available at Home: None  Tub or Shower Type: Tub/Shower combination        EXAMINATION OF PERFORMANCE DEFICITS:  Cognitive/Behavioral Status:  Neurologic State: Alert  Orientation Level: Oriented X4  Cognition: Appropriate decision making        Safety/Judgement: Awareness of environment    Skin: intact    Edema: none noted    Hearing:       Vision/Perceptual:                                     Range of Motion:    AROM: Within functional limits                         Strength:    Strength: Generally decreased, functional                Coordination:  Coordination: Within functional limits  Fine Motor Skills-Upper: Left Intact; Right Intact    Gross Motor Skills-Upper: Left Intact; Right Intact    Tone & Sensation:    Tone: Normal  Sensation: Intact                      Balance:  Sitting: Intact  Standing: Intact; With support    Functional Mobility and Transfers for ADLs:  Bed Mobility:  Supine to Sit: Moderate assistance    Transfers:  Sit to Stand: Minimum assistance;Assist x1  Stand to Sit: Minimum assistance;Assist x1  Bed to Chair: Minimum assistance;Assist x1  Bathroom Mobility: Minimum assistance  Toilet Transfer : Minimum assistance;Assist x1    ADL Assessment:  Feeding: Independent    Oral Facial Hygiene/Grooming: Setup    Bathing: Moderate assistance    Upper Body Dressing: Minimum assistance    Lower Body Dressing: Maximum assistance; Total assistance    Toileting: Minimum assistance                ADL Intervention and task modifications:       Cognitive Retraining  Safety/Judgement: Awareness of environment    Therapeutic Exercise:     Functional Measure:  Barthel Index:    Bathin  Bladder: 10  Bowels: 10  Groomin  Dressin  Feeding: 10  Mobility: 5  Stairs: 0  Toilet Use: 5  Transfer (Bed to Chair and Back): 10  Total: 60/100        The Barthel ADL Index: Guidelines  1. The index should be used as a record of what a patient does, not as a record of what a patient could do. 2. The main aim is to establish degree of independence from any help, physical or verbal, however minor and for whatever reason.   3. The need for supervision renders the patient not independent. 4. A patient's performance should be established using the best available evidence. Asking the patient, friends/relatives and nurses are the usual sources, but direct observation and common sense are also important. However direct testing is not needed. 5. Usually the patient's performance over the preceding 24-48 hours is important, but occasionally longer periods will be relevant. 6. Middle categories imply that the patient supplies over 50 per cent of the effort. 7. Use of aids to be independent is allowed. Purvi Hui., Barthel, DSlyW. (4164). Functional evaluation: the Barthel Index. [address] [facility] (14)2. HOLLI Oneill, Ele Diaz, Yoan Colunga., Liz, [address] (1999). Measuring the change indisability after inpatient rehabilitation; comparison of the responsiveness of the Barthel Index and Functional [place] Measure. Journal of Neurology, Neurosurgery, and Psychiatry, 66(4), 520-198. TRU Truong.J.YAMILET, TRELL Saenz, & Aileen Medeiros M.A. (2004.) Assessment of post-stroke quality of life in cost-effectiveness studies: The usefulness of the Barthel Index and the EuroQoL-5D.  Quality of Life Research, 15, 483-68        Occupational Therapy Evaluation Charge Determination   History Examination Decision-Making   LOW Complexity : Brief history review  LOW Complexity : 1-3 performance deficits relating to physical, cognitive , or psychosocial skils that result in activity limitations and / or participation restrictions  LOW Complexity : No comorbidities that affect functional and no verbal or physical assistance needed to complete eval tasks       Based on the above components, the patient evaluation is determined to be of the following complexity level: LOW   Pain Rating:  10/10 pain    Activity Tolerance:   Fair    After treatment patient left in no apparent distress:    Sitting in chair and Call bell within reach    COMMUNICATION/EDUCATION:   The patients plan of care was discussed with: Physical therapist and Registered nurse. Home safety education was provided and the patient/caregiver indicated understanding., Patient/family have participated as able in goal setting and plan of care. , and Patient/family agree to work toward stated goals and plan of care. This patients plan of care is appropriate for delegation to [facility].     Thank you for this referral.  Sakina Galloway, OTR/L  Time Calculation: 38 mins

## 2021-02-26 NOTE — PROGRESS NOTES
Problem: Mobility Impaired (Adult and Pediatric)  Goal: *Acute Goals and Plan of Care (Insert Text)  Description: FUNCTIONAL STATUS PRIOR TO ADMISSION: Patient was independent and active without use of DME.    HOME SUPPORT PRIOR TO ADMISSION: The patient lived with spouse but did not require assist.    Physical Therapy Goals  Initiated 2/26/2021    1. Patient will move from supine to sit and sit to supine  and roll side to side in bed with independence within 4 days. 2. Patient will perform sit to stand with modified independence within 4 days. 3. Patient will ambulate with modified independence for 150 feet with the least restrictive device within 4 days. 4. Patient will ascend/descend full flight of stairs with 1 handrail(s) with modified independence within 4 days. 5. Patient will verbalize and demonstrate understanding of spinal precautions (No bending, lifting greater than 5 lbs, or twisting; log-roll technique; frequent repositioning as instructed) within 4 days. Outcome: Progressing Towards Goal   PHYSICAL THERAPY EVALUATION  Patient: Antonio Vázquez (1 y.o. male)  Date: 2/26/2021  Primary Diagnosis: Lumbar spine instability [M53.2X6]  Procedure(s) (LRB):  L4-5  LAMINECTOMY WITH LEFT L4-5 FACIECTOMY AND INSTRUMENTATED FUSION ELEVATE POSTERIOR LUMBAR INTERBODY FUSION PEDICLE SCREWS AND ALLOGRAFT(O-ARM) (N/A) 1 Day Post-Op   Precautions:   Back, Fall, No bending, no lifting greater than 5 lbs, no twisting, log-roll technique, repositioning every 20-30 min except when sleeping, brace when OOB        ASSESSMENT  Based on the objective data described below, the patient presents with back precautions, decreased activity tolerance due to severe back pain, generalized weakness, decreased balance, and impaired functional mobility below his baseline level. Provided pt with education regarding back precautions, log rolling, donning quickdraw, and sitting restriction.   Pt is moving slowly due to high pain levels and is anxious. Pt completed log roll transfer with extra time with moderate assist x 2, stood from raised bed with minimal assist x 2, and ambulated with a rolling walker x 4 feet. Pt c/o LLE weakness while standing. Pt remained up in chair at session end. Current Level of Function Impacting Discharge (mobility/balance):bed mobility moderate assist x 2, transfers with minimal assist x 2, ambulation with rolling walker x 4 feet    Functional Outcome Measure: The patient scored Total Score: 16/28 on the Tinetti outcome measure which is indicative of high fall risk. Other factors to consider for discharge: high pain levels limiting activity, fall risk, below baseline level      Patient will benefit from skilled therapy intervention to address the above noted impairments. PLAN :  Recommendations and Planned Interventions: bed mobility training, transfer training, gait training, orthotic/prosthetic training, and patient and family training/education      Frequency/Duration: Patient will be followed by physical therapy:  twice daily to address goals. Recommendation for discharge: (in order for the patient to meet his/her long term goals)  Physical therapy at least 2 days/week in the home versus none pending progress      This discharge recommendation:  Has been made in collaboration with the attending provider and/or case management    IF patient discharges home will need the following DME: rolling walker and reacher          SUBJECTIVE:   Patient stated Did I tear something.   Pt referring to his back while transferring to sit EOB.      OBJECTIVE DATA SUMMARY:   HISTORY:    Past Medical History:   Diagnosis Date    Arthritis     back    Chronic pain     LOWER BACK     Past Surgical History:   Procedure Laterality Date    HX VASECTOMY      HX WISDOM TEETH EXTRACTION         Personal factors and/or comorbidities impacting plan of care: full flight of stairs to apartment, fall risk, obesity Home Situation  Home Environment: Apartment  # Steps to Enter: 13  Rails to Enter: Yes  One/Two Story Residence: One story  Living Alone: No  Support Systems: Spouse/Significant Other/Partner  Patient Expects to be Discharged to[de-identified] Apartment  Current DME Used/Available at Home: None  Tub or Shower Type: Tub/Shower combination    EXAMINATION/PRESENTATION/DECISION MAKING:   Critical Behavior:  Neurologic State: Alert  Orientation Level: Oriented X4  Cognition: Appropriate decision making  Safety/Judgement: Awareness of environment       Skin:  dressing, drain intact    Range Of Motion:  AROM: Within functional limits                       Strength:    Strength: Generally decreased, functional, pt reports LLE while standing                     Tone & Sensation:   Tone: Normal              Sensation: Intact, pt reports an improvement is sensation post surgery, numbness in LLE and R thigh prior to surgery               Coordination:  Coordination: Within functional limits       Functional Mobility:  Bed Mobility:     Supine to Sit: Moderate assistance, assist x 2        Transfers:  Sit to Stand: Minimum assistance;Assist x2  Stand to Sit: Minimum assistance;Assist x2              Balance:   Sitting: Intact  Standing: Impaired, good with support of rolling walker  Ambulation/Gait Training:  Distance (ft): 4 Feet (ft)  Assistive Device: Brace/Splint;Gait belt;Walker, rolling  Ambulation - Level of Assistance: Contact guard assistance;Assist x2        Gait Abnormalities: Antalgic;Decreased step clearance, step to gait pattern         Base of Support: Widened     Speed/Maggie: Pace decreased (<100 feet/min); Delayed  Step Length: Left shortened;Right shortened               Functional Measure:  Tinetti test:    Sitting Balance: 1  Arises: 1  Attempts to Rise: 1  Immediate Standing Balance: 1  Standing Balance: 1  Nudged: 1  Eyes Closed: 1  Turn 360 Degrees - Continuous/Discontinuous: 0  Turn 360 Degrees - Steady/Unsteady: 0  Sitting Down: 2  Balance Score: 9 Balance total score  Indication of Gait: 1  R Step Length/Height: 1  L Step Length/Height: 1  R Foot Clearance: 1  L Foot Clearance: 1  Step Symmetry: 0  Step Continuity: 0  Path: 1  Trunk: 1  Walking Time: 0  Gait Score: 7 Gait total score  Total Score: 16/28 Overall total score         Tinetti Tool Score Risk of Falls  <19 = High Fall Risk  19-24 = Moderate Fall Risk  25-28 = Low Fall Risk  Tinetti ME. Performance-Oriented Assessment of Mobility Problems in Elderly Patients. [place]ala 64; [id number]. (Scoring Description: PT Bulletin Feb. 10, 1993)    Older adults: Shikha Boland et al, 2009; n = 1000 [facility] elderly evaluated with ABC, LORRIE, ADL, and IADL)  · Mean LORRIE score for males aged 69-68 years = 26.21(3.40)  · Mean LORRIE score for females age 69-68 years = 25.16(4.30)  · Mean LORRIE score for males over 80 years = 23.29(6.02)  · Mean LORRIE score for females over 80 years = 17.20(8.32)        Physical Therapy Evaluation Charge Determination   History Examination Presentation Decision-Making   MEDIUM  Complexity : 1-2 comorbidities / personal factors will impact the outcome/ POC  MEDIUM Complexity : 3 Standardized tests and measures addressing body structure, function, activity limitation and / or participation in recreation  LOW Complexity : Stable, uncomplicated  LOW Complexity : FOTO score of       Based on the above components, the patient evaluation is determined to be of the following complexity level: LOW     Pain Rating:  Verbal: 15  Location: back     Activity Tolerance:   Fair to poor, limited due to severe back pain      After treatment patient left in no apparent distress:   Sitting in chair and Call bell within reach    COMMUNICATION/EDUCATION:   The patients plan of care was discussed with: Registered nurse. Fall prevention education was provided and the patient/caregiver indicated understanding.  and Patient/family agree to work toward stated goals and plan of care.    Thank you for this referral.  Bhakti Corado   Time Calculation: 33 mins

## 2021-02-27 ENCOUNTER — APPOINTMENT (OUTPATIENT)
Dept: GENERAL RADIOLOGY | Age: 46
DRG: 454 | End: 2021-02-27
Attending: NEUROLOGICAL SURGERY
Payer: COMMERCIAL

## 2021-02-27 LAB
GLUCOSE BLD STRIP.AUTO-MCNC: 147 MG/DL (ref 65–100)
HGB BLD-MCNC: 12.4 G/DL (ref 12.1–17)
SERVICE CMNT-IMP: ABNORMAL

## 2021-02-27 PROCEDURE — 85018 HEMOGLOBIN: CPT

## 2021-02-27 PROCEDURE — 74011250637 HC RX REV CODE- 250/637: Performed by: PHYSICIAN ASSISTANT

## 2021-02-27 PROCEDURE — 97535 SELF CARE MNGMENT TRAINING: CPT

## 2021-02-27 PROCEDURE — 74018 RADEX ABDOMEN 1 VIEW: CPT

## 2021-02-27 PROCEDURE — 74011250637 HC RX REV CODE- 250/637: Performed by: NEUROLOGICAL SURGERY

## 2021-02-27 PROCEDURE — 82962 GLUCOSE BLOOD TEST: CPT

## 2021-02-27 PROCEDURE — 74011250636 HC RX REV CODE- 250/636: Performed by: NEUROLOGICAL SURGERY

## 2021-02-27 PROCEDURE — 77010033678 HC OXYGEN DAILY

## 2021-02-27 PROCEDURE — 36415 COLL VENOUS BLD VENIPUNCTURE: CPT

## 2021-02-27 PROCEDURE — 97530 THERAPEUTIC ACTIVITIES: CPT

## 2021-02-27 PROCEDURE — 74011000250 HC RX REV CODE- 250: Performed by: NEUROLOGICAL SURGERY

## 2021-02-27 PROCEDURE — 97116 GAIT TRAINING THERAPY: CPT

## 2021-02-27 PROCEDURE — 65270000029 HC RM PRIVATE

## 2021-02-27 RX ORDER — HYDROMORPHONE HYDROCHLORIDE 1 MG/ML
1 INJECTION, SOLUTION INTRAMUSCULAR; INTRAVENOUS; SUBCUTANEOUS
Status: DISCONTINUED | OUTPATIENT
Start: 2021-02-27 | End: 2021-03-02 | Stop reason: HOSPADM

## 2021-02-27 RX ORDER — BISACODYL 5 MG
10 TABLET, DELAYED RELEASE (ENTERIC COATED) ORAL DAILY PRN
Status: DISCONTINUED | OUTPATIENT
Start: 2021-02-27 | End: 2021-03-02 | Stop reason: HOSPADM

## 2021-02-27 RX ORDER — ADHESIVE BANDAGE
30 BANDAGE TOPICAL DAILY
Status: DISCONTINUED | OUTPATIENT
Start: 2021-02-27 | End: 2021-03-02 | Stop reason: HOSPADM

## 2021-02-27 RX ORDER — HYDROMORPHONE HYDROCHLORIDE 4 MG/1
4 TABLET ORAL
Status: DISCONTINUED | OUTPATIENT
Start: 2021-02-27 | End: 2021-02-27

## 2021-02-27 RX ORDER — SODIUM CHLORIDE AND POTASSIUM CHLORIDE .9; .15 G/100ML; G/100ML
SOLUTION INTRAVENOUS CONTINUOUS
Status: DISCONTINUED | OUTPATIENT
Start: 2021-02-27 | End: 2021-03-02 | Stop reason: HOSPADM

## 2021-02-27 RX ORDER — FACIAL-BODY WIPES
10 EACH TOPICAL DAILY
Status: COMPLETED | OUTPATIENT
Start: 2021-02-27 | End: 2021-02-27

## 2021-02-27 RX ORDER — ONDANSETRON 2 MG/ML
4 INJECTION INTRAMUSCULAR; INTRAVENOUS
Status: DISCONTINUED | OUTPATIENT
Start: 2021-02-27 | End: 2021-03-02 | Stop reason: HOSPADM

## 2021-02-27 RX ADMIN — ACETAMINOPHEN 1000 MG: 500 TABLET ORAL at 13:18

## 2021-02-27 RX ADMIN — OXYCODONE 5 MG: 5 TABLET ORAL at 20:25

## 2021-02-27 RX ADMIN — DOCUSATE SODIUM 100 MG: 100 CAPSULE, LIQUID FILLED ORAL at 17:33

## 2021-02-27 RX ADMIN — OXYCODONE 10 MG: 5 TABLET ORAL at 00:23

## 2021-02-27 RX ADMIN — OXYCODONE 5 MG: 5 TABLET ORAL at 17:33

## 2021-02-27 RX ADMIN — BISACODYL 10 MG: 5 TABLET, COATED ORAL at 04:36

## 2021-02-27 RX ADMIN — CHOLECALCIFEROL (VITAMIN D3) 25 MCG (1,000 UNIT) TABLET 2000 UNITS: TABLET at 08:32

## 2021-02-27 RX ADMIN — GABAPENTIN 300 MG: 300 CAPSULE ORAL at 21:05

## 2021-02-27 RX ADMIN — POTASSIUM CHLORIDE AND SODIUM CHLORIDE: 900; 150 INJECTION, SOLUTION INTRAVENOUS at 05:14

## 2021-02-27 RX ADMIN — Medication 5 ML: at 14:00

## 2021-02-27 RX ADMIN — OXYCODONE 5 MG: 5 TABLET ORAL at 13:18

## 2021-02-27 RX ADMIN — HYDROMORPHONE HYDROCHLORIDE 1 MG: 1 INJECTION, SOLUTION INTRAMUSCULAR; INTRAVENOUS; SUBCUTANEOUS at 07:27

## 2021-02-27 RX ADMIN — Medication 10 ML: at 21:06

## 2021-02-27 RX ADMIN — OXYCODONE 5 MG: 5 TABLET ORAL at 23:28

## 2021-02-27 RX ADMIN — ONDANSETRON 4 MG: 2 INJECTION INTRAMUSCULAR; INTRAVENOUS at 10:38

## 2021-02-27 RX ADMIN — ACETAMINOPHEN 1000 MG: 500 TABLET ORAL at 23:28

## 2021-02-27 RX ADMIN — GABAPENTIN 300 MG: 300 CAPSULE ORAL at 08:32

## 2021-02-27 RX ADMIN — Medication 10 ML: at 05:19

## 2021-02-27 RX ADMIN — ACETAMINOPHEN 1000 MG: 500 TABLET ORAL at 17:33

## 2021-02-27 RX ADMIN — TRAMADOL HYDROCHLORIDE 50 MG: 50 TABLET, FILM COATED ORAL at 19:14

## 2021-02-27 RX ADMIN — OXYCODONE 10 MG: 5 TABLET ORAL at 03:21

## 2021-02-27 RX ADMIN — MAGNESIUM HYDROXIDE 30 ML: 400 SUSPENSION ORAL at 10:38

## 2021-02-27 RX ADMIN — DIAZEPAM 5 MG: 5 TABLET ORAL at 00:26

## 2021-02-27 RX ADMIN — TRAMADOL HYDROCHLORIDE 50 MG: 50 TABLET, FILM COATED ORAL at 10:38

## 2021-02-27 RX ADMIN — PROCHLORPERAZINE EDISYLATE 5 MG: 5 INJECTION INTRAMUSCULAR; INTRAVENOUS at 06:18

## 2021-02-27 RX ADMIN — Medication 1.5 MG: at 21:05

## 2021-02-27 RX ADMIN — BISACODYL 10 MG: 10 SUPPOSITORY RECTAL at 10:38

## 2021-02-27 RX ADMIN — ACETAMINOPHEN 1000 MG: 500 TABLET ORAL at 03:00

## 2021-02-27 RX ADMIN — DOCUSATE SODIUM 100 MG: 100 CAPSULE, LIQUID FILLED ORAL at 08:32

## 2021-02-27 NOTE — PROGRESS NOTES
Pt comfortable now and resting. Courtney Holmch he is passing gas and having cramping. Vomited 60 cc green bile earlier and non since.

## 2021-02-27 NOTE — PROGRESS NOTES
Problem: Self Care Deficits Care Plan (Adult)  Goal: *Acute Goals and Plan of Care (Insert Text)  Description: FUNCTIONAL STATUS PRIOR TO ADMISSION: Patient was independent and active without use of DME.    HOME SUPPORT: The patient lived with spouse but did not require assist.    Occupational Therapy Goals  Initiated 2/26/2021    1.  Patient will perform lower body dressing with modified independence using Reacher, Stocking Aid, and Long Hand[employer] PRN within 7 days.  2.  Patient will perform toileting with modified independence using most appropriate DME within 7 days.    3.  Patient will grooming standing at sink at modified independence within 7 days.   4.  Patient will don/doff back brace at modified independence within 7 days.  5.  Patient will verbalize/demonstrate 3/3 back precautions during ADL tasks without cues within 7 days.        Outcome: Progressing Towards Goal     OCCUPATIONAL THERAPY TREATMENT  Patient: Alec Alfaro (45 y.o. male)  Date: 2/27/2021  Diagnosis: Lumbar spine instability [M53.2X6] <principal problem not specified>  Procedure(s) (LRB):  L4-5  LAMINECTOMY WITH LEFT L4-5 FACIECTOMY AND INSTRUMENTATED FUSION ELEVATE POSTERIOR LUMBAR INTERBODY FUSION PEDICLE SCREWS AND ALLOGRAFT(O-ARM) (N/A) 2 Days Post-Op  Precautions: Back, Fall  Chart, occupational therapy assessment, plan of care, and goals were reviewed.    ASSESSMENT  Patient continues with skilled OT services and is progressing towards goals.  Pt is very motivated and agreeable to therapy but very limited by pain during this session. Pt's wife at bedside and very supportive and involved in care. Pt able to independently verbalize 3/3 back precautions and technique of log roll. Pt performing bed mobility with overall mod A for sidelying to sitting EOB. Pt donned brace seated EOB with min A. Pt performing functional mobility to bathroom with min A, RW, and slow pace 2/2 pain. Pt able to void in standing with CGA. Seated in  chair educated pt on [address] AE (reacher, sock aid, shoe horn, and dressing stick). Pt verbalized understanding of education but will benefit from continued education while he remains here in hospital. Wife reports she will procure AE, RW, and elevated toilet seat. Discussed pt's high bed and educated on safety concerns with step up into high bed. Pt and spouse report he will use spare bedroom at d/c. Pt tolerated sitting up in chair for approx 20 minutes but c/o increased pain and was assisted back to bed with overall mod A for LE management. Pain and ability to access home (14 steps to enter) are biggest barriers at this time. Infer pt will make excellent progress once pain is managed and do not anticipate he will have OT needs at discharge vs. [facility]. Current Level of Function Impacting Discharge (ADLs): total A LB dressing, min A transfers    Other factors to consider for discharge: 14 steps to enter home, good family support         PLAN :  Patient continues to benefit from skilled intervention to address the above impairments. Continue treatment per established plan of care. to address goals. Recommend with staff: OOB to chair with RW 3x/day    Recommend next OT session: ADLs utilizing [address] AE    Recommendation for discharge: (in order for the patient to meet his/her long term goals)  No skilled occupational therapy/ follow up rehabilitation needs identified at this time. vs HHOT    This discharge recommendation:  Has not yet been discussed the attending provider and/or case management    IF patient discharges home will need the following DME: patient owns DME required for discharge- Wife is procuring DME prior to d/c       SUBJECTIVE:   Patient stated This pain is alot.     OBJECTIVE DATA SUMMARY:   Cognitive/Behavioral Status:  Neurologic State: Alert  Orientation Level: Oriented X4  Cognition: Follows commands             Functional Mobility and Transfers for ADLs:  Bed Mobility:  Rolling: Minimum assistance  Supine to Sit: Moderate assistance  Sit to Supine: Moderate assistance    Transfers:  Sit to Stand: Minimum assistance  Functional Transfers  Bathroom Mobility: Minimum assistance  Bed to Chair: Minimum assistance    Balance:  Sitting: Intact  Standing: Intact; With support  Standing - Static: Good;Constant support  Standing - Dynamic : Good;Constant support    ADL Intervention:                           Lower Body Dressing Assistance  Dressing Assistance: Total assistance(dependent)    Toileting  Toileting Assistance: Minimum assistance  Bladder Hygiene: Set-up  Clothing Management: Minimum assistance         Therapeutic Exercises:       Pain:  8/10    Activity Tolerance:   Good and requires rest breaks    After treatment patient left in no apparent distress:   Supine in bed, Heels elevated for pressure relief, Call bell within reach, Caregiver / family present and Side rails x 3    COMMUNICATION/COLLABORATION:   The patients plan of care was discussed with: Physical therapist and Registered nurse.      Job Plate  Time Calculation: 53 mins

## 2021-02-27 NOTE — PROGRESS NOTES
Pt has passed gas multiple times since receiving suppository and milk of mag. Experiencing some relief. No bm yet. Will continue to monitor    5:30pm: Notified Dr. Sandrita العراقي of pt's flatus and overall feeling better.  Orders to advance diet to clear liquids

## 2021-02-27 NOTE — PROGRESS NOTES
Pt started complaining of nausea. Abd distended and bowel sounds hypo. Called Dr Coco Shields and orders given.

## 2021-02-27 NOTE — PROGRESS NOTES
Problem: Mobility Impaired (Adult and Pediatric)  Goal: *Acute Goals and Plan of Care (Insert Text)  Description: FUNCTIONAL STATUS PRIOR TO ADMISSION: Patient was independent and active without use of DME.    HOME SUPPORT PRIOR TO ADMISSION: The patient lived with spouse but did not require assist.    Physical Therapy Goals  Initiated 2/26/2021    1. Patient will move from supine to sit and sit to supine  and roll side to side in bed with independence within 4 days. 2. Patient will perform sit to stand with modified independence within 4 days. 3. Patient will ambulate with modified independence for 150 feet with the least restrictive device within 4 days. 4. Patient will ascend/descend full flight of stairs with 1 handrail(s) with modified independence within 4 days. 5. Patient will verbalize and demonstrate understanding of spinal precautions (No bending, lifting greater than 5 lbs, or twisting; log-roll technique; frequent repositioning as instructed) within 4 days. Outcome: Progressing Towards Goal    PHYSICAL THERAPY TREATMENT  Patient: Gagandeep Caldwell (23 y.o. male)  Date: 2/27/2021  Diagnosis: Lumbar spine instability [M53.2X6] <principal problem not specified>  Procedure(s) (LRB):  L4-5  LAMINECTOMY WITH LEFT L4-5 FACIECTOMY AND INSTRUMENTATED FUSION ELEVATE POSTERIOR LUMBAR INTERBODY FUSION PEDICLE SCREWS AND ALLOGRAFT(O-ARM) (N/A) 2 Days Post-Op  Precautions: Back, Fall No bending, no lifting greater than 5 lbs, no twisting, log-roll technique, repositioning every 20-30 min except when sleeping, brace when OOB (if ordered)  Chart, physical therapy assessment, plan of care and goals were reviewed. ASSESSMENT  Patient continues with skilled PT services and is progressing towards goals. Patient initially reluctant to participate with PT due to pain, however, on second attempt patient agreeable.  Patient received pain medicine prior to this writer's arrival.  Patient still experiencing increased pain with bed mobility requiring mod A to complete. Patient able to amb 150 ft with RW with CGA, however, pain increased to 8/10 with ambulation.  Patient returned to bed in supine and repositioned with ice packs to help decrease pain.  Unable to assess stairs due to increased pain levels.     Current Level of Function Impacting Discharge (mobility/balance): Bed mobility mod A, Transfers min A; Ambulation x 150 feet with RW with CGA (increased pain with ambulation)    Other factors to consider for discharge: Pain with transfers and bed mobility         PLAN :  Patient continues to benefit from skilled intervention to address the above impairments.  Continue treatment per established plan of care.  to address goals.    Recommendation for discharge: (in order for the patient to meet his/her long term goals)  Physical therapy at least 2 days/week in the home     This discharge recommendation:  Has not yet been discussed the attending provider and/or case management    IF patient discharges home will need the following DME: to be determined (TBD)       SUBJECTIVE:   Patient stated “I am hurting, but I can try”    OBJECTIVE DATA SUMMARY:   Critical Behavior:  Neurologic State: Alert  Orientation Level: Oriented X4  Cognition: Follows commands  Safety/Judgement: Awareness of environment    Spinal diagnosis intervention:  The patient stated 3/3 back precautions when prompted. Reviewed all 3 back precautions, log roll technique, and sitting for 30 minutes at a time.    Functional Mobility Training:    Bed Mobility:  Log Rolling: Minimum assistance  Supine to Sit: Moderate assistance  Sit to Supine: Moderate assistance           Transfers:  Sit to Stand: Minimum assistance  Stand to Sit: Contact guard assistance        Bed to Chair: Minimum assistance                    Balance:  Sitting: Intact  Standing: Intact;With support  Standing - Static: Constant support;Good  Standing - Dynamic : Constant  support; Fair  Ambulation/Gait Training:  Distance (ft): 150 Feet (ft)  Assistive Device: Brace/Splint;Gait belt;Walker, rolling  Ambulation - Level of Assistance: Contact guard assistance        Gait Abnormalities: Antalgic;Decreased step clearance        Base of Support: Widened     Speed/Maggie: Pace decreased (<100 feet/min); Slow  Step Length: Left shortened;Right shortened             Stairs:   Unable today due to p! Pain Ratin/10 in back post ambulation;   Repositioned to assist with pain management post treatment    Activity Tolerance:   Fair;  increased p! Post ambulation to 8/10     After treatment patient left in no apparent distress:   Supine in bed, Call bell within reach, and Caregiver / family present    COMMUNICATION/COLLABORATION:   The patients plan of care was discussed with: Registered nurse.      Benny William, PT   Time Calculation: 24 mins

## 2021-02-27 NOTE — PROGRESS NOTES
Pt with increased pain and likely ileus overnight. Full distended abdomen. Nausea and vomiting. Made npo.   Will get kub  Dulcolax suppository

## 2021-02-28 PROCEDURE — 74011250637 HC RX REV CODE- 250/637: Performed by: NEUROLOGICAL SURGERY

## 2021-02-28 PROCEDURE — 65270000029 HC RM PRIVATE

## 2021-02-28 PROCEDURE — 74011250637 HC RX REV CODE- 250/637: Performed by: PHYSICIAN ASSISTANT

## 2021-02-28 PROCEDURE — 97530 THERAPEUTIC ACTIVITIES: CPT

## 2021-02-28 PROCEDURE — 74011250636 HC RX REV CODE- 250/636: Performed by: NEUROLOGICAL SURGERY

## 2021-02-28 PROCEDURE — 97116 GAIT TRAINING THERAPY: CPT

## 2021-02-28 RX ADMIN — OXYCODONE 5 MG: 5 TABLET ORAL at 08:30

## 2021-02-28 RX ADMIN — OXYCODONE 5 MG: 5 TABLET ORAL at 15:08

## 2021-02-28 RX ADMIN — OXYCODONE 5 MG: 5 TABLET ORAL at 21:26

## 2021-02-28 RX ADMIN — GABAPENTIN 300 MG: 300 CAPSULE ORAL at 21:26

## 2021-02-28 RX ADMIN — DOCUSATE SODIUM 100 MG: 100 CAPSULE, LIQUID FILLED ORAL at 08:29

## 2021-02-28 RX ADMIN — POTASSIUM CHLORIDE AND SODIUM CHLORIDE: 900; 150 INJECTION, SOLUTION INTRAVENOUS at 18:28

## 2021-02-28 RX ADMIN — OXYCODONE 5 MG: 5 TABLET ORAL at 12:09

## 2021-02-28 RX ADMIN — MAGNESIUM HYDROXIDE 30 ML: 400 SUSPENSION ORAL at 08:30

## 2021-02-28 RX ADMIN — Medication 5 ML: at 14:00

## 2021-02-28 RX ADMIN — CHOLECALCIFEROL (VITAMIN D3) 25 MCG (1,000 UNIT) TABLET 2000 UNITS: TABLET at 08:29

## 2021-02-28 RX ADMIN — GABAPENTIN 300 MG: 300 CAPSULE ORAL at 08:29

## 2021-02-28 RX ADMIN — OXYCODONE 5 MG: 5 TABLET ORAL at 02:27

## 2021-02-28 RX ADMIN — ACETAMINOPHEN 1000 MG: 500 TABLET ORAL at 05:39

## 2021-02-28 RX ADMIN — Medication 1.5 MG: at 21:26

## 2021-02-28 RX ADMIN — Medication 10 ML: at 05:35

## 2021-02-28 RX ADMIN — OXYCODONE 5 MG: 5 TABLET ORAL at 18:26

## 2021-02-28 RX ADMIN — OXYCODONE 5 MG: 5 TABLET ORAL at 05:39

## 2021-02-28 RX ADMIN — TRAMADOL HYDROCHLORIDE 50 MG: 50 TABLET, FILM COATED ORAL at 01:36

## 2021-02-28 RX ADMIN — Medication 10 ML: at 21:26

## 2021-02-28 RX ADMIN — TRAMADOL HYDROCHLORIDE 50 MG: 50 TABLET, FILM COATED ORAL at 17:32

## 2021-02-28 RX ADMIN — ACETAMINOPHEN 1000 MG: 500 TABLET ORAL at 17:32

## 2021-02-28 RX ADMIN — DOCUSATE SODIUM 100 MG: 100 CAPSULE, LIQUID FILLED ORAL at 17:32

## 2021-02-28 RX ADMIN — TRAMADOL HYDROCHLORIDE 50 MG: 50 TABLET, FILM COATED ORAL at 10:00

## 2021-02-28 NOTE — PROGRESS NOTES
Problem: Falls - Risk of  Goal: *Absence of Falls  Description: Document Clydene Bone Fall Risk and appropriate interventions in the flowsheet.   Outcome: Progressing Towards Goal  Note: Fall Risk Interventions:  Mobility Interventions: Bed/chair exit alarm         Medication Interventions: Assess postural VS orthostatic hypotension    Elimination Interventions: Bed/chair exit alarm, Call light in reach

## 2021-02-28 NOTE — PROGRESS NOTES
Problem: Mobility Impaired (Adult and Pediatric)  Goal: *Acute Goals and Plan of Care (Insert Text)  Description: FUNCTIONAL STATUS PRIOR TO ADMISSION: Patient was independent and active without use of DME.    HOME SUPPORT PRIOR TO ADMISSION: The patient lived with spouse but did not require assist.    Physical Therapy Goals  Initiated 2/26/2021    1. Patient will move from supine to sit and sit to supine  and roll side to side in bed with independence within 4 days. 2. Patient will perform sit to stand with modified independence within 4 days. 3. Patient will ambulate with modified independence for 150 feet with the least restrictive device within 4 days. 4. Patient will ascend/descend full flight of stairs with 1 handrail(s) with modified independence within 4 days. 5. Patient will verbalize and demonstrate understanding of spinal precautions (No bending, lifting greater than 5 lbs, or twisting; log-roll technique; frequent repositioning as instructed) within 4 days. Outcome: Progressing Towards Goal    PHYSICAL THERAPY TREATMENT  Patient: Ronak Eisenberg (51 y.o. male)  Date: 2/28/2021  Diagnosis: Lumbar spine instability [M53.2X6] <principal problem not specified>  Procedure(s) (LRB):  L4-5  LAMINECTOMY WITH LEFT L4-5 FACIECTOMY AND INSTRUMENTATED FUSION ELEVATE POSTERIOR LUMBAR INTERBODY FUSION PEDICLE SCREWS AND ALLOGRAFT(O-ARM) (N/A) 3 Days Post-Op  Precautions: Back, Fall No bending, no lifting greater than 5 lbs, no twisting, log-roll technique, repositioning every 20-30 min except when sleeping, brace when OOB (if ordered)  Chart, physical therapy assessment, plan of care and goals were reviewed. ASSESSMENT  Patient continues with skilled PT services and is progressing towards goals. Pt received supine in bed and willing to work with therapy. Pt able to verbalize all safety percautions, followed by demonstrating a good log roll to EOB with Min A . Pt able to don brace with set up only.  Pt presenting with increased thoracic ext with sitting EOB and apparent discomfort. Pt alba to STS from bedside with CGA with elevated surface. Pt continued with gait training to w/c outside room. Pt continued to stair training up to 5 steps with BHR for support, CGA with VC to relax UE and LB from hyperextension. Pt continued with gait training back to room with VC for core engagement to decrease LB hyperextension. Pt completed session seated in high back chair with brace. Pt stated he sat in recliner yesterday and left him in pain. Pt ed for straight back chair only to decrease the pain. Pt left with call bell within reach and all needs met at the time with family in room as well. Rn notified of session. Current Level of Function Impacting Discharge (mobility/balance): CGA for all mobility with additional time needed for slow movements and VC for relaxing. Other factors to consider for discharge: LB pain management, relaxation techniques         PLAN :  Patient continues to benefit from skilled intervention to address the above impairments. Continue treatment per established plan of care. to address goals. Recommendation for discharge: (in order for the patient to meet his/her long term goals)  Physical therapy at least 2 days/week in the home     This discharge recommendation:  Has not yet been discussed the attending provider and/or case management    IF patient discharges home will need the following DME: patient owns DME required for discharge       SUBJECTIVE:   Patient stated I could not sit in that chair for more than an hour yesterday.     OBJECTIVE DATA SUMMARY:   Critical Behavior:  Neurologic State: Alert  Orientation Level: Oriented X4  Cognition: Appropriate decision making  Safety/Judgement: Awareness of environment    Spinal diagnosis intervention:  The patient stated 3/3 back precautions when prompted.  Reviewed all 3 back precautions, log roll technique, and sitting for 30 minutes at a time. The patient required min cues to maintain back precautions during functional activity. Reviewed back brace application and wear schedule. Brace donned with supervision/set-up . Functional Mobility Training:    Bed Mobility:  Log Rolling: Stand-by assistance  Supine to Sit: Minimum assistance  Scooting: Stand-by assistance    Transfers:  Sit to Stand: Contact guard assistance; Additional time  Stand to Sit: Contact guard assistance; Additional time  Bed to Chair: Contact guard assistance; Additional time     Balance:  Sitting: Intact  Standing: Intact; With support  Standing - Static: Constant support;Good  Standing - Dynamic : Constant support;Good;Fair    Ambulation/Gait Training:  Distance (ft): 150 Feet (ft)  Assistive Device: Brace/Splint;Gait belt;Walker, rolling  Ambulation - Level of Assistance: Contact guard assistance  Gait Abnormalities: Decreased step clearance; Step to gait  Base of Support: Widened  Speed/Maggie: Pace decreased (<100 feet/min); Slow  Step Length: Right shortened;Left shortened     Stairs:  Number of Stairs Trained: 5  Stairs - Level of Assistance: Contact guard assistance; Additional time   Rail Use: Both    Pain Ratin/10 LB    Activity Tolerance:   Good and Fair    After treatment patient left in no apparent distress:   Sitting in chair, Call bell within reach and Caregiver / family present    COMMUNICATION/COLLABORATION:   The patients plan of care was discussed with: Registered nurse.      Sunita Abreu PTA   Time Calculation: 51 mins

## 2021-03-01 PROCEDURE — 97116 GAIT TRAINING THERAPY: CPT

## 2021-03-01 PROCEDURE — 74011250636 HC RX REV CODE- 250/636: Performed by: NEUROLOGICAL SURGERY

## 2021-03-01 PROCEDURE — 74011250637 HC RX REV CODE- 250/637: Performed by: PHYSICIAN ASSISTANT

## 2021-03-01 PROCEDURE — 74011250637 HC RX REV CODE- 250/637: Performed by: NEUROLOGICAL SURGERY

## 2021-03-01 PROCEDURE — 65270000029 HC RM PRIVATE

## 2021-03-01 PROCEDURE — 97535 SELF CARE MNGMENT TRAINING: CPT

## 2021-03-01 RX ORDER — MAGNESIUM CITRATE
148 SOLUTION, ORAL ORAL
Status: COMPLETED | OUTPATIENT
Start: 2021-03-01 | End: 2021-03-01

## 2021-03-01 RX ADMIN — OXYCODONE 5 MG: 5 TABLET ORAL at 00:25

## 2021-03-01 RX ADMIN — Medication 10 ML: at 15:53

## 2021-03-01 RX ADMIN — DOCUSATE SODIUM 100 MG: 100 CAPSULE, LIQUID FILLED ORAL at 17:29

## 2021-03-01 RX ADMIN — TRAMADOL HYDROCHLORIDE 50 MG: 50 TABLET, FILM COATED ORAL at 17:29

## 2021-03-01 RX ADMIN — TRAMADOL HYDROCHLORIDE 50 MG: 50 TABLET, FILM COATED ORAL at 23:28

## 2021-03-01 RX ADMIN — OXYCODONE 5 MG: 5 TABLET ORAL at 03:30

## 2021-03-01 RX ADMIN — Medication 10 ML: at 05:48

## 2021-03-01 RX ADMIN — OXYCODONE 5 MG: 5 TABLET ORAL at 15:53

## 2021-03-01 RX ADMIN — TRAMADOL HYDROCHLORIDE 50 MG: 50 TABLET, FILM COATED ORAL at 10:36

## 2021-03-01 RX ADMIN — OXYCODONE 5 MG: 5 TABLET ORAL at 21:58

## 2021-03-01 RX ADMIN — ACETAMINOPHEN 1000 MG: 500 TABLET ORAL at 00:25

## 2021-03-01 RX ADMIN — ACETAMINOPHEN 1000 MG: 500 TABLET ORAL at 23:28

## 2021-03-01 RX ADMIN — OXYCODONE 5 MG: 5 TABLET ORAL at 06:45

## 2021-03-01 RX ADMIN — POTASSIUM CHLORIDE AND SODIUM CHLORIDE: 900; 150 INJECTION, SOLUTION INTRAVENOUS at 22:13

## 2021-03-01 RX ADMIN — GABAPENTIN 300 MG: 300 CAPSULE ORAL at 21:58

## 2021-03-01 RX ADMIN — DOCUSATE SODIUM 100 MG: 100 CAPSULE, LIQUID FILLED ORAL at 08:30

## 2021-03-01 RX ADMIN — ACETAMINOPHEN 1000 MG: 500 TABLET ORAL at 11:40

## 2021-03-01 RX ADMIN — OXYCODONE 5 MG: 5 TABLET ORAL at 12:46

## 2021-03-01 RX ADMIN — ACETAMINOPHEN 1000 MG: 500 TABLET ORAL at 06:45

## 2021-03-01 RX ADMIN — Medication 1.5 MG: at 21:58

## 2021-03-01 RX ADMIN — Medication 10 ML: at 21:57

## 2021-03-01 RX ADMIN — MAGNESIUM CITRATE 148 ML: 1.75 LIQUID ORAL at 11:40

## 2021-03-01 RX ADMIN — MAGNESIUM HYDROXIDE 30 ML: 400 SUSPENSION ORAL at 08:31

## 2021-03-01 RX ADMIN — OXYCODONE 5 MG: 5 TABLET ORAL at 09:24

## 2021-03-01 RX ADMIN — CHOLECALCIFEROL (VITAMIN D3) 25 MCG (1,000 UNIT) TABLET 2000 UNITS: TABLET at 08:30

## 2021-03-01 RX ADMIN — GABAPENTIN 300 MG: 300 CAPSULE ORAL at 08:30

## 2021-03-01 NOTE — PROGRESS NOTES
Problem: Self Care Deficits Care Plan (Adult)  Goal: *Acute Goals and Plan of Care (Insert Text)  Description: FUNCTIONAL STATUS PRIOR TO ADMISSION: Patient was independent and active without use of DME.    HOME SUPPORT: The patient lived with spouse but did not require assist.    Occupational Therapy Goals  Initiated 2/26/2021    1. Patient will perform lower body dressing with modified independence using Reacher, Stocking Aid, and Long [employer] PRN within 7 days. 2.  Patient will perform toileting with modified independence using most appropriate DME within 7 days. 3.  Patient will grooming standing at sink at modified independence within 7 days. 4.  Patient will don/doff back brace at modified independence within 7 days. 5.  Patient will verbalize/demonstrate 3/3 back precautions during ADL tasks without cues within 7 days. Outcome: Progressing Towards Goal    OCCUPATIONAL THERAPY TREATMENT  Patient: Jennifer Cruz (46 y.o. male)  Date: 3/1/2021  Diagnosis: Lumbar spine instability [M53.2X6] <principal problem not specified>  Procedure(s) (LRB):  L4-5  LAMINECTOMY WITH LEFT L4-5 FACIECTOMY AND INSTRUMENTATED FUSION ELEVATE POSTERIOR LUMBAR INTERBODY FUSION PEDICLE SCREWS AND ALLOGRAFT(O-ARM) (N/A) 4 Days Post-Op  Precautions: Back, Fall  Chart, occupational therapy assessment, plan of care, and goals were reviewed. ASSESSMENT  Patient continues with skilled OT services and is progressing towards goals. Pt progressing nicely with mobility/balance and ADL independence, functionally evidenced by completing standing grooming and simulated toileting with Standby-assist and Min verbal cues for symmetrical reaching during standing grooming and for relaxing BUE and allow for natural arm swing during bathroom mobility, without use of DME.   Pt educated on recommendation for tub-transfer bench, with good understanding verbalized by pt and wife, with wife verbalizing she will independently acquire. Given pt's high level of current safe functional independence, DME/AE needs fulfilled within him, good PRN assist available from wife and good functional compliance with spinal precautions, pt safe for discharge home from a self-care standpoint, with reporting therapist anticipating no future OT needs once discharged; however, acute OT will continue to follow during acute hospitalization. Current Level of Function Impacting Discharge (ADLs): Min A    Other factors to consider for discharge: none         PLAN :  Patient continues to benefit from skilled intervention to address the above impairments. Continue treatment per established plan of care. to address goals. Recommend with staff: OOB meals, SBA toileting, Active ADL engagement    Recommend next OT session: POC progression    Recommendation for discharge: (in order for the patient to meet his/her long term goals)  No skilled occupational therapy/ follow up rehabilitation needs identified at this time. This discharge recommendation:  Has been made in collaboration with the attending provider and/or case management    IF patient discharges home will need the following DME: patient owns DME required for discharge       SUBJECTIVE:   Patient stated [facility] for your help.     OBJECTIVE DATA SUMMARY:   Cognitive/Behavioral Status:  Neurologic State: Alert  Orientation Level: Oriented X4  Cognition: Appropriate decision making; Appropriate for age attention/concentration; Appropriate safety awareness  Perception: Appears intact  Perseveration: No perseveration noted  Safety/Judgement: Awareness of environment; Insight into deficits;Home safety    Functional Mobility and Transfers for ADLs:  Bed Mobility:  Rolling: Modified independent  Supine to Sit: Modified independent  Sit to Supine: Assist x1;Minimum assistance  Scooting: Independent    Transfers:  Sit to Stand: Modified independent    Balance:  Sitting: Intact  Standing: Intact; With support    ADL Intervention:  Grooming  Grooming Assistance: Stand-by assistance  Position Performed: Standing  Washing Face: Stand-by assistance  Washing Hands: Stand-by assistance  Cues: Verbal cues provided(for symmetrical reaching)    Toileting  Toileting Assistance: Stand-by assistance  Bladder Hygiene: Modified independent  Bowel Hygiene: Supervision  Clothing Management: Stand-by assistance  Cues: Verbal cues provided(to relax BUE and allow for natural arm swing)    Cognitive Retraining  Safety/Judgement: Awareness of environment; Insight into deficits;Home safety    Patient instructed and indicated understanding the benefits of maintaining activity tolerance, functional mobility, and independence with self care tasks during acute stay  to ensure safe return home and to baseline. Encouraged patient to increase frequency and duration OOB, be out of bed for all meals, perform daily ADLs (as approved by RN/MD regarding bathing etc), and performing functional mobility to/from bathroom. Patient instructed and indicated understanding the benefits of maintaining activity tolerance, functional mobility, and independence with self care tasks during acute stay  to ensure safe return home and to baseline. Encouraged patient to increase frequency and duration OOB, be out of bed for all meals, perform daily ADLs (as approved by RN/MD regarding bathing etc), and performing functional mobility to/from bathroom. Pain:  Pt with c/o abdominal discomfort, did not quantify; nursing aware and following    Activity Tolerance:   Good, Fair, and requires rest breaks    After treatment patient left in no apparent distress:   Sitting in chair, Call bell within reach, and Caregiver / family present    COMMUNICATION/COLLABORATION:   The patients plan of care was discussed with: Physical therapist, Registered nurse, and Case management.      Nicolasa Dee OT  Time Calculation: 30 mins

## 2021-03-01 NOTE — PROGRESS NOTES
Problem: Mobility Impaired (Adult and Pediatric)  Goal: *Acute Goals and Plan of Care (Insert Text)  Description: FUNCTIONAL STATUS PRIOR TO ADMISSION: Patient was independent and active without use of DME.    HOME SUPPORT PRIOR TO ADMISSION: The patient lived with spouse but did not require assist.    Physical Therapy Goals  Initiated 2/26/2021    1. Patient will move from supine to sit and sit to supine  and roll side to side in bed with independence within 4 days. 2. Patient will perform sit to stand with modified independence within 4 days. 3. Patient will ambulate with modified independence for 150 feet with the least restrictive device within 4 days. 4. Patient will ascend/descend full flight of stairs with 1 handrail(s) with modified independence within 4 days. 5. Patient will verbalize and demonstrate understanding of spinal precautions (No bending, lifting greater than 5 lbs, or twisting; log-roll technique; frequent repositioning as instructed) within 4 days. 3/1/2021 1539 by Misael Escobedo  Outcome: Progressing Towards Goal   PHYSICAL THERAPY TREATMENT  Patient: Sanket Vences (88 y.o. male)  Date: 3/1/2021  Diagnosis: Lumbar spine instability [M53.2X6] <principal problem not specified>  Procedure(s) (LRB):  L4-5  LAMINECTOMY WITH LEFT L4-5 FACIECTOMY AND INSTRUMENTATED FUSION ELEVATE POSTERIOR LUMBAR INTERBODY FUSION PEDICLE SCREWS AND ALLOGRAFT(O-ARM) (N/A) 4 Days Post-Op  Precautions: Back, Fall No bending, no lifting greater than 5 lbs, no twisting, log-roll technique, repositioning every 20-30 min except when sleeping, brace when OOB (if ordered)  Chart, physical therapy assessment, plan of care and goals were reviewed. ASSESSMENT  Patient continues with skilled PT services and is progressing towards goals. Pt received sitting up in chair. Pt reports feeling a little queasy but agreeable to participate.   Pt stood with modified independence and ambulated x 350 feet, with rolling walker 175 feet, without assistive device x 175 feet. Pt requested to try stairs again and tolerated full flight of stairs without difficulty using one handrail. Pt returned to room and sitting in chair. Pt is cleared for discharge from a PT standpoint. Pt also cleared to ambulate in hallway with his wife. Current Level of Function Impacting Discharge (mobility/balance): transfers with modified independence, ambulation with standby assist, progressed to ambulation without assistive device     Other factors to consider for discharge: moderate pain, progressing steadily, has supportive wife         PLAN :  Patient continues to benefit from skilled intervention to address the above impairments. Continue treatment per established plan of care. to address goals. Recommendation for discharge: (in order for the patient to meet his/her long term goals)  Physical therapy at least 2 days/week in the home versus none with continued progress     This discharge recommendation:  Has not yet been discussed the attending provider and/or case management    IF patient discharges home will need the following DME: patient is planning to purchase a rolling walker or a cane        SUBJECTIVE:   Patient stated I am feeling queasy. I just drank something to help me go.     OBJECTIVE DATA SUMMARY:   Critical Behavior:  Neurologic State: Alert  Orientation Level: Oriented X4  Cognition: Appropriate decision making, Appropriate for age attention/concentration, Appropriate safety awareness  Safety/Judgement: Awareness of environment, Insight into deficits, Home safety    Spinal diagnosis intervention:  The patient stated 3/3 back precautions when prompted. Reviewed all 3 back precautions, log roll technique, and sitting for 30 minutes at a time. Reviewed back brace application and wear schedule. Pt loosened his brace upon sitting due to abdominal discomfort. Pt reminded to keep brace tight at all times.       Functional Mobility Training:    Bed Mobility:  Not assessed, OOB in chair         Transfers:  Sit to Stand: Modified independent  Stand to Sit: Modified independent                             Balance:  Sitting: Intact  Standing: Intact  Ambulation/Gait Training:  Distance (ft): 350 Feet (ft), 175 feet with rolling walker, 175 feet without an assistive device   Assistive Device: Brace/Splint;Gait belt;Walker, rolling  Ambulation - Level of Assistance: stand by assist        Gait Abnormalities: Antalgic;Decreased step clearance              Speed/Maggie: Slow  Step Length: Right shortened;Left shortened                Stairs:  Number of Stairs Trained: 13  Stairs - Level of Assistance: Modified independent   Rail Use: Both      Pain Rating:  Verbal: 7  Location: back     Activity Tolerance:   Good    After treatment patient left in no apparent distress:   Sitting in chair and Call bell within reach    COMMUNICATION/COLLABORATION:   The patients plan of care was discussed with: Registered nurse.      Bhakti Franz   Time Calculation: 17 mins

## 2021-03-01 NOTE — PROGRESS NOTES
Spine Surgery Progress Note  [facility]    Admit Date: 2021   LOS: 4 days      Daily Progress Note: 3/1/2021    POD:4 Day Post-Op    S/P: Procedure(s):  L4-5  LAMINECTOMY WITH LEFT L4-5 FACIECTOMY AND INSTRUMENTATED FUSION ELEVATE POSTERIOR LUMBAR INTERBODY FUSION PEDICLE SCREWS AND ALLOGRAFT(O-ARM)    HPI: Mr. Ana Mejia is a 47-year-old gentleman with severe refractory left L5 radiculopathy. MRI revealed deformity at L4-5. He had a partially sacralized L5-S1. There is lateral listhesis, facet hypertrophy on the left and scoliotic deformity at this level. After discussing treatment options and risks of surgery, informed consent was obtained. Patient underwent complete L4-L5 laminectomy, left L4-5 facetectomy with instrumented fusion L4-5 on 21. Subjective:     Pt with ileus over the weekend. Currently passing gas but has not had a bowel movement yet. He had some issues with pain going down his legs when he sits, but seems to improve when he stands up. Patient denies chest pain,  vomiting, difficulty swallowing, headache, and dyspnea. Objective:     Vital signs  Afebrile. Temp (24hrs), Av.3 °F (36.8 °C), Min:98.2 °F (36.8 °C), Max:98.4 °F (36.9 °C)   701 -  190  In: 120 [P.O.:120]  Out: -   1901 -  0700  In: 8559 [P.O.:240;  I.V.:2000]  Out: 700 [Urine:700]    Visit Vitals  BP (!) 148/88 (BP 1 Location: Right upper arm, BP Patient Position: At rest)   Pulse 99   Temp 98.2 °F (36.8 °C)   Resp 18   Wt 130 kg (286 lb 9.6 oz)   SpO2 95%   BMI 46.26 kg/m²    O2 Flow Rate (L/min): 2 l/min O2 Device: Room air     Voiding status: voiding independently without issue  Output (mL)  Urine Voided: 700 ml (21 0241)  Emesis: 60 mL (21 0526)  Last Bowel Movement Date: 21 (21 8197)  Unmeasurable Output  Urine Occurrence(s): 1 (21)  Emesis Occurrence(s): 1 (21)     Pain control  Pain Assessment  Pain Scale 1: Numeric (0 - 10)  Pain Intensity 1: 6  Pain Onset 1: postop  Pain Location 1: Back  Pain Orientation 1: Mid  Pain Description 1: Throbbing  Pain Intervention(s) 1: Distraction, Medication (see MAR)    PT/OT  Gait     Gait  Base of Support: Widened  Speed/Maggie: Slow  Step Length: Right shortened, Left shortened  Gait Abnormalities: Antalgic, Decreased step clearance  Ambulation - Level of Assistance: Contact guard assistance  Distance (ft): 350 Feet (ft)  Assistive Device: Brace/Splint, Gait belt, Walker, rolling  Rail Use: Both  Stairs - Level of Assistance: Modified independent  Number of Stairs Trained: 13        Physical Exam:  Gen: No acute distress. Neuro: A&Ox3. Follows commands. Speech clear. Affect normal.  OLIVER spontaneously. Strength 5/5 in UE and LE BL. Sensation intact. Gait deferred. Calves soft and supple; No pain with passive stretch  Skin: Incision C/D/I  Abd distended, non-tender. 24 hour results:    No results found for this or any previous visit (from the past 24 hour(s)). Assessment:     Active Problems:    Lumbar spine instability (2/25/2021)      Plan:     1) s/p L4-5 laminectomy, facetectomy, and fusion  -PT/OT - in lumbar brace    -Pain control - scheduled tylenol, gabapentin, prn oxycodone   -Drain - D/C 02/26   -Diet - tolerating regular   -Voiding without issue              - cont to work on BM. Mag citrate prn.  Patient is passing gas but do not want to send him home until he has a bowel movement    Readiness for discharge:     [] Vital Signs stable    [x] Hgb stable    [x] + Voiding    [x] Wound intact, drainage minimal    [x] Tolerating PO intake     [x] Cleared by PT (OT if applicable)    [x] Adequate pain control on oral medication alone     Activity: up with assist  DVT ppx: SCDs  Dispo: likely home tomorrow if pt has BM    Plan d/w Dr. Kamilla Pfeiffer, wife at bedside      Randall Rosales NP

## 2021-03-01 NOTE — PROGRESS NOTES
Problem: Falls - Risk of  Goal: *Absence of Falls  Description: Document Kashif Fall Risk and appropriate interventions in the flowsheet.  Outcome: Progressing Towards Goal  Note: Fall Risk Interventions:  Mobility Interventions: Bed/chair exit alarm         Medication Interventions: Assess postural VS orthostatic hypotension    Elimination Interventions: Bed/chair exit alarm, Call light in reach

## 2021-03-01 NOTE — PROGRESS NOTES
Problem: Mobility Impaired (Adult and Pediatric)  Goal: *Acute Goals and Plan of Care (Insert Text)  Description: FUNCTIONAL STATUS PRIOR TO ADMISSION: Patient was independent and active without use of DME.    HOME SUPPORT PRIOR TO ADMISSION: The patient lived with spouse but did not require assist.    Physical Therapy Goals  Initiated 2/26/2021    1. Patient will move from supine to sit and sit to supine  and roll side to side in bed with independence within 4 days. 2. Patient will perform sit to stand with modified independence within 4 days. 3. Patient will ambulate with modified independence for 150 feet with the least restrictive device within 4 days. 4. Patient will ascend/descend full flight of stairs with 1 handrail(s) with modified independence within 4 days. 5. Patient will verbalize and demonstrate understanding of spinal precautions (No bending, lifting greater than 5 lbs, or twisting; log-roll technique; frequent repositioning as instructed) within 4 days. Outcome: Progressing Towards Goal   PHYSICAL THERAPY TREATMENT  Patient: Morena Baez (52 y.o. male)  Date: 3/1/2021  Diagnosis: Lumbar spine instability [M53.2X6] <principal problem not specified>  Procedure(s) (LRB):  L4-5  LAMINECTOMY WITH LEFT L4-5 FACIECTOMY AND INSTRUMENTATED FUSION ELEVATE POSTERIOR LUMBAR INTERBODY FUSION PEDICLE SCREWS AND ALLOGRAFT(O-ARM) (N/A) 4 Days Post-Op  Precautions: Back, Fall No bending, no lifting greater than 5 lbs, no twisting, log-roll technique, repositioning every 20-30 min except when sleeping, brace when OOB (if ordered)  Chart, physical therapy assessment, plan of care and goals were reviewed. ASSESSMENT  Patient continues with skilled PT services and is progressing towards goals. Pt reports he just returned to bed after sitting in chair for an hour but agreeable to work with therapy. Pt uses bed rail to assist with log rolling transfer to sit EOB.   Pt tolerated increased ambulation distance with a rolling walker and cleared on a full flight of stairs. Current Level of Function Impacting Discharge (mobility/balance): bed mobility up to minimal assistance to lift BLE into bed, modified independence to transfer OOB with use of bed rails    Other factors to consider for discharge: moderate pain with activity         PLAN :  Patient continues to benefit from skilled intervention to address the above impairments. Continue treatment per established plan of care. to address goals. Recommendation for discharge: (in order for the patient to meet his/her long term goals)  Physical therapy at least 2 days/week in the home     This discharge recommendation:  Has not yet been discussed the attending provider and/or case management    IF patient discharges home will need the following DME: rolling walker, reacher, may benefit from a bed rail       SUBJECTIVE:   Patient stated Do you need to get another person help( with getting back in bed)?     OBJECTIVE DATA SUMMARY:   Critical Behavior:  Neurologic State: Alert  Orientation Level: Oriented X4  Cognition: Appropriate decision making  Safety/Judgement: Awareness of environment    Spinal diagnosis intervention:  The patient stated 3/3 back precautions when prompted. Reviewed all 3 back precautions, log roll technique, and sitting for 30 minutes at a time. The patient required verbal cues to maintain back precautions during functional activity. Reviewed back brace application and wear schedule. Brace donned with independence      Functional Mobility Training:    Bed Mobility:  Log Rolling: Modified independent  Supine to Sit: Modified independent, provided cues to avoid twisting   Sit to Supine: Assist x1;Minimum assistance  Scooting: Independent        Transfers:  Sit to Stand: Modified independent  Stand to Sit: Modified independent                             Balance:  Sitting: Intact  Standing: Intact; With support  Ambulation/Gait Training:  Distance (ft): 350 Feet (ft)  Assistive Device: Brace/Splint;Gait belt;Walker, rolling  Ambulation - Level of Assistance: Contact guard assistance        Gait Abnormalities: Antalgic;Decreased step clearance              Speed/Maggie: Slow  Step Length: Right shortened;Left shortened          Stairs:  Number of Stairs Trained: 13  Stairs - Level of Assistance: Modified independent   Rail Use: Both    Pain Rating:  Verbal: 7  Location: back     Activity Tolerance:   Good    After treatment patient left in no apparent distress:   Supine in bed and Call bell within reach    COMMUNICATION/COLLABORATION:   The patients plan of care was discussed with: Registered nurse.      Bhakti Grijalva Scripture   Time Calculation: 17 mins

## 2021-03-02 ENCOUNTER — HOME HEALTH ADMISSION (OUTPATIENT)
Dept: HOME HEALTH SERVICES | Facility: HOME HEALTH | Age: 46
End: 2021-03-02
Payer: COMMERCIAL

## 2021-03-02 VITALS
DIASTOLIC BLOOD PRESSURE: 81 MMHG | SYSTOLIC BLOOD PRESSURE: 139 MMHG | OXYGEN SATURATION: 96 % | RESPIRATION RATE: 18 BRPM | TEMPERATURE: 98.5 F | HEART RATE: 93 BPM | WEIGHT: 286.6 LBS | BODY MASS INDEX: 46.26 KG/M2

## 2021-03-02 PROCEDURE — 74011250637 HC RX REV CODE- 250/637: Performed by: NEUROLOGICAL SURGERY

## 2021-03-02 RX ADMIN — OXYCODONE 5 MG: 5 TABLET ORAL at 10:12

## 2021-03-02 RX ADMIN — TRAMADOL HYDROCHLORIDE 50 MG: 50 TABLET, FILM COATED ORAL at 10:56

## 2021-03-02 RX ADMIN — OXYCODONE 5 MG: 5 TABLET ORAL at 01:55

## 2021-03-02 RX ADMIN — DOCUSATE SODIUM 100 MG: 100 CAPSULE, LIQUID FILLED ORAL at 10:12

## 2021-03-02 RX ADMIN — CHOLECALCIFEROL (VITAMIN D3) 25 MCG (1,000 UNIT) TABLET 2000 UNITS: TABLET at 10:12

## 2021-03-02 RX ADMIN — OXYCODONE 5 MG: 5 TABLET ORAL at 06:03

## 2021-03-02 RX ADMIN — ACETAMINOPHEN 1000 MG: 500 TABLET ORAL at 06:03

## 2021-03-02 RX ADMIN — GABAPENTIN 300 MG: 300 CAPSULE ORAL at 10:12

## 2021-03-02 NOTE — PROGRESS NOTES
Spine Surgery Progress Note  Sylvia Huynh PA-C    Admit Date: 2021   LOS: 5 days      Daily Progress Note: 3/2/2021    POD:5 Day Post-Op    S/P: Procedure(s):  L4-5  LAMINECTOMY WITH LEFT L4-5 FACIECTOMY AND INSTRUMENTATED FUSION ELEVATE POSTERIOR LUMBAR INTERBODY FUSION PEDICLE SCREWS AND ALLOGRAFT(O-ARM)    HPI: Mr. Sergey Mcnair is a 47-year-old gentleman with severe refractory left L5 radiculopathy. MRI revealed deformity at L4-5. He had a partially sacralized L5-S1. There is lateral listhesis, facet hypertrophy on the left and scoliotic deformity at this level. After discussing treatment options and risks of surgery, informed consent was obtained. Patient underwent complete L4-L5 laminectomy, left L4-5 facetectomy with instrumented fusion L4-5 on 21. Subjective:     Pt with ileus over the weekend. Had 2 BMs overnight and now feels much better. He had some issues with pain going down his legs when he sits, but seems to improve when he stands up. Pt has done well ambulating and has cleared PT. Patient denies chest pain,  vomiting, difficulty swallowing, headache, and dyspnea. Objective:     Vital signs  Afebrile. Temp (24hrs), Av.9 °F (36.6 °C), Min:97.7 °F (36.5 °C), Max:98.2 °F (36.8 °C)   No intake/output data recorded.  1901 -  0700  In: 1240 [P.O.:240;  I.V.:1000]  Out: 700 [Urine:700]    Visit Vitals  BP (!) 138/96   Pulse 93   Temp 98 °F (36.7 °C)   Resp 16   Wt 130 kg (286 lb 9.6 oz)   SpO2 96%   BMI 46.26 kg/m²    O2 Flow Rate (L/min): 2 l/min O2 Device: Room air     Voiding status: voiding independently without issue  Output (mL)  Urine Voided: 700 ml (21 0241)  Emesis: 60 mL (21 0526)  Last Bowel Movement Date: 21 (21 0155)  Unmeasurable Output  Urine Occurrence(s): 1 (21 0315)  Emesis Occurrence(s): 1 (21 4453)     Pain control  Pain Assessment  Pain Scale 1: Numeric (0 - 10)  Pain Intensity 1: 4  Pain Onset 1: postop  Pain Location 1: Back  Pain Orientation 1: Lower  Pain Description 1: Aching  Pain Intervention(s) 1: Medication (see MAR)    PT/OT  Gait     Gait  Base of Support: Widened  Speed/Maggie: Slow  Step Length: Right shortened, Left shortened  Gait Abnormalities: Antalgic, Decreased step clearance  Ambulation - Level of Assistance: Contact guard assistance  Distance (ft): 350 Feet (ft)  Assistive Device: Brace/Splint, Gait belt, Walker, rolling  Rail Use: Both  Stairs - Level of Assistance: Modified independent  Number of Stairs Trained: 13        Physical Exam:  Gen: No acute distress. Neuro: A&Ox3. Follows commands. Speech clear. Affect normal.  OLIVER spontaneously. Strength 5/5 in UE and LE BL. Sensation intact. Gait deferred. Calves soft and supple; No pain with passive stretch  Skin: Incision C/D/I  Abd distended, non-tender. 24 hour results:    No results found for this or any previous visit (from the past 24 hour(s)).      Assessment:     Active Problems:    Lumbar spine instability (2/25/2021)      Plan:     1) s/p L4-5 laminectomy, facetectomy, and fusion  -PT/OT - in lumbar brace    -Pain control - scheduled tylenol, gabapentin, prn oxycodone, tramadol   -Drain - D/C 02/26   -Diet - advance slowly   -Voiding without issue              -Pt has had multiple BMs and now feels much better    Readiness for discharge:     [x] Vital Signs stable    [x] Hgb stable    [x] + Voiding    [x] Wound intact, drainage minimal    [x] Tolerating PO intake     [x] Cleared by PT (OT if applicable)    [x] Adequate pain control on oral medication alone     Activity: up with assist  DVT ppx: SCDs  Dispo: home with [facility] today    Plan d/w Dr. Magdalene Ortiz, patient's nurse    FARHAT Severino

## 2021-03-02 NOTE — DISCHARGE SUMMARY
Spine Discharge Summary    Patient ID:  Sherry Tariq  [id number]  male  50 y.o.  1975    Admit date: 2/25/2021    Discharge date: 3/2/2021    Admitting Physician: Abdias Fishman MD     Consulting Physician(s):   Treatment Team: Utilization Review: Elidia Lozano RN; Student Nurse: Charlotte Soliz; Staff Nurse: Donte Bryan RN; Care Manager: Maria Dolores Martinez    Date of Surgery:   2/25/2021     Preoperative Diagnosis:  LUMBAR STENOSIS AND INSTABILITY    Postoperative Diagnosis:   LUMBAR STENOSIS AND INSTABILITY    Procedure(s):  L4-5  LAMINECTOMY WITH LEFT L4-5 FACIECTOMY AND INSTRUMENTATED FUSION ELEVATE POSTERIOR LUMBAR INTERBODY FUSION PEDICLE SCREWS AND ALLOGRAFT(O-ARM)     Anesthesia Type:   General     Surgeon: Baldev Lutz MD                            HPI:  Mr. Vilma Velazquez is a 50year-old gentleman with severe refractory left L5 radiculopathy, with pain and limitations of activities of daily living . [facility] revealed deformity at L4-5. [facility] had a partially sacralized L5-S1.  There is lateral listhesis, facet hypertrophy on the left and scoliotic deformity at this level.  After discussing treatment options and risks of surgery, informed consent was obtained. Patient underwent complete L4-L5 laminectomy, left L4-5 facetectomy with instrumented fusion L4-5 on 2/25/21 following the failure of conservative management. PMH:   Past Medical History:   Diagnosis Date    Arthritis     back    Chronic pain     LOWER BACK       Body mass index is 46.26 kg/m². : A BMI > 30 is classified as obesity and > 40 is classified as morbid obesity. Medications upon admission :   Prior to Admission Medications   Prescriptions Last Dose Informant Patient Reported? Taking?   ascorbic acid, vitamin C, (Vitamin C) 250 mg tablet 2/18/2021 at Unknown time  Yes Yes   Sig: Take  by mouth daily. aspirin 81 mg chewable tablet 2/18/2021 at Unknown time  Yes Yes   Sig: Take 81 mg by mouth daily. cholecalciferol (Vitamin D3) (1000 Units /25 mcg) tablet 2021 at Unknown time  Yes Yes   Sig: Take 2,000 Units by mouth daily. cyclobenzaprine (FLEXERIL) 10 mg tablet 2021 at Unknown time  Yes Yes   Si mg five (5) times daily. diclofenac EC (VOLTAREN) 75 mg EC tablet 2021  Yes No   Sig: TK 1 T PO BID   gabapentin (NEURONTIN) 300 mg capsule 2021 at Unknown time  Yes Yes   Sig: Take 300 mg by mouth two (2) times a day. traMADoL (ULTRAM) 50 mg tablet 2021 at 2100  Yes Yes   Sig: Take 50 mg by mouth every eight (8) hours as needed for Pain. zinc 50 mg tab tablet 2021 at Unknown time  Yes Yes   Sig: Take  by mouth daily. Facility-Administered Medications: None        Allergies:  No Known Allergies     Hospital Course: The patient underwent surgery. Complications:  None; patient tolerated the procedure well. Was taken to the PACU in stable condition and then transferred to the ortho floor. Patient developed an ileus on POD#2; thisresolved prior to discharge with colace and milk of magnesia. Perioperative Antibiotics:  Ancef     Postoperative Pain Management:  Oxycodone & Tylenol     Postoperative transfusions:    Number of units banked PRBCs =   none     Post Op complications: none    Hemoglobin at discharge:    Lab Results   Component Value Date/Time    HGB 12.4 2021 03:03 AM       Dressing was changed daily while in the hospital.  Incision - clean, dry and intact. No significant erythema or swelling. Wound appears to be healing without any evidence of infection. Pt had a HVAC drain that was removed on POD# 1. Neurovascular exam found to be within normal limits. Physical Therapy started following surgery and participated in bed mobility, transfers and ambulation.         Gait:  Gait  Base of Support: Widened  Speed/Maggie: Slow  Step Length: Right shortened, Left shortened  Gait Abnormalities: Antalgic, Decreased step clearance  Ambulation - Level of Assistance: Contact guard assistance  Distance (ft): 350 Feet (ft)  Assistive Device: Brace/Splint, Gait belt, Walker, rolling  Rail Use: Both  Stairs - Level of Assistance: Modified independent  Number of Stairs Trained: 13                   Discharged to: Home. Condition on Discharge:   good    Discharge instructions:  - Take pain medications as prescribed  - Resume pre hospital diet      - Discharge activity: activity as tolerated  - Ambulate as tolerated  - Lumbar brace when oob  - Avoid bending, lifting and twisting  - Wound Care Keep wound clean and dry. See discharge instruction sheet. -DISCHARGE MEDICATION LIST     Discharge Medication List as of 3/2/2021  9:53 AM      START taking these medications    Details   oxyCODONE IR (ROXICODONE) 5 mg immediate release tablet Take 1 Tab by mouth every four (4) hours as needed for Pain for up to 14 days. Max Daily Amount: 30 mg. Indications: pain, Normal, Disp-50 Tab, R-0      docusate sodium (COLACE) 100 mg capsule Take 1 Cap by mouth two (2) times a day for 90 days. , Normal, Disp-60 Cap, R-2      naloxone (Narcan) 4 mg/actuation nasal spray Use 1 spray intranasally, then discard. Repeat with new spray every 2 min as needed for opioid overdose symptoms, alternating nostrils. , Normal, Disp-1 Each, R-0         CONTINUE these medications which have NOT CHANGED    Details   aspirin 81 mg chewable tablet Take 81 mg by mouth daily. , Historical Med      traMADoL (ULTRAM) 50 mg tablet Take 50 mg by mouth every eight (8) hours as needed for Pain., Historical Med      zinc 50 mg tab tablet Take  by mouth daily. , Historical Med      ascorbic acid, vitamin C, (Vitamin C) 250 mg tablet Take  by mouth daily. , Historical Med      cholecalciferol (Vitamin D3) (1000 Units /25 mcg) tablet Take 2,000 Units by mouth daily. , Historical Med      cyclobenzaprine (FLEXERIL) 10 mg tablet 5 mg five (5) times daily. , Historical Med      gabapentin (NEURONTIN) 300 mg capsule Take 300 mg by mouth two (2) times a day., Historical Med         STOP taking these medications       diclofenac EC (VOLTAREN) 75 mg EC tablet Comments:   Reason for Stopping:            per medical continuation form      -Follow up in office in 2 weeks      Signed:  FARHAT Pino     3/2/2021  12:14 PM

## 2021-03-02 NOTE — PROGRESS NOTES
Reason for Admission:   Post op Complete L4-L5 laminectomy, left L4-5 facetectomy with instrumented fusion L4-5                   RUR Score:     Observation                Plan for utilizing home health:      Referral pending with [facility]    PCP: First and Last name:  Dr. Shahrzad Delgado   Name of Practice: [facility]   Are you a current patient: Yes/No: yes   Approximate date of last visit: 9/25/21   Can you participate in a virtual visit with your PCP: Yes                    Current Advanced Directive/Advance Care Plan: Not on file    Healthcare Decision Maker:  Wife Bita Other [phone]  Click here to [address] including selection of the Healthcare Decision Maker Relationship (ie \"Primary\")                         Transition of Care Plan:      CM met with patient to inform of CM role and to assess needs. Patient verified demographic info-lives at home with wife and is independent. Patient does not use any DME. Preferred pharmacy is [facility]. Observation notice provided in writing to patient and/or caregiver as well as verbal explanation of the policy. Patients who are in outpatient status also receive the Observation notice. Lev Ponce, MS    10:18 [facility] can not accept patient's insurance. Referrals sent to [address] [place] [address]. CM to monitor. Lev Ponce, MS    11:35 [facility] has accepted patient for home health PT/OT, AVS updated and CM will notify patient.      Lev Ponce, MS

## 2021-03-02 NOTE — PROGRESS NOTES
Problem: Falls - Risk of  Goal: *Absence of Falls  Description: Document Green Salvia Fall Risk and appropriate interventions in the flowsheet.   Outcome: Progressing Towards Goal  Note: Fall Risk Interventions:  Mobility Interventions: Bed/chair exit alarm         Medication Interventions: Assess postural VS orthostatic hypotension    Elimination Interventions: Bed/chair exit alarm, Call light in reach

## 2021-03-03 ENCOUNTER — HOME CARE VISIT (OUTPATIENT)
Dept: SCHEDULING | Facility: HOME HEALTH | Age: 46
End: 2021-03-03
Payer: COMMERCIAL

## 2021-03-03 ENCOUNTER — PATIENT OUTREACH (OUTPATIENT)
Dept: CASE MANAGEMENT | Age: 46
End: 2021-03-03

## 2021-03-03 PROCEDURE — 400013 HH SOC

## 2021-03-03 PROCEDURE — G0151 HHCP-SERV OF PT,EA 15 MIN: HCPCS

## 2021-03-03 NOTE — PROGRESS NOTES
Patient contacted regarding recent discharge and COVID-19 risk. Discussed COVID-19 related testing which was available at this time. Test results were negative. Patient informed of results, if available? n/a    Outreach made within 2 business days of discharge: Yes      START taking these medications     Details   oxyCODONE IR (ROXICODONE) 5 mg immediate release tablet Take 1 Tab by mouth every four (4) hours as needed for Pain for up to 14 days. Max Daily Amount: 30 mg. Indications: pain, Normal, Disp-50 Tab, R-0       docusate sodium (COLACE) 100 mg capsule Take 1 Cap by mouth two (2) times a day for 90 days. , Normal, Disp-60 Cap, R-2       naloxone (Narcan) 4 mg/actuation nasal spray Use 1 spray intranasally, then discard. Repeat with new spray every 2 min as needed for opioid overdose symptoms, alternating nostrils. , Normal, Disp-1 Each, R-0     STOP taking these medications         diclofenac EC (VOLTAREN) 75 mg EC tablet Comments:   Reason for Stoppin/05/21  Multiple attempts made to reach patient - my chart message sent. Unable to reach patient for hospital f/u. Episode resolved at this time.  AR

## 2021-03-04 ENCOUNTER — HOME CARE VISIT (OUTPATIENT)
Dept: SCHEDULING | Facility: HOME HEALTH | Age: 46
End: 2021-03-04
Payer: COMMERCIAL

## 2021-03-04 VITALS
OXYGEN SATURATION: 96 % | DIASTOLIC BLOOD PRESSURE: 80 MMHG | HEART RATE: 97 BPM | SYSTOLIC BLOOD PRESSURE: 140 MMHG | RESPIRATION RATE: 16 BRPM | TEMPERATURE: 97 F

## 2021-03-04 PROCEDURE — G0152 HHCP-SERV OF OT,EA 15 MIN: HCPCS

## 2021-03-05 ENCOUNTER — HOME CARE VISIT (OUTPATIENT)
Dept: SCHEDULING | Facility: HOME HEALTH | Age: 46
End: 2021-03-05
Payer: COMMERCIAL

## 2021-03-05 PROCEDURE — G0151 HHCP-SERV OF PT,EA 15 MIN: HCPCS

## 2021-03-07 VITALS
TEMPERATURE: 97 F | HEART RATE: 100 BPM | RESPIRATION RATE: 16 BRPM | OXYGEN SATURATION: 97 % | SYSTOLIC BLOOD PRESSURE: 130 MMHG | DIASTOLIC BLOOD PRESSURE: 70 MMHG

## 2021-03-07 VITALS
OXYGEN SATURATION: 97 % | SYSTOLIC BLOOD PRESSURE: 128 MMHG | HEART RATE: 77 BPM | DIASTOLIC BLOOD PRESSURE: 70 MMHG | RESPIRATION RATE: 16 BRPM | TEMPERATURE: 97 F

## 2021-03-08 ENCOUNTER — HOME CARE VISIT (OUTPATIENT)
Dept: HOME HEALTH SERVICES | Facility: HOME HEALTH | Age: 46
End: 2021-03-08
Payer: COMMERCIAL

## 2021-03-08 ENCOUNTER — HOME CARE VISIT (OUTPATIENT)
Dept: SCHEDULING | Facility: HOME HEALTH | Age: 46
End: 2021-03-08
Payer: COMMERCIAL

## 2021-03-08 PROCEDURE — G0151 HHCP-SERV OF PT,EA 15 MIN: HCPCS

## 2021-03-09 VITALS
DIASTOLIC BLOOD PRESSURE: 70 MMHG | RESPIRATION RATE: 16 BRPM | HEART RATE: 100 BPM | SYSTOLIC BLOOD PRESSURE: 122 MMHG | TEMPERATURE: 97 F | OXYGEN SATURATION: 97 %

## 2021-04-14 ENCOUNTER — OFFICE VISIT (OUTPATIENT)
Dept: PRIMARY CARE CLINIC | Age: 46
End: 2021-04-14
Payer: COMMERCIAL

## 2021-04-14 VITALS
RESPIRATION RATE: 18 BRPM | OXYGEN SATURATION: 96 % | SYSTOLIC BLOOD PRESSURE: 132 MMHG | HEIGHT: 66 IN | DIASTOLIC BLOOD PRESSURE: 90 MMHG | BODY MASS INDEX: 44.2 KG/M2 | TEMPERATURE: 97.8 F | HEART RATE: 86 BPM | WEIGHT: 275 LBS

## 2021-04-14 DIAGNOSIS — Z98.890 S/P LUMBAR LAMINECTOMY: ICD-10-CM

## 2021-04-14 DIAGNOSIS — Z86.16 HISTORY OF COVID-19: ICD-10-CM

## 2021-04-14 DIAGNOSIS — Z00.00 PHYSICAL EXAM: Primary | ICD-10-CM

## 2021-04-14 DIAGNOSIS — I10 ESSENTIAL HYPERTENSION: ICD-10-CM

## 2021-04-14 DIAGNOSIS — E66.01 MORBIDLY OBESE (HCC): ICD-10-CM

## 2021-04-14 PROCEDURE — 99213 OFFICE O/P EST LOW 20 MIN: CPT | Performed by: INTERNAL MEDICINE

## 2021-04-14 PROCEDURE — 99396 PREV VISIT EST AGE 40-64: CPT | Performed by: INTERNAL MEDICINE

## 2021-04-14 RX ORDER — CYCLOBENZAPRINE HCL 5 MG
TABLET ORAL
COMMUNITY
Start: 2021-02-12 | End: 2021-07-09

## 2021-04-14 RX ORDER — ACETAMINOPHEN 325 MG/1
650 TABLET ORAL 2 TIMES DAILY
COMMUNITY

## 2021-04-14 RX ORDER — HYDROCHLOROTHIAZIDE 12.5 MG/1
12.5 TABLET ORAL DAILY
Qty: 30 TAB | Refills: 0 | Status: SHIPPED | OUTPATIENT
Start: 2021-04-14 | End: 2021-05-11

## 2021-04-14 NOTE — PROGRESS NOTES
Written by Brody Henderson, as dictated by Dr. Ramiro Mo MD.    Charlee Zimmerman (: 1975) is a 50 y.o. male, established patient, here for evaluation of the following chief complaint(s):  Physical     ASSESSMENT/PLAN:  1. Physical exam  Complete physical exam done. He has recently had labs done at Dr. Sally Jaeger office, waiting on results. 2. S/P lumbar laminectomy  He will check in with Dr. Zurdo DOUGHERTY again next week and she will give him permission to travel again, short trips only. They will also discuss starting outpatient PT. 3. Morbidly obese ([address]  He has been doing well with weight loss so far by walking daily for exercise. If HCTZ helps to bring his BP down, he may be able to start phentermine. 4. History of COVID-19  Stable at this time. 5. Essential hypertension  -     hydroCHLOROthiazide (HYDRODIURIL) 12.5 mg tablet; Take 1 Tab by mouth daily for 30 days. , Normal, Disp-30 Tab, R-0 sent to pharmacy. I started him on HCTZ for his fluid retention and leg swelling. He should check his BP and weight before and after starting HCTZ. Explained that he should come in for regular labs to monitor his kidneys while on this medication. SUBJECTIVE/OBJECTIVE:  HPI  The patient comes in today for a complete physical examination. Pt's BP is elevated today in office at 134/96, 132/90 on manual repeat in L arm while sitting down. He remembers his BP was 137/85 when it was last checked at Dr. Sally Jaeger office about 2 weeks ago. When he had his f/u at Dr. Zurdo Argueta office last Thursday it was around 135/84. He is s/p laminectomy surgery and had his 6-week follow up with Dr. Zurdo DOUGHERTY just recently. She will evaluate him again one more time before starting outpatient PT. He has been feeling better since having the surgery done. He is off of gabapentin and tramadol now, and he is taking tylenol and Flexeril 5 mg PRN for his pain.  Since his surgery he has gradually been increasing his walking exercise and is up to 20-30 minutes a day. Sometimes his back is still stiff but he is seeing progress from his regular physical activity. He continues wearing a support belt as instructed. He is following with a NP at Dr. Tawny Berger office for weight loss, and  he has lost weight from 286 lb on 02/25/21 to 275 lb today. She is not going to start him on phentermine until his BP normalizes, but she will be starting him on metformin. He had constipation from his pain medications following the surgery and had to be treated for this in the hospital. Now, he is doing much better and his bowel movements are regular. He has not started taking vitamin D again since his surgery. He got the first dose of the [employer] vaccine on 03/26/21 and his second dose will be on 04/16/21. Patient Active Problem List   Diagnosis Code    Obesity (BMI 30-39. 9) E66.9    Lumbar spine instability M53.2X6        Current Outpatient Medications on File Prior to Visit   Medication Sig Dispense Refill    cyclobenzaprine (FLEXERIL) 5 mg tablet TAKE 1 TO 2 TABLETS BY MOUTH UP TO THREE TIMES DAILY AS NEEDED      acetaminophen (TylenoL) 325 mg tablet Take  by mouth every four (4) hours as needed for Pain.  docusate sodium (COLACE) 100 mg capsule Take 1 Cap by mouth two (2) times a day for 90 days. 60 Cap 2    [DISCONTINUED] naloxone (Narcan) 4 mg/actuation nasal spray Use 1 spray intranasally, then discard. Repeat with new spray every 2 min as needed for opioid overdose symptoms, alternating nostrils. 1 Each 0    cholecalciferol (Vitamin D3) (1000 Units /25 mcg) tablet Take 2,000 Units by mouth daily.  [DISCONTINUED] aspirin 81 mg chewable tablet Take 81 mg by mouth daily.  [DISCONTINUED] traMADoL (ULTRAM) 50 mg tablet Take 50 mg by mouth every eight (8) hours as needed for Pain.  [DISCONTINUED] zinc 50 mg tab tablet Take  by mouth daily.       [DISCONTINUED] ascorbic acid, vitamin C, (Vitamin C) 250 mg tablet Take  by mouth daily.  [DISCONTINUED] gabapentin (NEURONTIN) 300 mg capsule Take 300 mg by mouth two (2) times a day. No current facility-administered medications on file prior to visit.         No Known Allergies    Past Medical History:   Diagnosis Date    Arthritis     back    Chronic pain     LOWER BACK       Past Surgical History:   Procedure Laterality Date    HX VASECTOMY      HX WISDOM TEETH EXTRACTION         Family History   Problem Relation Age of Onset    Dementia Mother     Hypertension Father     No Known Problems Sister     Diabetes Brother     No Known Problems Sister     No Known Problems Son     No Known Problems Son     No Known Problems Son     Anesth Problems Neg Hx        Social History     Socioeconomic History    Marital status:      Spouse name: Not on file    Number of children: Not on file    Years of education: Not on file    Highest education level: Not on file   Occupational History    Not on file   Social Needs    Financial resource strain: Not on file    Food insecurity     Worry: Not on file     Inability: Not on file    Transportation needs     Medical: Not on file     Non-medical: Not on file   Tobacco Use    Smoking status: Former Smoker     Packs/day: 1.00     Years: 10.00     Pack years: 10.00     Quit date:      Years since quittin.3    Smokeless tobacco: Former User     Quit date:    Substance and Sexual Activity    Alcohol use: Yes     Comment: socially; 8-12 beer/month    Drug use: Not Currently    Sexual activity: Yes     Partners: Female   Lifestyle    Physical activity     Days per week: Not on file     Minutes per session: Not on file    Stress: Not on file   Relationships    Social connections     Talks on phone: Not on file     Gets together: Not on file     Attends Jain service: Not on file     Active member of club or organization: Not on file     Attends meetings of clubs or organizations: Not on file     Relationship status: Not on file    Intimate partner violence     Fear of current or ex partner: Not on file     Emotionally abused: Not on file     Physically abused: Not on file     Forced sexual activity: Not on file   Other Topics Concern    Not on file   Social History Narrative    Not on file       Admission on 02/25/2021, Discharged on 03/02/2021   Component Date Value Ref Range Status    Glucose (POC) 02/25/2021 114* 65 - 100 mg/dL Final    Comment: (NOTE)  The Accu-Chek Inform II glucometer is not FDA cleared for critically   ill patient use. A study was performed validating the equivalence of   glucometer and clinical laboratory results on this patient   population. Despite the study, use of glucometers with capillary   specimens from critically ill patients, regardless of their location,   makes the test high complexity and requires the performing individual   to comply with CLIA requirements more stringent than those for waived   testing in the hospital setting. Critical thinking skills are   necessary to determine a potentially critically ill patients status   prior to using a glucometer.  Performed by 02/25/2021 ROHINI PHILIP   Final    HGB 02/26/2021 12.4  12.1 - 17.0 g/dL Final    INVESTIGATED PER DELTA CHECK PROTOCOL    HGB 02/27/2021 12.4  12.1 - 17.0 g/dL Final    Glucose (POC) 02/27/2021 147* 65 - 100 mg/dL Final    Comment: (NOTE)  The Accu-Chek Inform II glucometer is not FDA cleared for critically   ill patient use. A study was performed validating the equivalence of   glucometer and clinical laboratory results on this patient   population. Despite the study, use of glucometers with capillary   specimens from critically ill patients, regardless of their location,   makes the test high complexity and requires the performing individual   to comply with CLIA requirements more stringent than those for waived   testing in the hospital setting.  Critical thinking skills are   necessary to determine a potentially critically ill patients status   prior to using a glucometer.  Performed by 02/27/2021 Rosalina Ordonez  Outpatient Visit on 02/21/2021   Component Date Value Ref Range Status    SARS-CoV-2 02/21/2021 Not Detected  Not Detected   Final    Comment: (NOTE)  This nucleic acid amplification test was developed and its  performance characteristics determined by [facility]. Nucleic acid amplification tests include RT-PCR and TMA. This test  has not been FDA cleared or approved. This test has been authorized  by FDA under an Emergency Use Authorization (EUA). This test is only  authorized for the duration of time the declaration that  circumstances exist justifying the authorization of the emergency use  of in vitro diagnostic tests for detection of SARS-CoV-2 virus and/or  diagnosis of COVID-19 infection under section 564(b)(1) of the Act,  21 U. S.C. 913LKS-4(Q) (1), unless the authorization is terminated or  revoked sooner. When diagnostic testing is negative, the possibility of a false  negative result should be considered in the context of a patient's  recent exposures and the presence of clinical signs and symptoms  consistent with COVID-19. An individual without symptoms of COVID-  19 and who is not shedding SARS-CoV-2                            virus would expect to have a  negative (not detected) result in this assay. Performed At: [address] [id number]  Yany Madera MD VK:[id number]     Hospital Outpatient Visit on 02/19/2021   Component Date Value Ref Range Status    Special Requests: 02/19/2021 NO SPECIAL REQUESTS    Final    Culture result: 02/19/2021 MRSA NOT PRESENT    Final    Culture result: 02/19/2021 Screening of patient nares for MRSA is for surveillance purposes and, if positive, to facilitate isolation considerations in high risk settings.  It is not intended for automatic decolonization interventions per se as regimens are not sufficiently effective to warrant routine use. Final    WBC 02/19/2021 7.0  4.1 - 11.1 K/uL Final    RBC 02/19/2021 4.66  4.10 - 5.70 M/uL Final    HGB 02/19/2021 15.1  12.1 - 17.0 g/dL Final    HCT 02/19/2021 44.6  36.6 - 50.3 % Final    MCV 02/19/2021 95.7  80.0 - 99.0 FL Final    MCH 02/19/2021 32.4  26.0 - 34.0 PG Final    MCHC 02/19/2021 33.9  30.0 - 36.5 g/dL Final    RDW 02/19/2021 13.2  11.5 - 14.5 % Final    PLATELET 01/05/9300 444  150 - 400 K/uL Final    MPV 02/19/2021 9.2  8.9 - 12.9 FL Final    NRBC 02/19/2021 0.0  0  WBC Final    ABSOLUTE NRBC 02/19/2021 0.00  0.00 - 0.01 K/uL Final    NEUTROPHILS 02/19/2021 60  32 - 75 % Final    LYMPHOCYTES 02/19/2021 25  12 - 49 % Final    MONOCYTES 02/19/2021 11  5 - 13 % Final    EOSINOPHILS 02/19/2021 1  0 - 7 % Final    BASOPHILS 02/19/2021 1  0 - 1 % Final    IMMATURE GRANULOCYTES 02/19/2021 2* 0.0 - 0.5 % Final    ABS. NEUTROPHILS 02/19/2021 4.2  1.8 - 8.0 K/UL Final    ABS. LYMPHOCYTES 02/19/2021 1.8  0.8 - 3.5 K/UL Final    ABS. MONOCYTES 02/19/2021 0.7  0.0 - 1.0 K/UL Final    ABS. EOSINOPHILS 02/19/2021 0.1  0.0 - 0.4 K/UL Final    ABS. BASOPHILS 02/19/2021 0.1  0.0 - 0.1 K/UL Final    ABS. IMM. GRANS. 02/19/2021 0.1* 0.00 - 0.04 K/UL Final    DF 02/19/2021 AUTOMATED    Final     Review of Systems   Constitutional: Negative for activity change, fatigue and unexpected weight change. HENT: Negative for congestion, ear discharge, ear pain, hearing loss, rhinorrhea and sore throat. Eyes: Negative for pain, discharge and redness. Respiratory: Negative for cough, chest tightness and shortness of breath. Cardiovascular: Negative for leg swelling. Gastrointestinal: Negative for abdominal pain, constipation and diarrhea. Endocrine: Negative for polyuria. Genitourinary: Negative for dysuria, flank pain and urgency.    Musculoskeletal: Negative for arthralgias, back pain and myalgias. Skin: Negative for color change. Neurological: Negative for dizziness, light-headedness and headaches. Psychiatric/Behavioral: Negative for dysphoric mood and sleep disturbance. The patient is not nervous/anxious. Visit Vitals  BP (!) 132/90 (BP 1 Location: Left upper arm, BP Patient Position: Sitting)   Pulse 86   Temp 97.8 °F (36.6 °C) (Temporal)   Resp 18   Ht 5' 6\" (1.676 m)   Wt 275 lb (124.7 kg)   SpO2 96%   BMI 44.39 kg/m²     Physical Exam  Vitals signs and nursing note reviewed. Constitutional:       General: He is not in acute distress. Appearance: Normal appearance. He is well-developed. He is morbidly obese. He is not diaphoretic. Comments: +pt wearing support belt   HENT:      Head: Normocephalic and atraumatic. Right Ear: Tympanic membrane, ear canal and external ear normal.      Left Ear: Tympanic membrane, ear canal and external ear normal.      Mouth/Throat:      Mouth: Mucous membranes are moist.      Pharynx: Oropharynx is clear. Eyes:      General: No scleral icterus. Right eye: No discharge. Left eye: No discharge. Extraocular Movements: Extraocular movements intact. Conjunctiva/sclera: Conjunctivae normal.   Neck:      Musculoskeletal: Normal range of motion and neck supple. Thyroid: No thyromegaly. Cardiovascular:      Rate and Rhythm: Normal rate and regular rhythm. Pulses: Normal pulses. Dorsalis pedis pulses are 2+ on the right side and 2+ on the left side. Pulmonary:      Effort: Pulmonary effort is normal.      Breath sounds: Normal breath sounds. No wheezing. Abdominal:      General: Bowel sounds are normal. There is no distension. Palpations: Abdomen is soft. Tenderness: There is no abdominal tenderness. Musculoskeletal:      Right lower leg: Edema present. Left lower leg: Edema present.       Comments: B/L knees without crepitus   Lymphadenopathy:      Cervical: No cervical adenopathy. Neurological:      Deep Tendon Reflexes:      Reflex Scores:       Patellar reflexes are 2+ on the right side and 2+ on the left side. An electronic signature was used to authenticate this note.   -- Alba Sun

## 2021-05-21 ENCOUNTER — OFFICE VISIT (OUTPATIENT)
Dept: PRIMARY CARE CLINIC | Age: 46
End: 2021-05-21
Payer: COMMERCIAL

## 2021-05-21 VITALS
HEIGHT: 66 IN | BODY MASS INDEX: 42.59 KG/M2 | TEMPERATURE: 97.6 F | RESPIRATION RATE: 18 BRPM | WEIGHT: 265 LBS | DIASTOLIC BLOOD PRESSURE: 79 MMHG | OXYGEN SATURATION: 95 % | SYSTOLIC BLOOD PRESSURE: 112 MMHG | HEART RATE: 100 BPM

## 2021-05-21 DIAGNOSIS — R00.0 TACHYCARDIA: ICD-10-CM

## 2021-05-21 DIAGNOSIS — I10 ESSENTIAL HYPERTENSION: Primary | ICD-10-CM

## 2021-05-21 DIAGNOSIS — E66.01 MORBIDLY OBESE (HCC): ICD-10-CM

## 2021-05-21 PROCEDURE — 99213 OFFICE O/P EST LOW 20 MIN: CPT | Performed by: INTERNAL MEDICINE

## 2021-05-21 RX ORDER — METFORMIN HYDROCHLORIDE 500 MG/1
3 TABLET, EXTENDED RELEASE ORAL DAILY
COMMUNITY
Start: 2021-05-11 | End: 2022-05-06 | Stop reason: ALTCHOICE

## 2021-05-21 RX ORDER — LISINOPRIL 10 MG/1
1 TABLET ORAL DAILY
COMMUNITY
Start: 2021-05-07 | End: 2021-07-09 | Stop reason: ALTCHOICE

## 2021-05-21 NOTE — PROGRESS NOTES
Lora Caba (: 1975) is a 50 y.o. male, established patient, here for evaluation of the following chief complaint(s):  Elevated Blood Pressure     Written by Weston Or, as dictated by Dr. Leana Cross MD.      ASSESSMENT/PLAN:  Below is the assessment and plan developed based on review of pertinent history, physical exam, labs, studies, and medications. 1. Essential hypertension  Continue on lisinopril 10mg daily and HCTZ 12.5mg daily for HTN. Discussed that I am more concerned about his HR since his BP is good today in office, but his HR has been elevated at home as well as in office. 2. Tachycardia  Ordered a cardiac holter monitor for further evaluation. Explained to him longterm tachycardia can cause cardiomyopathy. -     CARDIAC HOLTER MONITOR; Future    3. Morbidly obese ([address]  He has been doing well with weight loss with Dr. Juan Jhaveri office. After his next visit at [facility] he will come to our office for weight  maintenance. SUBJECTIVE/OBJECTIVE:  HPI  He presents today to follow-up on HTN. He had an appt with neurosurgery and his BP was noted to be elevated at 148/98. He has also been noticing elevated BP readings in the past few weeks with recent readings around 140s/100. Most recently his BP was 128/89 with HR 97 and 133/81 with a HR of 93. In office, his BP is good at 112/79. He is currently on lisinopril 10mg daily and HCTZ 12.5mg daily. His HR today is elevated at 100. He does occ feel palpitations. He is following with a NP at Dr. Juan Jhaveri office for weight loss, and  he has lost weight from 286 lb on 21 to 275 lb today. He has lost weight from 275 lb on 21 to 265 lb today. He has been weight lifting for exercise and wants to start cardio in the near future.      Patient Active Problem List   Diagnosis Code    Lumbar spine instability M53.2X6        Current Outpatient Medications on File Prior to Visit   Medication Sig Dispense Refill    lisinopriL (PRINIVIL, ZESTRIL) 10 mg tablet Take 1 Tablet by mouth daily.  metFORMIN ER (GLUCOPHAGE XR) 500 mg tablet Take 3 Tablets by mouth daily.  hydroCHLOROthiazide (HYDRODIURIL) 12.5 mg tablet TAKE 1 TABLET BY MOUTH DAILY 90 Tab 0    acetaminophen (TylenoL) 325 mg tablet Take  by mouth every four (4) hours as needed for Pain.  cholecalciferol (Vitamin D3) (1000 Units /25 mcg) tablet Take 2,000 Units by mouth daily.  cyclobenzaprine (FLEXERIL) 5 mg tablet TAKE 1 TO 2 TABLETS BY MOUTH UP TO THREE TIMES DAILY AS NEEDED (Patient not taking: Reported on 2021)      docusate sodium (COLACE) 100 mg capsule Take 1 Cap by mouth two (2) times a day for 90 days. (Patient not taking: Reported on 2021) 60 Cap 2     No current facility-administered medications on file prior to visit.        No Known Allergies    Past Medical History:   Diagnosis Date    Arthritis     back    Chronic pain     LOWER BACK       Past Surgical History:   Procedure Laterality Date    HX VASECTOMY      HX WISDOM TEETH EXTRACTION         Family History   Problem Relation Age of Onset    Dementia Mother     Hypertension Father     No Known Problems Sister     Diabetes Brother     No Known Problems Sister     No Known Problems Son     No Known Problems Son     No Known Problems Son     Anesth Problems Neg Hx        Social History     Socioeconomic History    Marital status:      Spouse name: Not on file    Number of children: Not on file    Years of education: Not on file    Highest education level: Not on file   Occupational History    Not on file   Tobacco Use    Smoking status: Former Smoker     Packs/day: 1.00     Years: 10.00     Pack years: 10.00     Quit date:      Years since quittin.4    Smokeless tobacco: Former User     Quit date:    Vaping Use    Vaping Use: Never used   Substance and Sexual Activity    Alcohol use: Yes     Comment: socially; 8-12 beer/month    Drug use: Not Currently    Sexual activity: Yes     Partners: Female   Other Topics Concern    Not on file   Social History Narrative    Not on file     Social Determinants of Health     Financial Resource Strain:     Difficulty of Paying Living Expenses:    Food Insecurity:     Worried About Running Out of Food in the Last Year:     [address] in the Last Year:    Transportation Needs:     Lack of Transportation (Medical):  Lack of Transportation (Non-Medical):    Physical Activity:     Days of Exercise per Week:     Minutes of Exercise per Session:    Stress:     Feeling of Stress :    Social Connections:     Frequency of Communication with Friends and Family:     Frequency of Social Gatherings with Friends and Family:     Attends Holiness Services:     Active Member of Clubs or Organizations:     Attends Club or Organization Meetings:     Marital Status:    Intimate Partner Violence:     Fear of Current or Ex-Partner:     Emotionally Abused:     Physically Abused:     Sexually Abused:        Admission on 02/25/2021, Discharged on 03/02/2021   Component Date Value Ref Range Status    Glucose (POC) 02/25/2021 114* 65 - 100 mg/dL Final    Comment: (NOTE)  The Accu-Chek Inform II glucometer is not FDA cleared for critically   ill patient use. A study was performed validating the equivalence of   glucometer and clinical laboratory results on this patient   population. Despite the study, use of glucometers with capillary   specimens from critically ill patients, regardless of their location,   makes the test high complexity and requires the performing individual   to comply with CLIA requirements more stringent than those for waived   testing in the hospital setting. Critical thinking skills are   necessary to determine a potentially critically ill patients status   prior to using a glucometer.       Performed by 02/25/2021 ROHINI PHILIP   Final    HGB 02/26/2021 12.4  12.1 - 17.0 g/dL Final INVESTIGATED PER DELTA CHECK PROTOCOL    HGB 02/27/2021 12.4  12.1 - 17.0 g/dL Final    Glucose (POC) 02/27/2021 147* 65 - 100 mg/dL Final    Comment: (NOTE)  The Accu-Chek Inform II glucometer is not FDA cleared for critically   ill patient use. A study was performed validating the equivalence of   glucometer and clinical laboratory results on this patient   population. Despite the study, use of glucometers with capillary   specimens from critically ill patients, regardless of their location,   makes the test high complexity and requires the performing individual   to comply with CLIA requirements more stringent than those for waived   testing in the hospital setting. Critical thinking skills are   necessary to determine a potentially critically ill patients status   prior to using a glucometer.  Performed by 02/27/2021 [id number] Joselo Clarke   Final      Review of Systems   Constitutional: Negative for activity change, fatigue and unexpected weight change. HENT: Negative for congestion, ear discharge, ear pain, hearing loss, rhinorrhea and sore throat. Eyes: Negative for pain, discharge and redness. Respiratory: Negative for cough, chest tightness and shortness of breath. Cardiovascular: Positive for palpitations. Negative for leg swelling. Gastrointestinal: Negative for abdominal pain, constipation and diarrhea. Endocrine: Negative for polyuria. Genitourinary: Negative for dysuria, flank pain and urgency. Musculoskeletal: Negative for arthralgias, back pain and myalgias. Skin: Negative for color change. Neurological: Negative for dizziness, light-headedness and headaches. Psychiatric/Behavioral: Negative for dysphoric mood and sleep disturbance. The patient is not nervous/anxious.       Visit Vitals  /79 (BP 1 Location: Right arm, BP Patient Position: Sitting)   Pulse 100   Temp 97.6 °F (36.4 °C) (Temporal)   Resp 18   Ht 5' 6\" (1.676 m)   Wt 265 lb (120.2 kg)   SpO2 95% BMI 42.77 kg/m²      Physical Exam  Vitals and nursing note reviewed. Constitutional:       General: He is not in acute distress. Appearance: Normal appearance. He is well-developed. He is not diaphoretic. HENT:      Head: Normocephalic and atraumatic. Right Ear: External ear normal.      Left Ear: External ear normal.      Mouth/Throat:      Mouth: Mucous membranes are moist.      Pharynx: Oropharynx is clear. Eyes:      General: No scleral icterus. Right eye: No discharge. Left eye: No discharge. Extraocular Movements: Extraocular movements intact. Conjunctiva/sclera: Conjunctivae normal.      Pupils: Pupils are equal, round, and reactive to light. Cardiovascular:      Rate and Rhythm: Normal rate and regular rhythm. Pulses: Normal pulses. Heart sounds: Normal heart sounds. Pulmonary:      Effort: Pulmonary effort is normal.      Breath sounds: Normal breath sounds. No wheezing. Musculoskeletal:      Right lower leg: No edema. Left lower leg: No edema. Neurological:      Mental Status: He is alert and oriented to person, place, and time. Psychiatric:         Mood and Affect: Mood and affect normal.       An electronic signature was used to authenticate this note.   -- Georgia Cedeno

## 2021-07-09 ENCOUNTER — OFFICE VISIT (OUTPATIENT)
Dept: PRIMARY CARE CLINIC | Age: 46
End: 2021-07-09
Payer: COMMERCIAL

## 2021-07-09 VITALS
TEMPERATURE: 97.4 F | HEIGHT: 66 IN | BODY MASS INDEX: 43.39 KG/M2 | OXYGEN SATURATION: 95 % | DIASTOLIC BLOOD PRESSURE: 90 MMHG | RESPIRATION RATE: 18 BRPM | WEIGHT: 270 LBS | SYSTOLIC BLOOD PRESSURE: 132 MMHG | HEART RATE: 100 BPM

## 2021-07-09 DIAGNOSIS — E78.2 MIXED HYPERLIPIDEMIA: Primary | ICD-10-CM

## 2021-07-09 DIAGNOSIS — I10 ESSENTIAL HYPERTENSION: ICD-10-CM

## 2021-07-09 DIAGNOSIS — R73.02 IGT (IMPAIRED GLUCOSE TOLERANCE): ICD-10-CM

## 2021-07-09 DIAGNOSIS — E78.2 MIXED HYPERLIPIDEMIA: ICD-10-CM

## 2021-07-09 DIAGNOSIS — T46.4X5A COUGH DUE TO ACE INHIBITOR: Primary | ICD-10-CM

## 2021-07-09 DIAGNOSIS — E66.01 MORBIDLY OBESE (HCC): ICD-10-CM

## 2021-07-09 DIAGNOSIS — R05.8 COUGH DUE TO ACE INHIBITOR: Primary | ICD-10-CM

## 2021-07-09 DIAGNOSIS — Z11.59 NEED FOR HEPATITIS C SCREENING TEST: ICD-10-CM

## 2021-07-09 DIAGNOSIS — R00.0 TACHYCARDIA: ICD-10-CM

## 2021-07-09 DIAGNOSIS — Z12.11 COLON CANCER SCREENING: ICD-10-CM

## 2021-07-09 PROCEDURE — 99214 OFFICE O/P EST MOD 30 MIN: CPT | Performed by: INTERNAL MEDICINE

## 2021-07-09 RX ORDER — BENZONATATE 200 MG/1
200 CAPSULE ORAL
Qty: 21 CAPSULE | Refills: 0 | Status: SHIPPED | OUTPATIENT
Start: 2021-07-09 | End: 2021-07-16

## 2021-07-09 RX ORDER — LOSARTAN POTASSIUM AND HYDROCHLOROTHIAZIDE 12.5; 5 MG/1; MG/1
1 TABLET ORAL DAILY
Qty: 90 TABLET | Refills: 0 | Status: SHIPPED | OUTPATIENT
Start: 2021-07-09 | End: 2021-10-02

## 2021-07-09 NOTE — PROGRESS NOTES
Chief Complaint   Patient presents with    Cough     ongoing cough for approx 3 weeks.  thinks it may be lisinopril

## 2021-07-09 NOTE — PROGRESS NOTES
Wilfrid Segundo (: 1975) is a 48 y.o. male, established patient, here for evaluation of the following chief complaint(s):  Cough (ongoing cough for approx 3 weeks. thinks it may be lisinopril )   Written by Alexy Dacosta, as dictated by Dr. Kwaku Flores MD.      ASSESSMENT/PLAN:  Below is the assessment and plan developed based on review of pertinent history, physical exam, labs, studies, and medications. 1. Cough due to ACE inhibitor  D/c lisinopril and HCTZ. Prescribed losartan-HCTZ 50-12.5 mg. Take medication as prescribed. Prescribed Tessalon 200 mg. Take medication as prescribed. Potential side effects were discussed. If cough persists after 2-3 days, will order chest CT or xray. -     benzonatate (TESSALON) 200 mg capsule; Take 1 Capsule by mouth three (3) times daily as needed for Cough for up to 7 days. , Normal, Disp-21 Capsule, R-0 sent to pharmacy. 2. Essential hypertension  D/c lisinopril and HCTZ. Prescribed losartan-HCTZ 50-12.5 mg. Take medication as prescribed. Potential side effects were discussed. If cough persists after 2-3 days, will order chest CT or xray. I instructed pt to check his BP for 3 weeks, checking at different times of the day. he should keep a log of his readings and send it to me through [address] -     losartan-hydroCHLOROthiazide (HYZAAR) 50-12.5 mg per tablet; Take 1 Tablet by mouth daily for 90 days. , Normal, Disp-90 Tablet, R-0 sent to pharmacy. 3. Need for hepatitis C screening test  Ordered Hepatitis C test. Waiting for results.   -     HEPATITIS C AB; Future    4. Colon cancer screening  I ordered a stool test for health maintenance. -     OCCULT BLOOD IMMUNOASSAY,DIAGNOSTIC; Future    5. Mixed hyperlipidemia  Explained that hyperlipidemia will improve with diet and exercise. I gave him the number for  weight loss clinic so that he can attend a free orientation. 6. Morbidly obese ([address]  Advised pt to purchase a FitBit or similar device. Discussed that a healthy lifestyle requires 5,000-7,000 steps daily, but weight loss requires 10,000 steps daily. I gave him the number for our weight loss clinic so that he can attend a free orientation. 7. Tachycardia  Ordered a cardiac holter monitor for further evaluation. Explained to him longterm tachycardia can cause cardiomyopathy. -     CARDIAC HOLTER MONITOR; Future    SUBJECTIVE/OBJECTIVE:  HPI  Patient presents today c/o coughing for the past 3 weeks. His sxs started when he originally noticed that he had to clear his throat often. After traveling to [facility], he noticed an increase of coughing throughout the night and sx of congestion. The cough has worsened, and has become constant. He has tried Mucinex and Leana Murrain which originally worked, but no longer provides relief from [employer]. His BP today is 138/93, 132/90 manual repeat. He takes lisinipril and HCTZ for HTN, but did not take medication this morning. He does not check his BP often. His weight today is 270 lb, changed from 275 lb on 4/14/21. He is trying to lose weight, and is followed by Dr. Antonio Mullen. He is planning to start working out in the gym 2x/week. He c/o trouble walking s/p lumbar laminectomy. He was recovering well, but now feels that he cannot walk for long periods of time without pain. He will occassionally feel numbness from his R knee to R thigh. He has scheduled an appt with Dr. Nato Adamson in August, and will ask him for a referral to PT. Patient Active Problem List   Diagnosis Code    Lumbar spine instability M53.2X6        Current Outpatient Medications on File Prior to Visit   Medication Sig Dispense Refill    metFORMIN ER (GLUCOPHAGE XR) 500 mg tablet Take 3 Tablets by mouth daily.  acetaminophen (TylenoL) 325 mg tablet Take  by mouth every four (4) hours as needed for Pain.  cholecalciferol (Vitamin D3) (1000 Units /25 mcg) tablet Take 2,000 Units by mouth daily.       [DISCONTINUED] lisinopriL (PRINIVIL, ZESTRIL) 10 mg tablet Take 1 Tablet by mouth daily.  [DISCONTINUED] hydroCHLOROthiazide (HYDRODIURIL) 12.5 mg tablet TAKE 1 TABLET BY MOUTH DAILY 90 Tab 0    [DISCONTINUED] cyclobenzaprine (FLEXERIL) 5 mg tablet TAKE 1 TO 2 TABLETS BY MOUTH UP TO THREE TIMES DAILY AS NEEDED (Patient not taking: Reported on 2021)       No current facility-administered medications on file prior to visit.        No Known Allergies    Past Medical History:   Diagnosis Date    Arthritis     back    Chronic pain     LOWER BACK       Past Surgical History:   Procedure Laterality Date    HX VASECTOMY      HX WISDOM TEETH EXTRACTION         Family History   Problem Relation Age of Onset    Dementia Mother     Hypertension Father     No Known Problems Sister     Diabetes Brother     No Known Problems Sister     No Known Problems Son     No Known Problems Son     No Known Problems Son     Anesth Problems Neg Hx        Social History     Socioeconomic History    Marital status:      Spouse name: Not on file    Number of children: Not on file    Years of education: Not on file    Highest education level: Not on file   Occupational History    Not on file   Tobacco Use    Smoking status: Former Smoker     Packs/day: 1.00     Years: 10.00     Pack years: 10.00     Quit date:      Years since quittin.5    Smokeless tobacco: Former User     Quit date:    Vaping Use    Vaping Use: Never used   Substance and Sexual Activity    Alcohol use: Yes     Comment: socially; 8-12 beer/month    Drug use: Not Currently    Sexual activity: Yes     Partners: Female   Other Topics Concern    Not on file   Social History Narrative    Not on file     Social Determinants of Health     Financial Resource Strain:     Difficulty of Paying Living Expenses:    Food Insecurity:     Worried About Running Out of Food in the Last Year:     [address] in the Last Year:    Transportation Needs:     Lack of Transportation (Medical):  Lack of Transportation (Non-Medical):    Physical Activity:     Days of Exercise per Week:     Minutes of Exercise per Session:    Stress:     Feeling of Stress :    Social Connections:     Frequency of Communication with Friends and Family:     Frequency of Social Gatherings with Friends and Family:     Attends [employer] Services:     Active Member of Clubs or Organizations:     Attends Club or Organization Meetings:     Marital Status:    Intimate Partner Violence:     Fear of Current or Ex-Partner:     Emotionally Abused:     Physically Abused:     Sexually Abused:        No visits with results within 3 Month(s) from this visit. Latest known visit with results is:   Admission on 02/25/2021, Discharged on 03/02/2021   Component Date Value Ref Range Status    Glucose (POC) 02/25/2021 114* 65 - 100 mg/dL Final    Comment: (NOTE)  The Accu-Chek Inform II glucometer is not FDA cleared for critically   ill patient use. A study was performed validating the equivalence of   glucometer and clinical laboratory results on this patient   population. Despite the study, use of glucometers with capillary   specimens from critically ill patients, regardless of their location,   makes the test high complexity and requires the performing individual   to comply with CLIA requirements more stringent than those for waived   testing in the hospital setting. Critical thinking skills are   necessary to determine a potentially critically ill patients status   prior to using a glucometer.  Performed by 02/25/2021 ROHINI PHILIP   Final    HGB 02/26/2021 12.4  12.1 - 17.0 g/dL Final    INVESTIGATED PER DELTA CHECK PROTOCOL    HGB 02/27/2021 12.4  12.1 - 17.0 g/dL Final    Glucose (POC) 02/27/2021 147* 65 - 100 mg/dL Final    Comment: (NOTE)  The Accu-Chek Inform II glucometer is not FDA cleared for critically   ill patient use.  A study was performed validating the equivalence of glucometer and clinical laboratory results on this patient   population. Despite the study, use of glucometers with capillary   specimens from critically ill patients, regardless of their location,   makes the test high complexity and requires the performing individual   to comply with CLIA requirements more stringent than those for waived   testing in the hospital setting. Critical thinking skills are   necessary to determine a potentially critically ill patients status   prior to using a glucometer.  Performed by 02/27/2021 [id number] [employer]   Final      Review of Systems   Constitutional: Negative for activity change, fatigue and unexpected weight change. HENT: Negative for congestion, ear discharge, ear pain, hearing loss, rhinorrhea and sore throat. Eyes: Negative for pain, discharge and redness. Respiratory: Positive for cough. Negative for chest tightness and shortness of breath. Cardiovascular: Negative for leg swelling. Gastrointestinal: Negative for abdominal pain, constipation and diarrhea. Endocrine: Negative for polyuria. Genitourinary: Negative for dysuria, flank pain and urgency. Musculoskeletal: Positive for back pain. Negative for arthralgias and myalgias. Skin: Negative for color change. Neurological: Negative for dizziness, light-headedness and headaches. Psychiatric/Behavioral: Negative for dysphoric mood and sleep disturbance. The patient is not nervous/anxious. Visit Vitals  BP (!) 132/90 (BP 1 Location: Right arm)   Pulse 100   Temp 97.4 °F (36.3 °C) (Temporal)   Resp 18   Ht 5' 6\" (1.676 m)   Wt 270 lb (122.5 kg)   SpO2 95%   BMI 43.58 kg/m²      Physical Exam  Vitals and nursing note reviewed. Constitutional:       General: He is not in acute distress. Appearance: Normal appearance. He is well-developed. He is not diaphoretic. HENT:      Head: Normocephalic and atraumatic.       Right Ear: External ear normal.      Left Ear: External ear normal. Mouth/Throat:      Mouth: Mucous membranes are moist.      Pharynx: Oropharynx is clear. Eyes:      General: No scleral icterus. Right eye: No discharge. Left eye: No discharge. Extraocular Movements: Extraocular movements intact. Conjunctiva/sclera: Conjunctivae normal.      Pupils: Pupils are equal, round, and reactive to light. Cardiovascular:      Rate and Rhythm: Normal rate and regular rhythm. Pulses: Normal pulses. Heart sounds: Normal heart sounds. Pulmonary:      Effort: Pulmonary effort is normal.      Breath sounds: Normal breath sounds. No wheezing. Musculoskeletal:      Right lower leg: No edema. Left lower leg: No edema. Neurological:      Mental Status: He is alert and oriented to person, place, and time. Psychiatric:         Mood and Affect: Mood and affect normal.         An electronic signature was used to authenticate this note.   -- Brittney Medina

## 2021-07-14 DIAGNOSIS — J98.01 COUGH DUE TO BRONCHOSPASM: Primary | ICD-10-CM

## 2021-07-14 RX ORDER — METHYLPREDNISOLONE 4 MG/1
TABLET ORAL
Qty: 1 DOSE PACK | Refills: 0 | Status: SHIPPED | OUTPATIENT
Start: 2021-07-14 | End: 2022-05-06 | Stop reason: ALTCHOICE

## 2021-07-22 ENCOUNTER — HOSPITAL ENCOUNTER (OUTPATIENT)
Dept: NON INVASIVE DIAGNOSTICS | Age: 46
Discharge: HOME | End: 2021-07-22
Attending: INTERNAL MEDICINE
Payer: COMMERCIAL

## 2021-07-22 DIAGNOSIS — R00.0 TACHYCARDIA: ICD-10-CM

## 2021-07-22 PROCEDURE — 93225 XTRNL ECG REC<48 HRS REC: CPT

## 2021-07-27 PROCEDURE — 93227 XTRNL ECG REC<48 HR R&I: CPT | Performed by: SPECIALIST

## 2021-07-28 NOTE — PROGRESS NOTES
Results reviewed. Release via [employer], Hope you are doing well. Your Holter monitor report is back and they mentioned you had not submitted a diary. Overall , looks fine.  If you are still experiencing  palpitations I would recommend seeing a cardiologist.

## 2021-10-02 DIAGNOSIS — I10 ESSENTIAL HYPERTENSION: ICD-10-CM

## 2021-10-02 RX ORDER — LOSARTAN POTASSIUM AND HYDROCHLOROTHIAZIDE 12.5; 5 MG/1; MG/1
TABLET ORAL
Qty: 90 TABLET | Refills: 0 | Status: SHIPPED | OUTPATIENT
Start: 2021-10-02 | End: 2022-01-01

## 2021-12-31 DIAGNOSIS — I10 ESSENTIAL HYPERTENSION: ICD-10-CM

## 2022-01-01 RX ORDER — LOSARTAN POTASSIUM AND HYDROCHLOROTHIAZIDE 12.5; 5 MG/1; MG/1
TABLET ORAL
Qty: 90 TABLET | Refills: 0 | Status: SHIPPED | OUTPATIENT
Start: 2022-01-01 | End: 2022-03-31

## 2022-03-20 PROBLEM — M53.2X6 LUMBAR SPINE INSTABILITY: Status: ACTIVE | Noted: 2021-02-25

## 2022-03-21 NOTE — PERIOP NOTES
Updates patient's wife regarding status of procedure. pt c/o abdominal pain. vomited few days ago. diarrheax2 this morning. fever, tmax 100.8F today. pt is alert, awake and orientedx3. no pmh, IUTD. apical HR auscultated.

## 2022-03-25 ENCOUNTER — TRANSCRIBE ORDER (OUTPATIENT)
Dept: SCHEDULING | Age: 47
End: 2022-03-25

## 2022-03-25 DIAGNOSIS — M48.061 LUMBAR STENOSIS: Primary | ICD-10-CM

## 2022-03-31 DIAGNOSIS — I10 ESSENTIAL HYPERTENSION: ICD-10-CM

## 2022-03-31 RX ORDER — LOSARTAN POTASSIUM AND HYDROCHLOROTHIAZIDE 12.5; 5 MG/1; MG/1
TABLET ORAL
Qty: 90 TABLET | Refills: 0 | Status: SHIPPED | OUTPATIENT
Start: 2022-03-31 | End: 2022-06-29

## 2022-04-07 ENCOUNTER — HOSPITAL ENCOUNTER (OUTPATIENT)
Dept: MRI IMAGING | Age: 47
Discharge: HOME | End: 2022-04-07
Attending: NEUROLOGICAL SURGERY

## 2022-04-07 VITALS — BODY MASS INDEX: 42.44 KG/M2 | WEIGHT: 280 LBS | HEIGHT: 68 IN

## 2022-04-07 DIAGNOSIS — M48.061 LUMBAR STENOSIS: ICD-10-CM

## 2022-05-06 ENCOUNTER — HOSPITAL ENCOUNTER (OUTPATIENT)
Dept: MRI IMAGING | Age: 47
Discharge: HOME | End: 2022-05-06
Attending: NEUROLOGICAL SURGERY
Payer: COMMERCIAL

## 2022-05-06 ENCOUNTER — OFFICE VISIT (OUTPATIENT)
Dept: PRIMARY CARE CLINIC | Age: 47
End: 2022-05-06
Payer: COMMERCIAL

## 2022-05-06 VITALS
OXYGEN SATURATION: 95 % | WEIGHT: 288.8 LBS | HEART RATE: 95 BPM | DIASTOLIC BLOOD PRESSURE: 86 MMHG | RESPIRATION RATE: 15 BRPM | HEIGHT: 68 IN | BODY MASS INDEX: 43.77 KG/M2 | TEMPERATURE: 97.1 F | SYSTOLIC BLOOD PRESSURE: 119 MMHG

## 2022-05-06 VITALS — WEIGHT: 286 LBS | BODY MASS INDEX: 43.49 KG/M2

## 2022-05-06 DIAGNOSIS — Z12.11 COLON CANCER SCREENING: ICD-10-CM

## 2022-05-06 DIAGNOSIS — Z00.00 PHYSICAL EXAM: Primary | ICD-10-CM

## 2022-05-06 DIAGNOSIS — I10 PRIMARY HYPERTENSION: ICD-10-CM

## 2022-05-06 DIAGNOSIS — Z98.890 S/P LUMBAR DISCECTOMY: ICD-10-CM

## 2022-05-06 DIAGNOSIS — E66.01 MORBIDLY OBESE (HCC): ICD-10-CM

## 2022-05-06 PROCEDURE — 74011250636 HC RX REV CODE- 250/636: Performed by: NEUROLOGICAL SURGERY

## 2022-05-06 PROCEDURE — A9575 INJ GADOTERATE MEGLUMI 0.1ML: HCPCS | Performed by: NEUROLOGICAL SURGERY

## 2022-05-06 PROCEDURE — 99396 PREV VISIT EST AGE 40-64: CPT | Performed by: INTERNAL MEDICINE

## 2022-05-06 PROCEDURE — 72158 MRI LUMBAR SPINE W/O & W/DYE: CPT

## 2022-05-06 RX ORDER — GADOTERATE MEGLUMINE 376.9 MG/ML
20 INJECTION INTRAVENOUS
Status: COMPLETED | OUTPATIENT
Start: 2022-05-06 | End: 2022-05-06

## 2022-05-06 RX ORDER — METHOCARBAMOL 500 MG/1
TABLET, FILM COATED ORAL
COMMUNITY
Start: 2022-04-25

## 2022-05-06 RX ADMIN — GADOTERATE MEGLUMINE 20 ML: 376.9 INJECTION INTRAVENOUS at 13:03

## 2022-05-06 NOTE — PROGRESS NOTES
Tawanda Juan (: 1975) is a 48 y.o. male, established patient, here for evaluation of the following chief complaint(s):  Physical (hx back surgery, weight )     Written by Ratna Salcedo, as dictated by Dr. Tania Guerrero MD.      ASSESSMENT/PLAN:  Below is the assessment and plan developed based on review of pertinent history, physical exam, labs, studies, and medications. 1. Physical exam  Physical exam normal today. Ordered fasting labs for patient to complete today in office. Waiting on results. -     METABOLIC PANEL, COMPREHENSIVE; Future  -     CBC W/O DIFF; Future  -     LIPID PANEL; Future  -     TSH 3RD GENERATION; Future    2. S/P lumbar discectomy  Followed by Dr. Sandy Moore (neurosurgery). Continue with methocarbamol prn. 3. Primary hypertension  BP is well controlled on losartan-HCTZ 50-12.5 mg daily. Continue current medication(s). 4. Morbidly obese ([address]  Explained that he should be walking 5,000 steps daily to maintain conditioning and weight and increase to at least 10,000 steps to work on losing weight. Provided him with the information for our [address] [place] [address] weight loss program.     5. Colon cancer screening  Ordered FIT.   -     OCCULT BLOOD IMMUNOASSAY,DIAGNOSTIC; Future    SUBJECTIVE/OBJECTIVE:  HPI   The patient presents today for his annual physical exam. He is fasting today for blood work. His BP today is good at 119/86. He is on losartan-HCTZ 50-12.5 mg daily for HTN. He is s/p lumbar discectomy and was recovering well, but thinks he started resuming normal activity too quickly and his back pain flared up. He started PT for back pain in 2021. A couple months ago he started having back spasms which occasionally prevented him from moving. Also notes occasional numbness in his right anterior thigh. He has discussed this with Dr. Sandy Moore (neurosurgery) who started him on methocarbamol prn which has been helping.  He will have a follow-up MRI with  Rere Hamilton in the near future. He has been frustrated since he has not been losing weight despite watching his diet and cutting out beer. His exercise is somewhat limited due to back pain, but he has been trying to be as active as possible. He snores at night, but feels well rested when he wakes up in the AM.     He takes vitamin d3 supplement daily. Patient Active Problem List   Diagnosis Code    Lumbar spine instability M53.2X6    S/P lumbar discectomy Z98.890    Primary hypertension I10    Morbidly obese (HCC) E66.01        Current Outpatient Medications on File Prior to Visit   Medication Sig Dispense Refill    methocarbamoL (ROBAXIN) 500 mg tablet TAKE 2 TABLETS BY MOUTH EVERY 6 HOURS AS NEEDED      losartan-hydroCHLOROthiazide (HYZAAR) 50-12.5 mg per tablet TAKE 1 TABLET BY MOUTH DAILY 90 Tablet 0    acetaminophen (TylenoL) 325 mg tablet Take  by mouth every four (4) hours as needed for Pain.  cholecalciferol (Vitamin D3) (1000 Units /25 mcg) tablet Take 2,000 Units by mouth daily.  [DISCONTINUED] methylPREDNISolone (MEDROL DOSEPACK) 4 mg tablet As directed (Patient not taking: Reported on 5/6/2022) 1 Dose Pack 0    [DISCONTINUED] metFORMIN ER (GLUCOPHAGE XR) 500 mg tablet Take 3 Tablets by mouth daily. (Patient not taking: Reported on 5/6/2022)       No current facility-administered medications on file prior to visit.        No Known Allergies    Past Medical History:   Diagnosis Date    Arthritis     back    Chronic pain     LOWER BACK       Past Surgical History:   Procedure Laterality Date    HX VASECTOMY      HX WISDOM TEETH EXTRACTION         Family History   Problem Relation Age of Onset    Dementia Mother     Hypertension Father     No Known Problems Sister     Diabetes Brother     No Known Problems Sister     No Known Problems Son     No Known Problems Son     No Known Problems Son     Anesth Problems Neg Hx        Social History     Socioeconomic History    Marital status:      Spouse name: Not on file    Number of children: Not on file    Years of education: Not on file    Highest education level: Not on file   Occupational History    Not on file   Tobacco Use    Smoking status: Former Smoker     Packs/day: 1.00     Years: 10.00     Pack years: 10.00     Quit date: 44     Years since quittin.3    Smokeless tobacco: Former User     Quit date:    Vaping Use    Vaping Use: Never used   Substance and Sexual Activity    Alcohol use: Yes     Comment: socially; 8-12 beer/month    Drug use: Not Currently    Sexual activity: Yes     Partners: Female   Other Topics Concern    Not on file   Social History Narrative    Not on file       No visits with results within 3 Month(s) from this visit. Latest known visit with results is:   Orders Only on 2021   Component Date Value Ref Range Status    Hemoglobin A1c 2021 5.6  4.0 - 5.6 % Final    Comment: NEW METHOD PLEASE NOTE NEW REFERENCE RANGE  (NOTE)  HbA1C Interpretive Ranges  <5.7              Normal  5.7 - 6.4         Consider Prediabetes  >6.5              Consider Diabetes      Est. average glucose 2021 114  mg/dL Final    Cholesterol, total 2021 261* <200 MG/DL Final    Triglyceride 2021 214* <150 MG/DL Final    Comment: Based on NCEP-ATP III:  Triglycerides <150 mg/dL  is considered normal, 150-199  mg/dL  borderline high,  200-499 mg/dL high and  greater than or equal to 500  mg/dL very high.  HDL Cholesterol 2021 51  MG/DL Final    Comment: Based on NCEP ATP III, HDL Cholesterol <40 mg/dL is considered low and >60  mg/dL is elevated.       LDL, calculated 2021 167.2* 0 - 100 MG/DL Final    Comment: Based on the NCEP-ATP: LDL-C concentrations are considered  optimal <100 mg/dL,  near optimal/above Normal 100-129 mg/dL Borderline High: 130-159, High: 160-189  mg/dL Very High: Greater than or equal to 190 mg/dL      VLDL, calculated 2021 42.8  MG/DL Final    CHOL/HDL Ratio 07/09/2021 5.1* 0.0 - 5.0   Final    Sodium 07/09/2021 138  136 - 145 mmol/L Final    Potassium 07/09/2021 3.9  3.5 - 5.1 mmol/L Final    Chloride 07/09/2021 107  97 - 108 mmol/L Final    CO2 07/09/2021 24  21 - 32 mmol/L Final    Anion gap 07/09/2021 7  5 - 15 mmol/L Final    Glucose 07/09/2021 123* 65 - 100 mg/dL Final    BUN 07/09/2021 17  6 - 20 MG/DL Final    Creatinine 07/09/2021 0.84  0.70 - 1.30 MG/DL Final    BUN/Creatinine ratio 07/09/2021 20  12 - 20   Final    GFR est AA 07/09/2021 >60  >60 ml/min/1.73m2 Final    GFR est non-AA 07/09/2021 >60  >60 ml/min/1.73m2 Final    Comment: Estimated GFR is calculated using the IDMS-traceable Modification of Diet in  Renal Disease (MDRD) Study equation, reported for both  Americans  (GFRAA) and non- Americans (GFRNA), and normalized to 1.73m2 body  surface area. The physician must decide which value applies to the patient.  Calcium 07/09/2021 9.3  8.5 - 10.1 MG/DL Final    Bilirubin, total 07/09/2021 0.6  0.2 - 1.0 MG/DL Final    ALT (SGPT) 07/09/2021 53  12 - 78 U/L Final    AST (SGOT) 07/09/2021 30  15 - 37 U/L Final    Alk. phosphatase 07/09/2021 52  45 - 117 U/L Final    Protein, total 07/09/2021 7.5  6.4 - 8.2 g/dL Final    Albumin 07/09/2021 4.3  3.5 - 5.0 g/dL Final    Globulin 07/09/2021 3.2  2.0 - 4.0 g/dL Final    A-G Ratio 07/09/2021 1.3  1.1 - 2.2   Final    Hep C virus Ab Interp. 07/09/2021 NONREACTIVE  NONREACTIVE   Final    Hep C  virus Ab comment 07/09/2021 Method used is [employer]    Final      Review of Systems   Constitutional: Negative for activity change, fatigue and unexpected weight change. HENT: Negative for congestion, ear discharge, ear pain, hearing loss, rhinorrhea and sore throat. Eyes: Negative for pain, discharge and redness. Respiratory: Negative for cough, chest tightness and shortness of breath.     Cardiovascular: Negative for leg swelling. Gastrointestinal: Negative for abdominal pain, constipation and diarrhea. Endocrine: Negative for polyuria. Genitourinary: Negative for dysuria, flank pain and urgency. Musculoskeletal: Positive for back pain. Negative for arthralgias and myalgias. Skin: Negative for color change. Neurological: Negative for dizziness, light-headedness and headaches. Psychiatric/Behavioral: Negative for dysphoric mood and sleep disturbance. The patient is not nervous/anxious. Visit Vitals  /86 (BP 1 Location: Right arm)   Pulse 95   Temp 97.1 °F (36.2 °C)   Resp 15   Ht 5' 8\" (1.727 m)   Wt 288 lb 12.8 oz (131 kg)   SpO2 95%   BMI 43.91 kg/m²      Physical Exam  Vitals and nursing note reviewed. Constitutional:       General: He is not in acute distress. Appearance: Normal appearance. He is well-developed. He is morbidly obese. He is not diaphoretic. HENT:      Head: Normocephalic and atraumatic. Right Ear: Tympanic membrane, ear canal and external ear normal.      Left Ear: Tympanic membrane, ear canal and external ear normal.      Mouth/Throat:      Mouth: Mucous membranes are moist.      Pharynx: Oropharynx is clear. Eyes:      General: No scleral icterus. Right eye: No discharge. Left eye: No discharge. Extraocular Movements: Extraocular movements intact. Conjunctiva/sclera: Conjunctivae normal.   Neck:      Thyroid: No thyromegaly. Cardiovascular:      Rate and Rhythm: Normal rate and regular rhythm. Pulses: Normal pulses. Dorsalis pedis pulses are 2+ on the right side and 2+ on the left side. Pulmonary:      Effort: Pulmonary effort is normal.      Breath sounds: Normal breath sounds. No wheezing. Abdominal:      General: Bowel sounds are normal. There is no distension. Palpations: Abdomen is soft. Tenderness: There is no abdominal tenderness. Musculoskeletal:      Cervical back: Normal range of motion and neck supple. Right lower leg: No edema. Left lower leg: No edema. Comments: B/L knees without crepitus   Lymphadenopathy:      Cervical: No cervical adenopathy. Neurological:      Deep Tendon Reflexes:      Reflex Scores:       Patellar reflexes are 2+ on the right side and 2+ on the left side. An electronic signature was used to authenticate this note.   -- Gagandeep Robi

## 2022-05-06 NOTE — PROGRESS NOTES
Chief Complaint   Patient presents with    Physical     hx back surgery, weight        Visit Vitals  /86 (BP 1 Location: Right arm)   Pulse 95   Temp 97.1 °F (36.2 °C)   Resp 15   Ht 5' 8\" (1.727 m)   Wt 288 lb 12.8 oz (131 kg)   SpO2 95%   BMI 43.91 kg/m²       1. Have you been to the ER, urgent care clinic since your last visit? Hospitalized since your last visit? No    2. Have you seen or consulted any other health care providers outside of the [address] [place] [address] since your last visit? Include any pap smears or colon screening. Yes Dr Mirza Bates      3. For patients aged 39-70: Has the patient had a colonoscopy / FIT/ Cologuard?  No

## 2022-05-15 LAB
ALBUMIN SERPL-MCNC: 4.4 G/DL (ref 4–5)
ALBUMIN/GLOB SERPL: 1.7 {RATIO} (ref 1.2–2.2)
ALP SERPL-CCNC: 45 IU/L (ref 44–121)
ALT SERPL-CCNC: 43 IU/L (ref 0–44)
AST SERPL-CCNC: 39 IU/L (ref 0–40)
BILIRUB SERPL-MCNC: 0.4 MG/DL (ref 0–1.2)
BUN SERPL-MCNC: 13 MG/DL (ref 6–24)
BUN/CREAT SERPL: 13 (ref 9–20)
CALCIUM SERPL-MCNC: 9.5 MG/DL (ref 8.7–10.2)
CHLORIDE SERPL-SCNC: 102 MMOL/L (ref 96–106)
CHOLEST SERPL-MCNC: 238 MG/DL (ref 100–199)
CO2 SERPL-SCNC: 24 MMOL/L (ref 20–29)
CREAT SERPL-MCNC: 1.03 MG/DL (ref 0.76–1.27)
EGFR: 90 ML/MIN/1.73
ERYTHROCYTE [DISTWIDTH] IN BLOOD BY AUTOMATED COUNT: 13.2 % (ref 11.6–15.4)
GLOBULIN SER CALC-MCNC: 2.6 G/DL (ref 1.5–4.5)
GLUCOSE SERPL-MCNC: 106 MG/DL (ref 65–99)
HCT VFR BLD AUTO: 45.4 % (ref 37.5–51)
HDLC SERPL-MCNC: 45 MG/DL
HGB BLD-MCNC: 16 G/DL (ref 13–17.7)
LDLC SERPL CALC-MCNC: 168 MG/DL (ref 0–99)
MCH RBC QN AUTO: 32.7 PG (ref 26.6–33)
MCHC RBC AUTO-ENTMCNC: 35.2 G/DL (ref 31.5–35.7)
MCV RBC AUTO: 93 FL (ref 79–97)
PLATELET # BLD AUTO: 253 X10E3/UL (ref 150–450)
POTASSIUM SERPL-SCNC: 5.1 MMOL/L (ref 3.5–5.2)
PROT SERPL-MCNC: 7 G/DL (ref 6–8.5)
RBC # BLD AUTO: 4.89 X10E6/UL (ref 4.14–5.8)
SODIUM SERPL-SCNC: 141 MMOL/L (ref 134–144)
TRIGL SERPL-MCNC: 139 MG/DL (ref 0–149)
TSH SERPL DL<=0.005 MIU/L-ACNC: 2.09 UIU/ML (ref 0.45–4.5)
VLDLC SERPL CALC-MCNC: 25 MG/DL (ref 5–40)
WBC # BLD AUTO: 7.8 X10E3/UL (ref 3.4–10.8)

## 2022-05-15 NOTE — PROGRESS NOTES
Results reviewed. Release via [employer], Hope you are enjoying your weekend. Your blood report is back and overall numbers look fine except cholesterol came back high. Weight loss should improve these numbers. Are you planning to join weight loss clinic?

## 2022-05-25 DIAGNOSIS — Z76.89 ENCOUNTER FOR WEIGHT MANAGEMENT: Primary | ICD-10-CM

## 2022-05-25 DIAGNOSIS — E66.01 MORBID OBESITY (HCC): ICD-10-CM

## 2022-05-26 NOTE — PROGRESS NOTES
Patient was emailed the pre-recorded version of our VIRTUAL Medically Supervised Weight Loss New Patient Orientation where we discussed:  New Direction Very Low and the Low Calorie diet details  Medical Supervision  Nutrition education  Cost of Meal Replacements  Policies and compliance required for program enrollment.

## 2022-06-29 DIAGNOSIS — I10 ESSENTIAL HYPERTENSION: ICD-10-CM

## 2022-06-29 RX ORDER — LOSARTAN POTASSIUM AND HYDROCHLOROTHIAZIDE 12.5; 5 MG/1; MG/1
TABLET ORAL
Qty: 90 TABLET | Refills: 0 | Status: SHIPPED | OUTPATIENT
Start: 2022-06-29 | End: 2022-09-27

## 2022-07-05 ENCOUNTER — OFFICE VISIT (OUTPATIENT)
Dept: SURGERY | Age: 47
End: 2022-07-05
Payer: COMMERCIAL

## 2022-07-05 VITALS
TEMPERATURE: 98.1 F | OXYGEN SATURATION: 95 % | RESPIRATION RATE: 18 BRPM | SYSTOLIC BLOOD PRESSURE: 123 MMHG | WEIGHT: 292.8 LBS | HEART RATE: 91 BPM | DIASTOLIC BLOOD PRESSURE: 81 MMHG | HEIGHT: 68 IN | BODY MASS INDEX: 44.38 KG/M2

## 2022-07-05 DIAGNOSIS — R06.83 SNORING: ICD-10-CM

## 2022-07-05 DIAGNOSIS — R06.81 APNEA: ICD-10-CM

## 2022-07-05 DIAGNOSIS — Z13.1 SCREENING FOR DIABETES MELLITUS: ICD-10-CM

## 2022-07-05 DIAGNOSIS — Z13.6 SCREENING FOR ISCHEMIC HEART DISEASE: ICD-10-CM

## 2022-07-05 DIAGNOSIS — E66.01 CLASS 3 OBESITY (HCC): Primary | ICD-10-CM

## 2022-07-05 PROCEDURE — 99204 OFFICE O/P NEW MOD 45 MIN: CPT | Performed by: FAMILY MEDICINE

## 2022-07-05 RX ORDER — LORATADINE 10 MG/1
10 TABLET ORAL DAILY
COMMUNITY

## 2022-07-05 RX ORDER — BUPROPION HYDROCHLORIDE 100 MG/1
100 TABLET, EXTENDED RELEASE ORAL 2 TIMES DAILY
Qty: 60 TABLET | Refills: 2 | Status: SHIPPED | OUTPATIENT
Start: 2022-07-05 | End: 2022-09-27

## 2022-07-05 RX ORDER — IBUPROFEN 200 MG
400 TABLET ORAL
COMMUNITY

## 2022-07-05 NOTE — PROGRESS NOTES
[facility] Progress Note: Initial Physician Visit     Seda Campo is a 50 y.o. male who is here for medical screening for entering the [facility].   The patient denies any disease state that requires protein restriction. CC:  Class  3  Obesity    Referred by PCP reason referred to improve back pain and mobility    Weight History  I personally reviewed the Medical Screening Paris Damme completed by patient and scanned into media section of chart. It includes duration of their obesity, maximum weight, goal weight  all of which give the context of their obesity AND a Family History of their obesity. ww in the past and was successful while doing it      Weight loss History  I personally reviewed the Medical Screening Paris Damme completed by the patient and scanned into media section of chart. It includes number of weight loss attempts, the weight loss program that patients was most successful using, and if they have any hx of anorectic medication use, including OTC supplements. Significant Medical History  Past Medical History:   Diagnosis Date    Arthritis     back    Chronic pain     LOWER BACK     Patient's medicactions that may contribute: no    Plan to become pregant within 6 months   no    I personally reviewed the Medical Screening Paris Damme completed by the patient and scanned into media section of chart. This allows me to assess associated symptoms that are significant in the assessment of the patient's obesity and the patient's Past Medical History. Gained 30 lbs in one month after started gabapenting due to back pain. Lost some after surgery and then regained it when reinjured the back        Outpatient Medications Marked as Taking for the 7/5/22 encounter (Office Visit) with Rakesh Stack MD   Medication Sig Dispense Refill    loratadine (Claritin) 10 mg tablet Take 10 mg by mouth daily.       ibuprofen (Motrin IB) 200 mg tablet Take 400 mg by mouth. 400-800 mg daily      buPROPion SR (WELLBUTRIN SR) 100 mg SR tablet Take 1 Tablet by mouth two (2) times a day. 60 Tablet 2    losartan-hydroCHLOROthiazide (HYZAAR) 50-12.5 mg per tablet TAKE 1 TABLET BY MOUTH DAILY 90 Tablet 0    methocarbamoL (ROBAXIN) 500 mg tablet TAKE 2 TABLETS BY MOUTH EVERY 6 HOURS AS NEEDED      acetaminophen (TylenoL) 325 mg tablet Take 325 mg by mouth two (2) times a day.  cholecalciferol (Vitamin D3) (1000 Units /25 mcg) tablet Take 2,000 Units by mouth daily. AVG Hours SLEEP: 8-9 avg    GILSON    Never tested, he snores, unrefreshed by sleep, no daytime somnolence CPAP no known h/o gilson    Significant Psychosocial History   Has a doctor every diagnosed with Binge Eating Disorder, Bulemia or Anorexia? : no     Compliance  Upcoming Travel? no    Social History  Social History     Tobacco Use    Smoking status: Former Smoker     Packs/day: 1.00     Years: 10.00     Pack years: 10.00     Quit date:      Years since quittin.5    Smokeless tobacco: Former User     Quit date:    Substance Use Topics    Alcohol use: Yes     Comment: socially; 8-12 beer/month       Exercise  I personally reviewed the Medical Screening Tasha Grewal completed by the patient and scanned into media section of chart. MINUTES back surgery a year ago, reinjurd himself 6 months later. Now having limitations on movement. Aims for 7500 steps a day  Also does 4 days of 40-45 min exercise.  * and  4 times a week      Review of Systems  See HPI        Objective  Visit Vitals  /81 (BP 1 Location: Right arm, BP Patient Position: Sitting, BP Cuff Size: Adult long)   Pulse 91   Temp 98.1 °F (36.7 °C) (Oral)   Resp 18   Ht 5' 8\" (1.727 m)   Wt 292 lb 12.8 oz (132.8 kg)   SpO2 95%   BMI 44.52 kg/m²         Weight Metrics 2022   Weight - 292 lb 12.8 oz 286 lb 288 lb 12.8 oz 280 lb 270 lb 265 lb   Neck Circ (inches) 18 - - - - - -   Waist Measure Inches 51.5 - - - - - -   Body Fat % 37.7 - - - - - -   BMI - 44.52 kg/m2 43.49 kg/m2 43.91 kg/m2 42.57 kg/m2 43.58 kg/m2 42.77 kg/m2       Labs: See  labs scanned into Media section or in lab section of record      Physical Exam    Vital Signs Reviewed  Weight Management Metrics Reviewed    Appearance: well  HEENT:  Scleral icterus?  no  Neck:  Thyromegaly or nodules? no  Mouth: Large tongue not examined  Heart:  RRR  Lungs:  clear  Abdomen:     Hepatomegaly? no   Striae present? no  Skin:    Acne?  no   Hirsutism? no   Skin tags? no   Acanthosis Nigricans?  no  Ext:  Edema? no      Assessment & Plan  Encounter Diagnoses   Name Primary?  Class 3 obesity Yes    Apnea     Screening for diabetes mellitus     Snoring     Screening for ischemic heart disease        1. labs reviewed w/ patient  2. EKG reviewed w/ patient:   Personally reviewed by me, NSR, No abnormalities,    QTc = 381sec (Upper limit is 440 for males & 460 for females)      3. Medication changes include:  none  Diagnoses and all orders for this visit:    1. Class 3 obesity  -     METABOLIC PANEL, COMPREHENSIVE; Future  -     buPROPion SR (WELLBUTRIN SR) 100 mg SR tablet; Take 1 Tablet by mouth two (2) times a day. Start VLCD  Also start medication    2. Apnea  -     SLEEP MEDICINE REFERRAL  Has been witnessed by wife  He agreed to get sleep study  3. Screening for diabetes mellitus  -     HEMOGLOBIN A1C WITH EAG; Future    4. Snoring  -     SLEEP MEDICINE REFERRAL    5. Screening for ischemic heart disease  -     AMB POC EKG ROUTINE W/ 12 LEADS, INTER & REP        Based on his history, labs and EKG, Damaris Mars is  a good candidate for the [facility]      time with Claudia Bacon consisted of counseling & coordinating and/or discussing treatment plans in reference to his obesity The primary encounter diagnosis was Class 3 obesity.  Diagnoses of Apnea, Screening for diabetes mellitus, Snoring, and Screening for ischemic heart disease were also pertinent to this visit.

## 2022-07-05 NOTE — PROGRESS NOTES
New Patient. Weight Management. 1. Have you been to the ER, urgent care clinic since your last visit? Hospitalized since your last visit? No    2. Have you seen or consulted any other health care providers outside of the [address] [place] [address] since your last visit? Include any pap smears or colon screening.  No     BMI - 44.5

## 2022-07-08 ENCOUNTER — CLINICAL SUPPORT (OUTPATIENT)
Dept: SURGERY | Age: 47
End: 2022-07-08

## 2022-07-08 DIAGNOSIS — E66.01 CLASS 3 OBESITY (HCC): Primary | ICD-10-CM

## 2022-07-08 NOTE — PROGRESS NOTES
[address] [place] [address] Weight Management [place]  Metabolic Program Initial Nutrition Consult    Date: 2022   Physician: Rika Tariq MD  Name: Jermaine Guzmán  :  1975    Type of Plan: [facility]  Weeks on Plan: starting  after returning home from cruise  Virtual visit was completed through [address] [place] [address]. ASSESSMENT:      Medications/Supplements:   Prior to Admission medications    Medication Sig Start Date End Date Taking? Authorizing Provider   loratadine (Claritin) 10 mg tablet Take 10 mg by mouth daily. Provider, Historical   ibuprofen (Motrin IB) 200 mg tablet Take 400 mg by mouth. 400-800 mg daily    Provider, Historical   buPROPion SR (WELLBUTRIN SR) 100 mg SR tablet Take 1 Tablet by mouth two (2) times a day. 22   Merced Villalobos MD   losartan-hydroCHLOROthiazide [facility]) 50-12.5 mg per tablet TAKE 1 TABLET BY MOUTH DAILY 22   Addison Mejía MD   methocarbamoL (ROBAXIN) 500 mg tablet TAKE 2 TABLETS BY MOUTH EVERY 6 HOURS AS NEEDED 22   Provider, Historical   acetaminophen (TylenoL) 325 mg tablet Take 325 mg by mouth two (2) times a day. Provider, Historical   cholecalciferol (Vitamin D3) (1000 Units /25 mcg) tablet Take 2,000 Units by mouth daily. Provider, Historical     Anthropometrics:    Ht:68\"    Wt: 292#   IBW: 154#    %IBW: 189%    BMI:44    Category: Obesity Class 3    Pt presents today for initial nutrition consult for the [address] [place] [address] Weight [facility] Metabolic Program.      Exercise/Physical Activity: Started at 5000 steps per day, up to 9000 steps some days but has felt overly fatigued and sore from this. Average goal now is 7500 steps per day. Started meal replacements: no  If yes, how many per day: n/a    Aversions/side effects to meal replacements: n/a    Reported Diet History: week of the  plans to start. Coffee 2 cups per day. Eats all protein and vegetables. No meal skipping. No sodas or candy or \"food addiction. \"   Can avoid bread easily, avoids most carbs except pasta. Favorite meals are chicken, or other protein, with vegetables. Does eat cheese and crackers occasionally. Intermittent fasting 9p-10a. Main concern for weight gain was became sedentary after back surgery. Beverages: water, beer on weekends but plans to stop this once on program.    Environment/Psychosocial/Support:wife supportive, on LCD plan as well. NUTRITION INTERVENTION:  Pt educated on nutrition recommendations for VLCD, specifically 4 meal replacements every day totaling 800 calories, 100+ grams protein, 50 grams carbs, and remaining calories as healthy fats. Grocery meal:  Use the balanced plate method to plan meals, include 3-6 oz of lean source of protein, unlimited non-starchy vegetables, 1/2 cup whole grains/beans OR 1/2 cup fruit OR 1 serving of low fat dairy. Utilize handouts listing healthy snack and meal ideas. Read all nutrition labels. Demonstrated and emphasized identifying serving size, total fat, sugar and protein content. Defined low fat as </= 3 g per serving. Discussed lean and extra lean sources of protein. Provided list of low fat cooking methods. Avoid foods with sugar listed in the first 3 ingredients and >/10 g sugar per serving. Practice mindful eating habits; take small bites, chew thoroughly, avoid distractions, utilize hunger/fullness scale. Attend Metabolic Weight Loss Class and Support Group and increase physical activity (approved per MD) for long term weight maintenance. NUTRITION MONITORING AND EVALUATION: Reviewed VLCD guidelines, best tips, and safe use. Plans to start July 17th after cruise. Is excited about this approach to \"jump start\" weight loss. Reports he recently drank no beer, ate no pizza or refined carbs, ate clean, logged calories, and exercised and only lost 3 pounds in 6 weeks.     Discussed adding additional packet or 3-4 ounces lean protein on extra activity or light exercise days to prevent injury and preserve lean muscle. Pt motivated, amicable to suggestions. Followup one month. Specific tips and techniques to facilitate compliance with above recommendations were provided and discussed. If further details are desired please contact me at [phone]. This phone number was also provided to the patient for any further questions or concerns.           Mitchel Sparrow MS, RD, LDN

## 2022-07-18 ENCOUNTER — TELEPHONE (OUTPATIENT)
Dept: SLEEP MEDICINE | Age: 47
End: 2022-07-18

## 2022-07-18 NOTE — TELEPHONE ENCOUNTER
Called patient and lvm to schedule sleep consult, per Dr. Emmett Figueroa. Pt contacted APRN regarding left lateral eye blurriness but no field cut or loss of vision and second concern of dizziness when she moves her head very quickly. Asking if it could be related to aneurysms. She has a planned flow diverter placement for a left cavernous ICA aneurysm 9/19/18 and is s/p rt MCA stent assisted coiling and rt ICA coiling May 25, 2018.     After discussion of symptoms, explained that her symptoms are unlikely related to the aneurysm. She indicates she sometimes holds her amlodipine because her blood pressure is too low. She will start a log of her symptoms and BP for review with her PCP. Also recommended appt for eye exam with her optometrist to r/o any abnormalities that could contribute to vision change.

## 2022-07-22 ENCOUNTER — CLINICAL SUPPORT (OUTPATIENT)
Dept: SURGERY | Age: 47
End: 2022-07-22

## 2022-07-22 VITALS
WEIGHT: 285.8 LBS | RESPIRATION RATE: 18 BRPM | DIASTOLIC BLOOD PRESSURE: 76 MMHG | SYSTOLIC BLOOD PRESSURE: 114 MMHG | HEART RATE: 104 BPM | TEMPERATURE: 97.7 F | HEIGHT: 68 IN | BODY MASS INDEX: 43.32 KG/M2 | OXYGEN SATURATION: 95 %

## 2022-07-22 DIAGNOSIS — E66.01 CLASS 3 OBESITY (HCC): Primary | ICD-10-CM

## 2022-07-22 DIAGNOSIS — R06.81 APNEA: ICD-10-CM

## 2022-07-22 NOTE — PROGRESS NOTES
Weight Management. 1. Have you been to the ER, urgent care clinic since your last visit? Hospitalized since your last visit? No    2. Have you seen or consulted any other health care providers outside of the [address] [place] [address] since your last visit? Include any pap smears or colon screening. No      Progress Note: Weekly Education Class in the [facility] Loss Program         Patient is on Very Low Calorie Diet [x] (4 meal replacements per day, 800 kcal/day)      Low Calorie Diet [] (2-3 meal replacements per day, 7212-8806 kcal/day)    1) Did patient have any new symptoms or physical problems? Yes []    No []  NOT ANSWERED      If yes, check & comment: weakness [], fatigue [], lightheadedness [], headache [], cramps [], cold intolerance [], hair loss [], diarrhea [], constipation [],  NA [] other:                                 2) Has patient had any medical attention from other providers, urgent care or the emergency room this week? Yes []  No []NOT ANSWERED         NA [], If yes, why:                                       3) Any other sugar sweetened beverages consumed this week? Yes []  No []NOT ANSWERED      4) Did patient have any problems adhering to the diet? Yes []  No [] NA []NOT ANSWERED      If yes, Vacation [], Celebrations [], Conferences [], Family Reunions [] other:                                                5) How many hours of sleep this week? 8.5-9    (range)  NA []    Number of meal replacements consumed daily? 1-4  (range)  NA []    Average ounces of water patient consumed daily this week (not including shakes)? 85     (divide the weekly total by 7)    Did you eat any food outside of the program? Yes [] No [x]    Physical Activity Over the Past Week:    Cardio exercise: 0 min  Strength exercise: 0 workouts / week  Number of steps walked per day: 1592-1143    How has patient mood overall been this week?  Sad [], Happy [], Stressed [], Tired [], Content [], NA [], other  Several different moods             Medications reconciled by nurse Yes [x]  No[]    Patient was given therapeutic recommendations for any noted side effects of their dietary approach based upon [facility] patient manual per providers recommendation.

## 2022-07-25 NOTE — TELEPHONE ENCOUNTER
Called patient and spoke with wife regarding sleep consult, per Shwetha William. Per patients wife she will have him give us a call back because he works Monday through Friday in \[facility]\"".

## 2022-08-01 NOTE — PROGRESS NOTES
Nurse note from patient's weekly VLCD  class was reviewed. Pertinent medical concerns were:   reviewed     BP Readings from Last 3 Encounters:   07/22/22 114/76   07/05/22 123/81   05/06/22 119/86       Weight Metrics 7/22/2022 7/5/2022 7/5/2022 5/6/2022 5/6/2022 4/7/2022 7/9/2021   Weight 285 lb 12.8 oz - 292 lb 12.8 oz 286 lb 288 lb 12.8 oz 280 lb 270 lb   Neck Circ (inches) - 18 - - - - -   Waist Measure Inches - 51.5 - - - - -   Body Fat % - 37.7 - - - - -   BMI 43.46 kg/m2 - 44.52 kg/m2 43.49 kg/m2 43.91 kg/m2 42.57 kg/m2 43.58 kg/m2       Current Outpatient Medications   Medication Sig Dispense Refill    loratadine (CLARITIN) 10 mg tablet Take 10 mg by mouth daily. ibuprofen (MOTRIN) 200 mg tablet Take 400 mg by mouth. 400-800 mg daily      buPROPion SR (WELLBUTRIN SR) 100 mg SR tablet Take 1 Tablet by mouth two (2) times a day. 60 Tablet 2    losartan-hydroCHLOROthiazide (HYZAAR) 50-12.5 mg per tablet TAKE 1 TABLET BY MOUTH DAILY 90 Tablet 0    methocarbamoL (ROBAXIN) 500 mg tablet TAKE 2 TABLETS BY MOUTH EVERY 6 HOURS AS NEEDED      acetaminophen (TYLENOL) 325 mg tablet Take 325 mg by mouth two (2) times a day. cholecalciferol (VITAMIN D3) (1000 Units /25 mcg) tablet Take 2,000 Units by mouth daily.

## 2022-08-05 ENCOUNTER — CLINICAL SUPPORT (OUTPATIENT)
Dept: SURGERY | Age: 47
End: 2022-08-05

## 2022-08-05 VITALS
SYSTOLIC BLOOD PRESSURE: 117 MMHG | TEMPERATURE: 97.5 F | WEIGHT: 275.8 LBS | BODY MASS INDEX: 41.8 KG/M2 | DIASTOLIC BLOOD PRESSURE: 83 MMHG | HEIGHT: 68 IN | OXYGEN SATURATION: 96 % | RESPIRATION RATE: 18 BRPM | HEART RATE: 91 BPM

## 2022-08-05 DIAGNOSIS — E66.01 CLASS 3 OBESITY (HCC): Primary | ICD-10-CM

## 2022-08-05 NOTE — PROGRESS NOTES
Weight Management. 1. Have you been to the ER, urgent care clinic since your last visit? Hospitalized since your last visit? No    2. Have you seen or consulted any other health care providers outside of the [address] [place] [address] since your last visit? Include any pap smears or colon screening.  No

## 2022-08-05 NOTE — PROGRESS NOTES
7/28/2022    Progress Note: Weekly Education Class in the [facility]         Patient is on Very Low Calorie Diet [x] (4 meal replacements per day, 800 kcal/day)      Low Calorie Diet [] (2-3 meal replacements per day, 9082-9264 kcal/day)    1) Did patient have any new symptoms or physical problems? Yes []    No [x]    If yes, check & comment: weakness [], fatigue [], lightheadedness [], headache [], cramps [], cold intolerance [], hair loss [], diarrhea [], constipation [],  NA [] other:                                 2) Has patient had any medical attention from other providers, urgent care or the emergency room this week? Yes []  No [x]       NA [], If yes, why:                                       3) Any other sugar sweetened beverages consumed this week? Yes []  No [x]    4) Did patient have any problems adhering to the diet? Yes [x]  No [] NA []    If yes, Vacation [], Celebrations [], Conferences [], Family Reunions [] other:  Weird schedule 7/25-7/30                                              5) How many hours of sleep this week? 8.5-9.5    (range)  NA []    Number of meal replacements consumed daily? 2-5  (range)  NA []    Average ounces of water patient consumed daily this week (not including shakes)? 100     (divide the weekly total by 7)    Did you eat any food outside of the program? Yes [x] No []    Physical Activity Over the Past Week:    Cardio exercise: 0 min  Strength exercise: 0 workouts / week  Number of steps walked per day: 9553-4205    How has patient mood overall been this week? Sad [], Happy [], Stressed [], Tired [], Content [x], NA [], other            Medications reconciled by nurse Yes [x]  No[]    Patient was given therapeutic recommendations for any noted side effects of their dietary approach based upon [facility] patient manual per providers recommendation.      8/5/2022    Progress Note: Weekly Education Class in the [facility] Patient is on Very Low Calorie Diet [x] (4 meal replacements per day, 800 kcal/day)      Low Calorie Diet [] (2-3 meal replacements per day, 7306-5020 kcal/day)    1) Did patient have any new symptoms or physical problems? Yes []    No [x]    If yes, check & comment: weakness [], fatigue [], lightheadedness [], headache [], cramps [], cold intolerance [], hair loss [], diarrhea [], constipation [],  NA [] other:                                 2) Has patient had any medical attention from other providers, urgent care or the emergency room this week? Yes []  No [x]       NA [], If yes, why:                                       3) Any other sugar sweetened beverages consumed this week? Yes []  No [x]    4) Did patient have any problems adhering to the diet? Yes [x]  No [] NA []    If yes, Vacation [], Celebrations [], Conferences [], Family Reunions [] other: [employer] sw nuggets on  Tuesday, Work thing. 5) How many hours of sleep this week? 9    (range)  NA []    Number of meal replacements consumed daily? 2-4  (range)  NA []    Average ounces of water patient consumed daily this week (not including shakes)? 100     (divide the weekly total by 7)    Did you eat any food outside of the program? Yes [x] No []    Physical Activity Over the Past Week:    Cardio exercise: 0 min  Strength exercise: 0 workouts / week  Number of steps walked per day: 1984-3166    How has patient mood overall been this week? Sad [], Happy [], Stressed [], Tired [], Content [x], NA [], other            Medications reconciled by nurse Yes [x]  No[]    Patient was given therapeutic recommendations for any noted side effects of their dietary approach based upon [facility] patient manual per providers recommendation.

## 2022-08-09 ENCOUNTER — OFFICE VISIT (OUTPATIENT)
Dept: SURGERY | Age: 47
End: 2022-08-09
Payer: COMMERCIAL

## 2022-08-09 VITALS
RESPIRATION RATE: 18 BRPM | OXYGEN SATURATION: 95 % | HEART RATE: 98 BPM | WEIGHT: 269.9 LBS | BODY MASS INDEX: 40.9 KG/M2 | HEIGHT: 68 IN | SYSTOLIC BLOOD PRESSURE: 102 MMHG | TEMPERATURE: 98.2 F | DIASTOLIC BLOOD PRESSURE: 76 MMHG

## 2022-08-09 DIAGNOSIS — R06.81 APNEA: ICD-10-CM

## 2022-08-09 DIAGNOSIS — E66.01 CLASS 3 OBESITY (HCC): Primary | ICD-10-CM

## 2022-08-09 PROCEDURE — 99214 OFFICE O/P EST MOD 30 MIN: CPT | Performed by: FAMILY MEDICINE

## 2022-08-09 RX ORDER — DOCUSATE SODIUM 100 MG/1
200 CAPSULE, LIQUID FILLED ORAL EVERY OTHER DAY
COMMUNITY

## 2022-08-09 NOTE — PROGRESS NOTES
Weight Management. 1 month follow up. 1. Have you been to the ER, urgent care clinic since your last visit? Hospitalized since your last visit? No    2. Have you seen or consulted any other health care providers outside of the [address] [place] [address] since your last visit? Include any pap smears or colon screening. No    BMI - 41.0      Progress Note: Weekly Education Class in the [facility]         Patient is on Very Low Calorie Diet [x] (4 meal replacements per day, 800 kcal/day)      Low Calorie Diet [] (2-3 meal replacements per day, 2744-0481 kcal/day)    1) Did patient have any new symptoms or physical problems? Yes []    No [x]    If yes, check & comment: weakness [], fatigue [], lightheadedness [], headache [], cramps [], cold intolerance [], hair loss [], diarrhea [], constipation [],  NA [] other:                                 2) Has patient had any medical attention from other providers, urgent care or the emergency room this week? Yes []  No [x]       NA [], If yes, why:                                       3) Any other sugar sweetened beverages consumed this week? Yes []  No [x]    4) Did patient have any problems adhering to the diet? Yes []  No [x] NA []    If yes, Vacation [], Celebrations [], Conferences [], Family Reunions [] other:                                                5) How many hours of sleep this week? 9-11    (range)  NA []    Number of meal replacements consumed daily? 1-4  (range)  NA []    Average ounces of water patient consumed daily this week (not including shakes)? 64     (divide the weekly total by 7)    Did you eat any food outside of the program? Yes [x] No []    Physical Activity Over the Past Week:    Cardio exercise: 90 min  Strength exercise: 0 workouts / week  Number of steps walked per day: 0892-7117    How has patient mood overall been this week?  Sad [], Happy [], Stressed [], Tired [], Content [x], NA [], other Medications reconciled by nurse Yes [x]  No[]    Patient was given therapeutic recommendations for any noted side effects of their dietary approach based upon [facility] patient manual per providers recommendation.

## 2022-08-09 NOTE — PROGRESS NOTES
[facility] Progress Note:   F/up Physician Visit    CC: Weight Management      Seda Campo is a 50 y.o. male who is here for his  f/up physician visit for the VL  Program.    Weight Metrics 8/9/2022 8/9/2022 8/5/2022 7/22/2022 7/5/2022 7/5/2022 5/6/2022   Weight - 269 lb 14.4 oz 275 lb 12.8 oz 285 lb 12.8 oz - 292 lb 12.8 oz 286 lb   Neck Circ (inches) 17.75 - - - 18 - -   Waist Measure Inches 49.5 - - - 51.5 - -   Body Fat % 35.9 - - - 37.7 - -   BMI - 41.04 kg/m2 41.94 kg/m2 43.46 kg/m2 - 44.52 kg/m2 43.49 kg/m2         Outpatient Medications Marked as Taking for the 8/9/22 encounter (Office Visit) with Rakesh Stack MD   Medication Sig Dispense Refill    docusate sodium (COLACE) 100 mg capsule Take 200 mg by mouth every other day. loratadine (CLARITIN) 10 mg tablet Take 10 mg by mouth daily. ibuprofen (MOTRIN) 200 mg tablet Take 400 mg by mouth. 400-800 mg daily      buPROPion SR (WELLBUTRIN SR) 100 mg SR tablet Take 1 Tablet by mouth two (2) times a day. 60 Tablet 2    losartan-hydroCHLOROthiazide (HYZAAR) 50-12.5 mg per tablet TAKE 1 TABLET BY MOUTH DAILY 90 Tablet 0    methocarbamoL (ROBAXIN) 500 mg tablet TAKE 2 TABLETS BY MOUTH EVERY 6 HOURS AS NEEDED      acetaminophen (TYLENOL) 325 mg tablet Take 650 mg by mouth two (2) times a day. cholecalciferol (VITAMIN D3) (1000 Units /25 mcg) tablet Take 2,000 Units by mouth daily. Started 7/18 /22 after he went on vacation  The first week was hard but after that he has been feeling good  Has lost 13 lbs the last month      Participation   Did you attend clinic and class last week? no    Review of Systems  Since your last visit, have you experienced any complications? no  If yes, please list:       Are you taking an appetite suppressant? yes  If so, is there any Chest Pain, Palpitations or Dizziness?       HUNGER CONTROL: good    BP Readings from Last 3 Encounters:   08/09/22 102/76   08/05/22 117/83   07/22/22 114/76       SLEEP:9-10, wife has witnessed apnea    Have you received any other medical care this week? no  If yes, where and for what? Have you discontinued or changed any medicine or dose of your medicine since your last visit with Dr Hyacinth Dorsey? no  If yes, where and for what? Diet  How many ounces of calorie-free liquids did you consume each day?  100 oz    How many meal replacements did you take each day? 3-4    Did you have any problems adhering to the program?  no If yes, please explain:      Exercise  Aerobic exercise: started waking 4 days ago walking in neighborhood min  Resistance exercise: 0 workouts / week  Any discomfort?  no     If yes, where? Objective  Visit Vitals  /76 (BP 1 Location: Right arm, BP Patient Position: Sitting, BP Cuff Size: Large adult)   Pulse 98   Temp 98.2 °F (36.8 °C) (Oral)   Resp 18   Ht 5' 8\" (1.727 m)   Wt 269 lb 14.4 oz (122.4 kg)   SpO2 95%   BMI 41.04 kg/m²     No LMP for male patient. Physical Exam  Appearance: well,   Mental:A&O x 3, NAD  H:NC/AT,  EENT:   EOMI, PERRL, No scleral icterus  Neck: no bruit or JVD  Lung: clear, No W/R  ABD: soft, active, nontender  Ext:  no Edema  Neuro: nonfocal  Assessment / Plan    Encounter Diagnoses   Name Primary? Class 3 obesity Yes    Apnea      Diagnoses and all orders for this visit:    1. Class 3 obesity  Start wellbutrin   Cont VLCD  Recheck 1 month  2. Apnea  Still Needs testing for rema  1. Weight management improved   Progress was reviewed with patient    2. Labs    Latest results reviewed with patient       face to face time with Will Gruber consisted of counseling & coordinating and/or discussing treatment plans in reference to his obesity The primary encounter diagnosis was Class 3 obesity. A diagnosis of Apnea was also pertinent to this visit.

## 2022-08-15 LAB
ALBUMIN SERPL-MCNC: 4.7 G/DL (ref 4–5)
ALBUMIN/GLOB SERPL: 1.8 {RATIO} (ref 1.2–2.2)
ALP SERPL-CCNC: 53 IU/L (ref 44–121)
ALT SERPL-CCNC: 50 IU/L (ref 0–44)
AST SERPL-CCNC: 38 IU/L (ref 0–40)
BILIRUB SERPL-MCNC: 0.7 MG/DL (ref 0–1.2)
BUN SERPL-MCNC: 14 MG/DL (ref 6–24)
BUN/CREAT SERPL: 13 (ref 9–20)
CALCIUM SERPL-MCNC: 9.7 MG/DL (ref 8.7–10.2)
CHLORIDE SERPL-SCNC: 98 MMOL/L (ref 96–106)
CO2 SERPL-SCNC: 20 MMOL/L (ref 20–29)
CREAT SERPL-MCNC: 1.07 MG/DL (ref 0.76–1.27)
EGFR: 85 ML/MIN/1.73
GLOBULIN SER CALC-MCNC: 2.6 G/DL (ref 1.5–4.5)
GLUCOSE SERPL-MCNC: 103 MG/DL (ref 65–99)
HBA1C MFR BLD: 5.5 % (ref 4.8–5.6)
POTASSIUM SERPL-SCNC: 3.9 MMOL/L (ref 3.5–5.2)
PROT SERPL-MCNC: 7.3 G/DL (ref 6–8.5)
SODIUM SERPL-SCNC: 136 MMOL/L (ref 134–144)

## 2022-08-19 ENCOUNTER — CLINICAL SUPPORT (OUTPATIENT)
Dept: SURGERY | Age: 47
End: 2022-08-19

## 2022-08-19 VITALS
WEIGHT: 266.5 LBS | RESPIRATION RATE: 20 BRPM | SYSTOLIC BLOOD PRESSURE: 111 MMHG | DIASTOLIC BLOOD PRESSURE: 80 MMHG | HEART RATE: 109 BPM | BODY MASS INDEX: 40.39 KG/M2 | TEMPERATURE: 98.6 F | HEIGHT: 68 IN | OXYGEN SATURATION: 95 %

## 2022-08-19 DIAGNOSIS — E66.01 CLASS 3 OBESITY (HCC): Primary | ICD-10-CM

## 2022-08-19 NOTE — PROGRESS NOTES
Progress Note: Weekly Education Class in the [facility]         Patient is on Very Low Calorie Diet [x] (4 meal replacements per day, 800 kcal/day)      Low Calorie Diet [] (2-3 meal replacements per day, 3085-5405 kcal/day)    1) Did patient have any new symptoms or physical problems? Yes []    No [x]    If yes, check & comment: weakness [], fatigue [], lightheadedness [], headache [], cramps [], cold intolerance [], hair loss [], diarrhea [], constipation [],  NA [] other:                                 2) Has patient had any medical attention from other providers, urgent care or the emergency room this week? Yes []  No [x]       NA [], If yes, why:                                       3) Any other sugar sweetened beverages consumed this week? Yes []  No [x]    4) Did patient have any problems adhering to the diet? Yes [x]  No [] NA []    If yes, Vacation [], Celebrations [], Conferences [], Family Reunions [] other: 08/12 weight = 267.0/ 29.3 weight loss (10%) pizza Saturday, chips for snack pasta on Sunday and cheese and crackers snack                                               5) How many hours of sleep this week? 8.5-9    (range)  NA []    Number of meal replacements consumed daily? 0-4 (range)  NA []    Average ounces of water patient consumed daily this week (not including shakes)? 100     (divide the weekly total by 7)    Did you eat any food outside of the program? Yes [x] No []    Physical Activity Over the Past Week:    Cardio exercise: n/a min  Strength exercise: n/a workouts / week  Number of steps walked per day: 9984-7109    How has patient mood overall been this week? Sad [], Happy [x], Stressed [], Tired [], Content [x], NA [], other            Medications reconciled by nurse Yes [x]  No[]    Patient was given therapeutic recommendations for any noted side effects of their dietary approach based upon [facility] patient manual per providers recommendation. Progress Note: Weekly Education Class in the [facility]         Patient is on Very Low Calorie Diet [x] (4 meal replacements per day, 800 kcal/day)      Low Calorie Diet [] (2-3 meal replacements per day, 9672-3993 kcal/day)    1) Did patient have any new symptoms or physical problems? Yes []    No [x]    If yes, check & comment: weakness [], fatigue [], lightheadedness [], headache [], cramps [], cold intolerance [], hair loss [], diarrhea [], constipation [],  NA [] other:                                 2) Has patient had any medical attention from other providers, urgent care or the emergency room this week? Yes []  No [x]       NA [], If yes, why:                                       3) Any other sugar sweetened beverages consumed this week? Yes []  No [x]    4) Did patient have any problems adhering to the diet? Yes []  No [x] NA []    If yes, Vacation [], Celebrations [], Conferences [], Family Reunions [] other:                                                5) How many hours of sleep this week? 8-9    (range)  NA []    Number of meal replacements consumed daily? 0-4 (range)  NA []    Average ounces of water patient consumed daily this week (not including shakes)? 101     (divide the weekly total by 7)    Did you eat any food outside of the program? Yes [x] No []    Physical Activity Over the Past Week:    Cardio exercise:  116 min  Strength exercise:  n/a workouts / week  Number of steps walked per day: 3454-28527    How has patient mood overall been this week? Sad [], Happy [], Stressed [], Tired [x], Content [x], NA [], other  positive          Medications reconciled by nurse Yes [x]  No[]    Patient was given therapeutic recommendations for any noted side effects of their dietary approach based upon [facility] patient manual per providers recommendation.

## 2022-08-19 NOTE — PROGRESS NOTES
1. Have you been to the ER, urgent care clinic since your last visit? Hospitalized since your last visit? No    2. Have you seen or consulted any other health care providers outside of the [address] [place] [address] since your last visit? Include any pap smears or colon screening.  No

## 2022-08-21 NOTE — PROGRESS NOTES
The blood sugar test is normal  The kidney tests is normal  One of The liver tests is not normal. This is likely a result of fatty infiltration of the liver. The weight management process of limiting calories and exercise will likely normalize this.  I will recheck it in 1 month

## 2022-09-02 NOTE — PROGRESS NOTES
Nurse note from patient's weekly VLCD / class was reviewed. Pertinent medical concerns were:   reviewed     BP Readings from Last 3 Encounters:   08/19/22 111/80   08/09/22 102/76   08/05/22 117/83       Weight Metrics 8/19/2022 8/9/2022 8/9/2022 8/5/2022 7/22/2022 7/5/2022 7/5/2022   Weight 266 lb 8 oz - 269 lb 14.4 oz 275 lb 12.8 oz 285 lb 12.8 oz - 292 lb 12.8 oz   Neck Circ (inches) - 17.75 - - - 18 -   Waist Measure Inches - 49.5 - - - 51.5 -   Body Fat % - 35.9 - - - 37.7 -   BMI 40.52 kg/m2 - 41.04 kg/m2 41.94 kg/m2 43.46 kg/m2 - 44.52 kg/m2       Current Outpatient Medications   Medication Sig Dispense Refill    docusate sodium (COLACE) 100 mg capsule Take 200 mg by mouth every other day. loratadine (CLARITIN) 10 mg tablet Take 10 mg by mouth daily. ibuprofen (MOTRIN) 200 mg tablet Take 400 mg by mouth. 400-800 mg daily      buPROPion SR (WELLBUTRIN SR) 100 mg SR tablet Take 1 Tablet by mouth two (2) times a day. 60 Tablet 2    losartan-hydroCHLOROthiazide (HYZAAR) 50-12.5 mg per tablet TAKE 1 TABLET BY MOUTH DAILY 90 Tablet 0    methocarbamoL (ROBAXIN) 500 mg tablet TAKE 2 TABLETS BY MOUTH EVERY 6 HOURS AS NEEDED      acetaminophen (TYLENOL) 325 mg tablet Take 650 mg by mouth two (2) times a day. cholecalciferol (VITAMIN D3) (1000 Units /25 mcg) tablet Take 2,000 Units by mouth daily.

## 2022-09-02 NOTE — PROGRESS NOTES
Nurse note from patient's weekly VLCD / class was reviewed. Pertinent medical concerns were:   reviewed     BP Readings from Last 3 Encounters:   08/19/22 111/80   08/09/22 102/76   08/05/22 117/83       Weight Metrics 8/19/2022 8/9/2022 8/9/2022 8/5/2022 7/22/2022 7/5/2022 7/5/2022   Weight 266 lb 8 oz - 269 lb 14.4 oz 275 lb 12.8 oz 285 lb 12.8 oz - 292 lb 12.8 oz   Neck Circ (inches) - 17.75 - - - 18 -   Waist Measure Inches - 49.5 - - - 51.5 -   Body Fat % - 35.9 - - - 37.7 -   BMI 40.52 kg/m2 - 41.04 kg/m2 41.94 kg/m2 43.46 kg/m2 - 44.52 kg/m2       Current Outpatient Medications   Medication Sig Dispense Refill    loratadine (CLARITIN) 10 mg tablet Take 10 mg by mouth daily. ibuprofen (MOTRIN) 200 mg tablet Take 400 mg by mouth. 400-800 mg daily      buPROPion SR (WELLBUTRIN SR) 100 mg SR tablet Take 1 Tablet by mouth two (2) times a day. 60 Tablet 2    losartan-hydroCHLOROthiazide (HYZAAR) 50-12.5 mg per tablet TAKE 1 TABLET BY MOUTH DAILY 90 Tablet 0    methocarbamoL (ROBAXIN) 500 mg tablet TAKE 2 TABLETS BY MOUTH EVERY 6 HOURS AS NEEDED      acetaminophen (TYLENOL) 325 mg tablet Take 650 mg by mouth two (2) times a day. cholecalciferol (VITAMIN D3) (1000 Units /25 mcg) tablet Take 2,000 Units by mouth daily. docusate sodium (COLACE) 100 mg capsule Take 200 mg by mouth every other day.

## 2022-09-15 ENCOUNTER — OFFICE VISIT (OUTPATIENT)
Dept: SURGERY | Age: 47
End: 2022-09-15
Payer: COMMERCIAL

## 2022-09-15 VITALS
HEIGHT: 68 IN | RESPIRATION RATE: 18 BRPM | DIASTOLIC BLOOD PRESSURE: 81 MMHG | WEIGHT: 266.7 LBS | OXYGEN SATURATION: 96 % | SYSTOLIC BLOOD PRESSURE: 117 MMHG | HEART RATE: 76 BPM | TEMPERATURE: 97.9 F | BODY MASS INDEX: 40.42 KG/M2

## 2022-09-15 DIAGNOSIS — I10 PRIMARY HYPERTENSION: ICD-10-CM

## 2022-09-15 DIAGNOSIS — E66.01 MORBIDLY OBESE (HCC): Primary | ICD-10-CM

## 2022-09-15 DIAGNOSIS — M51.36 LUMBAR DEGENERATIVE DISC DISEASE: ICD-10-CM

## 2022-09-15 PROCEDURE — 99215 OFFICE O/P EST HI 40 MIN: CPT | Performed by: INTERNAL MEDICINE

## 2022-09-15 NOTE — PROGRESS NOTES
[facility]   [facility]     FOLLOW-UP PHYSICIAN VISIT    HISTORY OF PRESENT ILLNESS    Sammy Tan is a 50 y.o. male with *** Obesity There is no height or weight on file to calculate BMI. and associated health concerns presents for follow-up of weight management. ***      WEIGHT HISTORY    Current weight ***. Weight loss since previous visit ***. Total weight loss ***. Short term goal weight ***. Long term goal weight ***. Neck circumference ***. Waist circumference ***. Body fat ***. Current BMI ***. CURRENT HABITS    Eating/Drinking Habits:  Meals per day/snacking: ***  Drinking Habits: ***    Sleep Habits: ***    Physical Activity: ***    Current medications attributing to weight gain: ***      Depression Screening    3 most recent PHQ Screens 8/19/2022   Little interest or pleasure in doing things Not at all   Feeling down, depressed, irritable, or hopeless Not at all   Total Score PHQ 2 0          ROS:    Review of Systems negative except as noted above in HPI.        ALLERGIES:  No Known Allergies    CURRENT MEDICATIONS:    Current Outpatient Medications   Medication Instructions    acetaminophen (TYLENOL) 650 mg, Oral, 2 TIMES DAILY    buPROPion SR (WELLBUTRIN SR) 100 mg, Oral, 2 TIMES DAILY    cholecalciferol (VITAMIN D3) 2,000 Units, Oral, DAILY    docusate sodium (COLACE) 200 mg, Oral, EVERY OTHER DAY    ibuprofen (MOTRIN) 400 mg, Oral, 400-800 mg daily     loratadine (CLARITIN) 10 mg, Oral, DAILY    losartan-hydroCHLOROthiazide (HYZAAR) 50-12.5 mg per tablet TAKE 1 TABLET BY MOUTH DAILY    methocarbamoL (ROBAXIN) 500 mg tablet TAKE 2 TABLETS BY MOUTH EVERY 6 HOURS AS NEEDED       PAST MEDICAL HISTORY:    Past Medical History:   Diagnosis Date    Arthritis     back    Chronic pain     LOWER BACK       PAST SURGICAL HISTORY:    Past Surgical History:   Procedure Laterality Date    HX VASECTOMY      HX WISDOM TEETH EXTRACTION         FAMILY HISTORY: Family History   Problem Relation Age of Onset    Dementia Mother     Hypertension Father     No Known Problems Sister     Diabetes Brother     No Known Problems Sister     No Known Problems Son     No Known Problems Son     No Known Problems Son     Anesth Problems Neg Hx        SOCIAL HISTORY:    Social History     Socioeconomic History    Marital status:    Tobacco Use    Smoking status: Former     Packs/day: 1.00     Years: 10.00     Pack years: 10.00     Types: Cigarettes     Quit date:      Years since quittin.7    Smokeless tobacco: Former     Quit date:    Vaping Use    Vaping Use: Never used   Substance and Sexual Activity    Alcohol use: Yes     Comment: socially; 8-12 beer/month    Drug use: Not Currently    Sexual activity: Yes     Partners: Female       IMMUNIZATIONS:    Immunization History   Administered Date(s) Administered    COVID-19, PFIZER PURPLE top, DILUTE for use, (age 15 y+), IM, 30mcg/0.3mL 2021, 2021, 2021    Influenza High Dose Vaccine PF 2019    Tdap 2018         PHYSICAL EXAMINATION    Vital Signs  There were no vitals taken for this visit. Weight Metrics 2022   Weight 266 lb 8 oz - 269 lb 14.4 oz 275 lb 12.8 oz 285 lb 12.8 oz - 292 lb 12.8 oz   Neck Circ (inches) - 17.75 - - - 18 -   Waist Measure Inches - 49.5 - - - 51.5 -   Body Fat % - 35.9 - - - 37.7 -   BMI 40.52 kg/m2 - 41.04 kg/m2 41.94 kg/m2 43.46 kg/m2 - 44.52 kg/m2         General appearance - No acute distress. Obese. Eyes - pupils equal and reactive. Extraocular eye movements intact. Sclera anicteric. Nose - normal and patent. No polyps noted. No erythema. No discharge. Neck - supple. ROM cervical spine normal.   Chest - no wheezing. Unlabored respirations. Heart - normal rate. Regular rhythm. Normal S1, S2.   Abdomen - soft and distended. No masses or organomegaly.   Neurological - awake, alert and oriented to person, place, and time and event. Clear speech. Muscle strength is +5/5 x 4 extremities. Steady gait. Heme/Lymph - peripheral pulses normal x 4 extremities. No peripheral edema is noted. Musculoskeletal - Intact x 4 extremities. Full ROM x 4 extremities. No pain with movement. Back exam - normal range of motion. No buffalo hump noted. Psychological - normal behavior, dress and thought processes. Good insight. Good eye contact. Normal affect. Appropriate mood. Normal speech. ASSESSMENT and PLAN  {No Diagnosis Found}     Patient was offered a choice/choices in the treatment plan today. Patient expresses understanding of the plan and agrees with recommendations. More than *** mins spent face to face with patient going over current challenges with the program, new diet plan, water intake plan, and discussing physical activity routines. Also counseling and coordinating care and/or discussing treatment plans in reference to primary diagnosis of *** Obesity There is no height or weight on file to calculate BMI.     Follow up: ***

## 2022-09-15 NOTE — PROGRESS NOTES
[facility] Medically Supervised   [facility]     INITIAL PHYSICIAN VISIT    HISTORY OF PRESENT ILLNESS    Jami Walker is a 50 y.o. male with Morbid Obesity Body mass index is 40.55 kg/m². and associated health concerns presents for evaluation and treatment of weight management. He is here today for establish care with me under the Weight Loss program. He follows with me at [facility]. He has been previously followed by Dr. Alexander Fregoso in the weight loss program starting 07/05/22. He was started on a very low calorie diet in which he lost 13 lb in the first week starting at 296 lb. He has been maintaining his weight at 266 lb since mid-August. He has been writing down his progress in detail and needs to stay accountable. He notes that he does not enjoy documenting his progress in the sheets given in the program. His weight gain started within the last 10 years. He notes that he will get to 240 lbs within the next 3 weeks \"one way or another. \" He has a self-made goal to exercising 4x weekly twice daily by walking and using an elliptical.     He started on the weight loss program due to back pain and limited mobility. He has arthritis in back and suffers chronic lower back pain. He has been trying to walk more and has lost 30 lbs since the start of the program. Back pain has improved significantly with weight loss and he is able to walk longer distances now. He has been busy with work (14 hrs daily) and will come home at 7-8pm. He will be able to fully commit to the program once he finishes his project at work. As of now, he has been able to exercise as much as he is constantly in meetings. On certain days, he notes that he will skip meals and have a bar in replacement. He is on Wellbutrin  mg BID for obesity. He is on losartan-HCTZ 50-12.5 mg daily for HTN. He is on VIT D supplements. He is not taking multi-vitamins. WEIGHT HISTORY     Current weight 266 lb. Lowest weight 185 lb. Maximum weight 296 lb. Short term goal weight 230 lb. Long term goal weight 206 lb. Neck circumference 17.5 in. Waist circumference 48.5 in. Body fat 35.2%. BMI 40.55. Past Weight Loss Programs:  Success: Weight watchers; fitness pal; accountability. Failures: none     Previous Weight Loss Medications (prescription or herbal): none     Weight Loss Surgeries or Abdominal Surgeries: none     Family Hx of Obesity or Childhood Obesity: father; none     Hx of Eating Disorders: none     CURRENT HABITS     Eating/Drinking Habits:  Meals per day/snacking: He is intermittent fasting from 9pm-10am.  For breakfast, he will have a shake at 10am and will have another shake around 2pm for lunch. Soup with boiled egg, mushroom and cucumber for dinner at 7pm. He will have dinner at 7pm and does not eat after 9pm. He notes that he will start to incorporate grilled protein with a salad once he is at 240 lbs. He has been having some fast food for dinner recently such as [employer]. Drinking Habits: at least 64 oz water daily. Sleep Habits: had been referred in the past; denies sleep apnea. Physical Activity:  3k steps during the first week of weight loss program. On avg, he will walk 5k steps daily. Current medications attributing to weight gain: none      Depression Screening    3 most recent PHQ Screens 8/19/2022   Little interest or pleasure in doing things Not at all   Feeling down, depressed, irritable, or hopeless Not at all   Total Score PHQ 2 0          ROS:    Review of Systems negative except as noted above in HPI.        ALLERGIES:  No Known Allergies    CURRENT MEDICATIONS:    Current Outpatient Medications   Medication Instructions    acetaminophen (TYLENOL) 650 mg, Oral, 2 TIMES DAILY    buPROPion SR (WELLBUTRIN SR) 100 mg, Oral, 2 TIMES DAILY    cholecalciferol (VITAMIN D3) 2,000 Units, Oral, DAILY    docusate sodium (COLACE) 200 mg, Oral, EVERY OTHER DAY    ibuprofen (MOTRIN) 400 mg, Oral, 400-800 mg daily     loratadine (CLARITIN) 10 mg, Oral, DAILY    losartan-hydroCHLOROthiazide (HYZAAR) 50-12.5 mg per tablet TAKE 1 TABLET BY MOUTH DAILY    methocarbamoL (ROBAXIN) 500 mg tablet TAKE 2 TABLETS BY MOUTH EVERY 6 HOURS AS NEEDED       PAST MEDICAL HISTORY:    Past Medical History:   Diagnosis Date    Arthritis     back    Chronic pain     LOWER BACK       PAST SURGICAL HISTORY:    Past Surgical History:   Procedure Laterality Date    HX VASECTOMY      HX WISDOM TEETH EXTRACTION         FAMILY HISTORY:    Family History   Problem Relation Age of Onset    Dementia Mother     Hypertension Father     No Known Problems Sister     Diabetes Brother     No Known Problems Sister     No Known Problems Son     No Known Problems Son     No Known Problems Son     Anesth Problems Neg Hx        SOCIAL HISTORY:    Social History     Socioeconomic History    Marital status:    Tobacco Use    Smoking status: Former     Packs/day: 1.00     Years: 10.00     Pack years: 10.00     Types: Cigarettes     Quit date:      Years since quittin.7    Smokeless tobacco: Former     Quit date:    Vaping Use    Vaping Use: Never used   Substance and Sexual Activity    Alcohol use: Yes     Comment: socially; 8-12 beer/month    Drug use: Not Currently    Sexual activity: Yes     Partners: Female       IMMUNIZATIONS:    Immunization History   Administered Date(s) Administered    COVID-19, PFIZER PURPLE top, DILUTE for use, (age 15 y+), IM, 30mcg/0.3mL 2021, 2021, 2021    Influenza High Dose Vaccine PF 2019    Tdap 2018         PHYSICAL EXAMINATION    Vital Signs  Visit Vitals  /81 (BP 1 Location: Right arm, BP Patient Position: Sitting, BP Cuff Size: Large adult)   Pulse 76   Temp 97.9 °F (36.6 °C) (Oral)   Resp 18   Ht 5' 8\" (1.727 m)   Wt 266 lb 11.2 oz (121 kg)   SpO2 96%   BMI 40.55 kg/m²       Weight Metrics 9/15/2022 9/15/2022 2022 2022 2022 2022 7/22/2022   Weight - 266 lb 11.2 oz 266 lb 8 oz - 269 lb 14.4 oz 275 lb 12.8 oz 285 lb 12.8 oz   Neck Circ (inches) 17.5 - - 17.75 - - -   Waist Measure Inches 48.5 - - 49.5 - - -   Body Fat % 35.2 - - 35.9 - - -   BMI - 40.55 kg/m2 40.52 kg/m2 - 41.04 kg/m2 41.94 kg/m2 43.46 kg/m2         General appearance - No acute distress. Obese. Eyes - pupils equal and reactive. Extraocular eye movements intact. Sclera anicteric. Nose - normal and patent. No polyps noted. No erythema. No discharge. Neck - supple. ROM cervical spine normal.   Chest - no wheezing. Unlabored respirations. Heart - normal rate. Regular rhythm. Normal S1, S2.   Abdomen - soft and distended. No masses or organomegaly. Neurological - awake, alert and oriented to person, place, and time and event. Clear speech. Muscle strength is +5/5 x 4 extremities. Steady gait. Heme/Lymph - peripheral pulses normal x 4 extremities. No peripheral edema is noted. Musculoskeletal - Intact x 4 extremities. Full ROM x 4 extremities. No pain with movement. Back exam - normal range of motion. No buffalo hump noted. Psychological - normal behavior, dress and thought processes. Good insight. Good eye contact. Normal affect. Appropriate mood. Normal speech. ASSESSMENT and PLAN    ICD-10-CM ICD-9-CM    1. Morbidly obese (HCC)  E66.01 278.01 We made a plan for intermittent fasting from 7pm-10am. Continue on very low calorie diet with 2 shakes for breakfast and lunch. For dinner, continue with a soup, but advised that he have a salad additionally. Advised him to take a multivitamin daily. Continue with exercising when he can during this period while he is busy with work. Although he has a set goal to lose 26 lb within the next 3 weeks, he should continue maintain a healthy diet. Explained that he should be walking 5,000 steps daily to maintain conditioning and weight and increase to at least 10,000 steps to work on losing weight.   Continue drinking at least 64 oz water daily. 2. BMI 40.0-44.9, adult ([facility])  Z68.41 V85.41 BMI will improve with weight loss. 3. Primary hypertension  I10 401.9 BP is well-controlled on current medication. No change to dosage at this time. 4. Lumbar degenerative disc disease  M51.36 722.52 Back pain has improved with weight loss. Continue with weight loss regimen. Patient was offered a choice/choices in the treatment plan today. Patient expresses understanding of the plan and agrees with recommendations. More than 45 mins spent face to face with patient going over weight gain history, challenges for weight loss, new diet plan, water intake plan, and discussing physical activity routines. Also counseling and coordinating care and/or discussing treatment plans in reference to primary diagnosis of Morbid Obesity Body mass index is 40.55 kg/m². Follow up: 4 weeks    Zachary Fitch MD, personally performed the services described in this documentation as scribed by Winter Meza in my presence, and it is both accurate and complete.

## 2022-09-15 NOTE — PROGRESS NOTES
Weight Management. 1 month follow up. 1. Have you been to the ER, urgent care clinic since your last visit? Hospitalized since your last visit? No    2. Have you seen or consulted any other health care providers outside of the [address] [place] [address] since your last visit? Include any pap smears or colon screening.  No    BMI - 40.5

## 2022-09-15 NOTE — PROGRESS NOTES
8/22/2022    Progress Note: Weekly Education Class in the [facility] Weight Loss Program         Patient is on Very Low Calorie Diet [x] (4 meal replacements per day, 800 kcal/day)      Low Calorie Diet [] (2-3 meal replacements per day, 6539-6244 kcal/day)    1) Did patient have any new symptoms or physical problems? Yes []    No[x]     If yes, check & comment: weakness [], fatigue [], lightheadedness [], headache [], cramps [], cold intolerance , hair loss [], diarrhea [], constipation [],  NA [] other:                                 2) Has patient had any medical attention from other providers, urgent care or the emergency room this week? Yes []  No [x]       NA [], If yes, why:                                       3) Any other sugar sweetened beverages consumed this week? Yes []  No [x]    4) Did patient have any problems adhering to the diet? Yes [x]  No [] NA []    If yes, Vacation [], Celebrations [], Conferences [], Family Reunions [] other: weekend dinners with son                                               5) How many hours of sleep this week? 9-10    (range)  NA []    Number of meal replacements consumed daily? 2-4  (range)  NA []    Average ounces of water patient consumed daily this week (not including shakes)? 86     (divide the weekly total by 7)    Did you eat any food outside of the program? Yes [x] No []    Physical Activity Over the Past Week:    Cardio exercise: 0 min  Strength exercise: 0 workouts / week  Number of steps walked per day: 0    How has patient mood overall been this week? Sad [], Happy [], Stressed [], Tired [], Content [x], NA [], other            Medications reconciled by nurse Yes [x]  No[]    Patient was given therapeutic recommendations for any noted side effects of their dietary approach based upon [facility] patient manual per providers recommendation.      8/29/2022    Progress Note: Weekly Education Class in the [facility] Weight Loss Program Patient is on Very Low Calorie Diet [x] (4 meal replacements per day, 800 kcal/day)      Low Calorie Diet [] (2-3 meal replacements per day, 7842-8396 kcal/day)    1) Did patient have any new symptoms or physical problems? Yes []    No [x]    If yes, check & comment: weakness [], fatigue [], lightheadedness [], headache [], cramps [], cold intolerance [], hair loss [], diarrhea [], constipation [],  NA [] other:                                 2) Has patient had any medical attention from other providers, urgent care or the emergency room this week? Yes []  No [x]       NA [], If yes, why:                                       3) Any other sugar sweetened beverages consumed this week? Yes []  No [x]    4) Did patient have any problems adhering to the diet? Yes [x]  No [] NA []    If yes, Vacation [], Celebrations [], Conferences [], Family Reunions [] other: Labor Day weekend party                                               5) How many hours of sleep this week? 9    (range)  NA []    Number of meal replacements consumed daily? 4  (range)  NA []    Average ounces of water patient consumed daily this week (not including shakes)? 84.5     (divide the weekly total by 7)    Did you eat any food outside of the program? Yes [x] No []    Physical Activity Over the Past Week:    Cardio exercise: 0 min  Strength exercise: 0 workouts / week  Number of steps walked per day: 0    How has patient mood overall been this week? Sad [], Happy [], Stressed [], Tired [], Content [], NA [], other Good           Medications reconciled by nurse Yes [x]  No[]    Patient was given therapeutic recommendations for any noted side effects of their dietary approach based upon [facility] patient manual per providers recommendation.      9/5/2022    Progress Note: Weekly Education Class in the [facility] Loss [facility]         Patient is on Very Low Calorie Diet [x] (4 meal replacements per day, 800 kcal/day)      Low Calorie Diet [] (2-3 meal replacements per day, 4612-9665 kcal/day)    1) Did patient have any new symptoms or physical problems? Yes []    No [x]    If yes, check & comment: weakness [], fatigue [], lightheadedness [], headache [], cramps [], cold intolerance [], hair loss [], diarrhea [], constipation [],  NA [] other:                                 2) Has patient had any medical attention from other providers, urgent care or the emergency room this week? Yes []  No [x]       NA [], If yes, why:                                       3) Any other sugar sweetened beverages consumed this week? Yes []  No [x]    4) Did patient have any problems adhering to the diet? Yes [x]  No [] NA []    If yes, Vacation [x], Celebrations [], Conferences [x], Family Reunions [] other:                                                5) How many hours of sleep this week? 9-10    (range)  NA []    Number of meal replacements consumed daily? 2-4  (range)  NA []    Average ounces of water patient consumed daily this week (not including shakes)? 84.5     (divide the weekly total by 7)    Did you eat any food outside of the program? Yes [x] No []    Physical Activity Over the Past Week:    Cardio exercise: 0 min  Strength exercise: 0 workouts / week  Number of steps walked per day: 0    How has patient mood overall been this week? Sad [], Happy [], Stressed [], Tired [], Content [], NA [], other Good           Medications reconciled by nurse Yes [x]  No[]    Patient was given therapeutic recommendations for any noted side effects of their dietary approach based upon [facility] patient manual per providers recommendation.

## 2022-09-16 ENCOUNTER — OFFICE VISIT (OUTPATIENT)
Dept: PRIMARY CARE CLINIC | Age: 47
End: 2022-09-16
Payer: COMMERCIAL

## 2022-09-16 VITALS
DIASTOLIC BLOOD PRESSURE: 77 MMHG | TEMPERATURE: 97.5 F | HEART RATE: 107 BPM | OXYGEN SATURATION: 95 % | BODY MASS INDEX: 40.62 KG/M2 | WEIGHT: 268 LBS | SYSTOLIC BLOOD PRESSURE: 111 MMHG | RESPIRATION RATE: 16 BRPM | HEIGHT: 68 IN

## 2022-09-16 DIAGNOSIS — R00.0 TACHYCARDIA WITH HEART RATE 100-120 BEATS PER MINUTE: ICD-10-CM

## 2022-09-16 DIAGNOSIS — L72.9 SCALP CYST: ICD-10-CM

## 2022-09-16 DIAGNOSIS — E78.2 MIXED HYPERLIPIDEMIA: ICD-10-CM

## 2022-09-16 DIAGNOSIS — E66.01 CLASS 3 SEVERE OBESITY DUE TO EXCESS CALORIES WITH SERIOUS COMORBIDITY AND BODY MASS INDEX (BMI) OF 40.0 TO 44.9 IN ADULT (HCC): Primary | ICD-10-CM

## 2022-09-16 DIAGNOSIS — I10 ESSENTIAL HYPERTENSION: ICD-10-CM

## 2022-09-16 PROCEDURE — 99214 OFFICE O/P EST MOD 30 MIN: CPT | Performed by: NURSE PRACTITIONER

## 2022-09-16 RX ORDER — SULFAMETHOXAZOLE AND TRIMETHOPRIM 800; 160 MG/1; MG/1
1 TABLET ORAL 2 TIMES DAILY
Qty: 20 TABLET | Refills: 0 | Status: SHIPPED | OUTPATIENT
Start: 2022-09-16 | End: 2022-09-27

## 2022-09-16 NOTE — PROGRESS NOTES
[facility]   Scole [address] 63[address], [address] [place] [address]  P: [phone]  F: [phone]    SUBJECTIVE     HPI:     Roderick Strauss is a 50 y.o. male who is seen in the clinic for   Chief Complaint   Patient presents with    Mass     Noticed at least 6 weeks ago on back of head-  pt complains of pain around area sometimes- pt states that lump is hard-       The patient with a PMH of HTN, Mixed HLD, and Morbid Obesity presents today with skin complaints of complaints that began initially 6-8 weeks ago. He states that he noticed a \"semi-hard\" cyst on the posterior aspect of his head. Endorses tenderness with palpation. Denies any oozing/pus exiting from the area. Denies any growth of cyst. Hard. Has lost 40 lbs since starting Weight Loss Regimen created by Dr. Bryon Lutz at [facility]. Takes Wellbutrin to curb appetite. BP is 111/77 today. Currently on Hyzaar. Lipid Panel on 05/17 showed the following abnormalities: Total Cholesterol 238 and . No statin was added to the patient's medication regimen at that time. He was told to modify his diet/lifestyle by Dr. Bryon Lutz. HR is 107 today. Baseline HR in 90s. Patient states that he \"drank a lot of caffeine today\" and has been feeling stressed related to his job. Denies any associated s/s.      Patient Active Problem List    Diagnosis    S/P lumbar discectomy    Primary hypertension    Morbidly obese (HCC)    Lumbar spine instability        Past Medical History:   Diagnosis Date    Arthritis     back    Chronic pain     LOWER BACK     Past Surgical History:   Procedure Laterality Date    HX VASECTOMY      HX WISDOM TEETH EXTRACTION       Social History     Socioeconomic History    Marital status:      Spouse name: Not on file    Number of children: Not on file    Years of education: Not on file    Highest education level: Not on file   Occupational History    Not on file   Tobacco Use    Smoking status: Former Packs/day: 1.00     Years: 10.00     Pack years: 10.00     Types: Cigarettes     Quit date: 44     Years since quittin.7    Smokeless tobacco: Former     Quit date:    Vaping Use    Vaping Use: Never used   Substance and Sexual Activity    Alcohol use: Yes     Comment: socially; 8-12 beer/month    Drug use: Not Currently    Sexual activity: Yes     Partners: Female   Other Topics Concern    Not on file   Social History Narrative    Not on file     Social Determinants of Health     Financial Resource Strain: Low Risk     Difficulty of Paying Living Expenses: Not hard at all   Food Insecurity: No Food Insecurity    Worried About Running Out of Food in the Last Year: Never true    Ran Out of Food in the Last Year: Never true   Transportation Needs: Not on file   Physical Activity: Not on file   Stress: Not on file   Social Connections: Not on file   Intimate Partner Violence: Not on file   Housing Stability: Not on file     Family History   Problem Relation Age of Onset    Dementia Mother     Hypertension Father     No Known Problems Sister     Diabetes Brother     No Known Problems Sister     No Known Problems Son     No Known Problems Son     No Known Problems Son     Anesth Problems Neg Hx      Immunization History   Administered Date(s) Administered    COVID-19, PFIZER PURPLE top, DILUTE for use, (age 15 y+), IM, 30mcg/0.3mL 2021, 2021, 2021    Influenza High Dose Vaccine PF 2019    Tdap 2018      No Known Allergies    Office Visit on 2022   Component Date Value Ref Range Status    Glucose 2022 103 (A) 65 - 99 mg/dL Final    BUN 2022 14  6 - 24 mg/dL Final    Creatinine 2022 1.07  0.76 - 1.27 mg/dL Final    eGFR 2022 85  >59 mL/min/1.73 Final    BUN/Creatinine ratio 2022 13  9 - 20 Final    Sodium 2022 136  134 - 144 mmol/L Final    Potassium 2022 3.9  3.5 - 5.2 mmol/L Final    Chloride 2022 98  96 - 106 mmol/L Final CO2 08/13/2022 20  20 - 29 mmol/L Final    Calcium 08/13/2022 9.7  8.7 - 10.2 mg/dL Final    Protein, total 08/13/2022 7.3  6.0 - 8.5 g/dL Final    Albumin 08/13/2022 4.7  4.0 - 5.0 g/dL Final    GLOBULIN, TOTAL 08/13/2022 2.6  1.5 - 4.5 g/dL Final    A-G Ratio 08/13/2022 1.8  1.2 - 2.2 Final    Bilirubin, total 08/13/2022 0.7  0.0 - 1.2 mg/dL Final    Alk. phosphatase 08/13/2022 53  44 - 121 IU/L Final    AST (SGOT) 08/13/2022 38  0 - 40 IU/L Final    ALT (SGPT) 08/13/2022 50 (A) 0 - 44 IU/L Final    Hemoglobin A1c 08/13/2022 5.5  4.8 - 5.6 % Final    Comment:          Prediabetes: 5.7 - 6.4           Diabetes: >6.4           Glycemic control for adults with diabetes: <7.0        MRI LUMB SPINE W WO CONT  Narrative: MRI OF THE LUMBAR SPINE WITH AND WITHOUT CONTRAST: 5/6/2022 1:01 PM    INDICATION:  Lumbar spinal stenosis without neurogenic claudication. TECHNIQUE: Multiplanar and multisequence MRI was obtained of the lumbar spine  before and after the administration of 20 cc IV gadoterate meglumine (Dotarem) . COMPARISON: 6/1/2020. Abdominal radiographs 2/27/2021    FINDINGS:   Post L4-L5 posterior pedicle screw and katia fusion and interbody disc prosthesis  placement. The interbody disc prosthesis protrudes into the left paracentral  epidural space and causes moderate canal stenosis and obliteration of the left  lateral recess (12-19, 10-20, 7-8). Mild levoconvexity is centered around L2. A left L5 sacral assimilation joint is  a normal variant. Bone marrow signal intensity is normal. Vertebral body heights  are preserved. The visualized cord is normal in caliber and signal intensity. The conus medullaris terminates at the L1 level. The paraspinal soft tissues are  normal. No abnormal enhancement. The canal is congenitally narrow, measuring only 12 mm at the level of L2. This  exacerbates degenerative stenoses below:  T12-L1: No stenosis. L1-L2: No stenosis. Mild facet osteoarthritis.   L2-L3: Facet osteoarthritis causes minimal canal narrowing. L3-L4: A synovial cyst from the left facet joint (12-14, 7-8) obliterates the  left lateral recess and causes moderate canal stenosis. This is new from 2020. Facet osteoarthritis causes mild bilateral neural foraminal stenosis. L4-L5: The protruding disc prosthesis causes moderate canal stenosis and  obliteration of the left lateral recess. Facet osteoarthritis causes mild  bilateral neural foraminal stenosis. L5-S1: No stenosis. Impression: 1. L4-L5 interbody disc prosthesis protrudes into the left paracentral epidural  space. Moderate canal stenosis and obliteration of the left lateral recess. 2. Left facet synovial cyst, L3-L4, causes moderate canal stenosis and  obliterates the left lateral recess. 3. Congenitally narrow canal.     Current Outpatient Medications   Medication Sig Dispense Refill    trimethoprim-sulfamethoxazole (BACTRIM DS, SEPTRA DS) 160-800 mg per tablet Take 1 Tablet by mouth two (2) times a day for 10 days. 20 Tablet 0    docusate sodium (COLACE) 100 mg capsule Take 200 mg by mouth every other day. loratadine (CLARITIN) 10 mg tablet Take 10 mg by mouth daily. ibuprofen (MOTRIN) 200 mg tablet Take 400 mg by mouth. 400-800 mg daily      buPROPion SR (WELLBUTRIN SR) 100 mg SR tablet Take 1 Tablet by mouth two (2) times a day. 60 Tablet 2    losartan-hydroCHLOROthiazide (HYZAAR) 50-12.5 mg per tablet TAKE 1 TABLET BY MOUTH DAILY 90 Tablet 0    acetaminophen (TYLENOL) 325 mg tablet Take 650 mg by mouth two (2) times a day. cholecalciferol (VITAMIN D3) (1000 Units /25 mcg) tablet Take 2,000 Units by mouth daily. methocarbamoL (ROBAXIN) 500 mg tablet TAKE 2 TABLETS BY MOUTH EVERY 6 HOURS AS NEEDED (Patient not taking: No sig reported)             The past medical history, past surgical history, and family history were reviewed and updated in the medical record. Lab values/Imaging were reviewed.     The medications were reviewed and updated in the medical record. Immunizations were reviewed and updated in the medical record. All relevant preventative screenings reviewed and updated in the medical record. REVIEW OF SYSTEMS   Review of Systems   Constitutional:  Negative for chills, diaphoresis and fever. Respiratory:  Negative for shortness of breath. Cardiovascular:  Negative for chest pain and palpitations. Skin:         Cyst on posterior aspect of head   Neurological:  Negative for dizziness and headaches. Psychiatric/Behavioral:  The patient is nervous/anxious. PHYSICAL EXAM   /77 (BP 1 Location: Right arm, BP Patient Position: Sitting, BP Cuff Size: Adult long)   Pulse (!) 107   Temp 97.5 °F (36.4 °C) (Temporal)   Resp 16   Ht 5' 8\" (1.727 m)   Wt 268 lb (121.6 kg)   SpO2 95%   BMI 40.75 kg/m²      Physical Exam  Constitutional:       General: He is not in acute distress. Cardiovascular:      Rate and Rhythm: Regular rhythm. Tachycardia present. Pulses: Normal pulses. Heart sounds: Normal heart sounds. Pulmonary:      Effort: Pulmonary effort is normal.      Breath sounds: Normal breath sounds. Skin:     Comments: Soft, mobile cyst on posterior aspect of head. Neurological:      Mental Status: He is alert. ASSESSMENT/ PLAN   Below is the assessment and plan developed based on review of pertinent history, physical exam, labs, studies, and medications. 1. Class 3 severe obesity due to excess calories with serious comorbidity and body mass index (BMI) of 40.0 to 44.9 in adult [facility])  Managed primarily by Dr. Desi Douglas at [facility]. 2. Essential hypertension  Continue with current treatment regimen. 3. Mixed hyperlipidemia  Lipid Panel has likely improved with weight loss. Continue with current treatment regimen. 4. Tachycardia with heart rate 100-120 beats per minute  Likely due to anxiety/increased caffeine intake.  If continues to be elevated in future appointments, may want to consider EKG. 5. Scalp cyst  May need to be removed by Dermatology.   -     REFERRAL TO [address] [place] [address]; Future  -     trimethoprim-sulfamethoxazole (BACTRIM DS, SEPTRA DS) 160-800 mg per tablet; Take 1 Tablet by mouth two (2) times a day for 10 days. , Normal, Disp-20 Tablet, R-0           Follow-up and Dispositions    Return if symptoms worsen or fail to improve with the interventions above. Disclaimer:  Advised patient to call back or return to office if symptoms worsen/change/persist.  Discussed expected course/resolution/complications of diagnosis in detail with patient. Medication risks/benefits/alternatives discussed with patient. Patient was given an after visit summary which includes diagnoses, current medications, & vitals. Discussed patient instructions and advised to read to all patient instructions regarding care. Patient expressed understanding with the diagnosis and plan.        Jeovany Eisenberg NP  9/16/2022

## 2022-09-16 NOTE — PROGRESS NOTES
Chief Complaint   Patient presents with    Mass     Noticed at least 6 weeks ago on back of head-  pt complains of pain around area sometimes- pt states that lump is hard-        Health Maintenance Due   Topic    Colorectal Cancer Screening Combo     Flu Vaccine (1)        1. Have you been to the ER, urgent care clinic since your last visit? Hospitalized since your last visit? No    2. Have you seen or consulted any other health care providers outside of the [address] [place] [address] since your last visit? Include any pap smears or colon screening.  No    Visit Vitals  /77 (BP 1 Location: Right arm, BP Patient Position: Sitting, BP Cuff Size: Adult long)   Pulse 95   Temp 97.5 °F (36.4 °C) (Temporal)   Resp 16   Ht 5' 8\" (1.727 m)   Wt 268 lb (121.6 kg)   BMI 40.75 kg/m²

## 2022-09-24 DIAGNOSIS — E66.01 CLASS 3 OBESITY (HCC): ICD-10-CM

## 2022-09-27 DIAGNOSIS — I10 ESSENTIAL HYPERTENSION: ICD-10-CM

## 2022-09-27 RX ORDER — BUPROPION HYDROCHLORIDE 100 MG/1
TABLET, EXTENDED RELEASE ORAL
Qty: 60 TABLET | Refills: 2 | Status: SHIPPED | OUTPATIENT
Start: 2022-09-27 | End: 2022-11-03 | Stop reason: ALTCHOICE

## 2022-09-27 RX ORDER — LOSARTAN POTASSIUM AND HYDROCHLOROTHIAZIDE 12.5; 5 MG/1; MG/1
TABLET ORAL
Qty: 90 TABLET | Refills: 0 | Status: SHIPPED | OUTPATIENT
Start: 2022-09-27

## 2022-10-03 ENCOUNTER — CLINICAL SUPPORT (OUTPATIENT)
Dept: SURGERY | Age: 47
End: 2022-10-03

## 2022-10-03 VITALS
HEART RATE: 86 BPM | HEIGHT: 68 IN | WEIGHT: 268.2 LBS | OXYGEN SATURATION: 94 % | SYSTOLIC BLOOD PRESSURE: 121 MMHG | DIASTOLIC BLOOD PRESSURE: 85 MMHG | TEMPERATURE: 98.3 F | BODY MASS INDEX: 40.65 KG/M2 | RESPIRATION RATE: 18 BRPM

## 2022-10-03 DIAGNOSIS — E66.01 SEVERE OBESITY (HCC): Primary | ICD-10-CM

## 2022-10-03 NOTE — PROGRESS NOTES
9/19/2022    Progress Note: Weekly Education Class in the [facility]         Patient is on Very Low Calorie Diet [] (4 meal replacements per day, 800 kcal/day)      Low Calorie Diet [x] (2-3 meal replacements per day, 0114-2725 kcal/day)    1) Did patient have any new symptoms or physical problems? Yes []    No [x]    If yes, check & comment: weakness [], fatigue [], lightheadedness [], headache [], cramps [], cold intolerance [], hair loss [], diarrhea [], constipation [],  NA [] other:                                 2) Has patient had any medical attention from other providers, urgent care or the emergency room this week? Yes []  No [x]       NA [], If yes, why:                                       3) Any other sugar sweetened beverages consumed this week? Yes []  No [x]    4) Did patient have any problems adhering to the diet? Yes []  No [x] NA []    If yes, Vacation [], Celebrations [], Conferences [], Family Reunions [] other: team lunch from [address] [place] [address]                                               5) How many hours of sleep this week? 9    (range)  NA []    Number of meal replacements consumed daily? 2-4  (range)  NA []    Average ounces of water patient consumed daily this week (not including shakes)? 90+     (divide the weekly total by 7)    Did you eat any food outside of the program? Yes X No []    Physical Activity Over the Past Week:    Cardio exercise: 0 min  Strength exercise: 0 workouts / week  Number of steps walked per day: 0    How has patient mood overall been this week? Sad [], Happy [], Stressed [], Tired [], Content [], NA [], other Several different moods             Medications reconciled by nurse Yes [x]  No[]    Patient was given therapeutic recommendations for any noted side effects of their dietary approach based upon [facility] patient manual per providers recommendation.      9/26/2022    Progress Note: Weekly Education Class in the [facility]         Patient is on Very Low Calorie Diet [] (4 meal replacements per day, 800 kcal/day)      Low Calorie Diet [x] (2-3 meal replacements per day, 5576-0413 kcal/day)    1) Did patient have any new symptoms or physical problems? Yes []    No [x]    If yes, check & comment: weakness [], fatigue [], lightheadedness [], headache [], cramps [], cold intolerance [], hair loss [], diarrhea [], constipation [],  NA [] other:                                 2) Has patient had any medical attention from other providers, urgent care or the emergency room this week? Yes []  No [x]       NA [], If yes, why:                                       3) Any other sugar sweetened beverages consumed this week? Yes []  No [x]    4) Did patient have any problems adhering to the diet? Yes [x]  No [] NA []    If yes, Vacation [], Celebrations [], Conferences [], Family Reunions [] other: Took a break from 10/1-10/2  For weighingin at [address] 9/29                                               5) How many hours of sleep this week? 9    (range)  NA []    Number of meal replacements consumed daily? 4  (range)  NA []    Average ounces of water patient consumed daily this week (not including shakes)? 90+     (divide the weekly total by 7)    Did you eat any food outside of the program? Yes [x] No []    Physical Activity Over the Past Week:    Cardio exercise: 0 min  Strength exercise: 0 workouts / week  Number of steps walked per day: 0    How has patient mood overall been this week? Sad [], Happy [], Stressed [], Tired [x], Content [], NA [], other            Medications reconciled by nurse Yes [x]  No[]    Patient was given therapeutic recommendations for any noted side effects of their dietary approach based upon [facility] patient manual per providers recommendation.

## 2022-10-03 NOTE — PROGRESS NOTES
Weight Management. 1. Have you been to the ER, urgent care clinic since your last visit? Hospitalized since your last visit? No    2. Have you seen or consulted any other health care providers outside of the [address] [place] [address] since your last visit? Include any pap smears or colon screening.  No

## 2022-10-17 ENCOUNTER — CLINICAL SUPPORT (OUTPATIENT)
Dept: SURGERY | Age: 47
End: 2022-10-17

## 2022-10-17 VITALS
OXYGEN SATURATION: 93 % | HEIGHT: 68 IN | TEMPERATURE: 98.3 F | BODY MASS INDEX: 39.74 KG/M2 | WEIGHT: 262.2 LBS | RESPIRATION RATE: 18 BRPM | HEART RATE: 96 BPM | SYSTOLIC BLOOD PRESSURE: 131 MMHG | DIASTOLIC BLOOD PRESSURE: 85 MMHG

## 2022-10-17 DIAGNOSIS — E66.01 SEVERE OBESITY (HCC): Primary | ICD-10-CM

## 2022-10-17 NOTE — PROGRESS NOTES
Weight Management. 1. Have you been to the ER, urgent care clinic since your last visit? Hospitalized since your last visit? No    2. Have you seen or consulted any other health care providers outside of the [address] [place] [address] since your last visit? Include any pap smears or colon screening.  No

## 2022-10-20 NOTE — PROGRESS NOTES
Triage Nurse note reviewed. Patient has been tolerating VLCD diet well. No side effects reported to the Nurse or on call provider. Patient will have a follow up in weight loss clinic.

## 2022-11-03 ENCOUNTER — OFFICE VISIT (OUTPATIENT)
Dept: SURGERY | Age: 47
End: 2022-11-03
Payer: COMMERCIAL

## 2022-11-03 VITALS
BODY MASS INDEX: 40.15 KG/M2 | OXYGEN SATURATION: 97 % | SYSTOLIC BLOOD PRESSURE: 122 MMHG | HEART RATE: 87 BPM | WEIGHT: 264.9 LBS | DIASTOLIC BLOOD PRESSURE: 84 MMHG | HEIGHT: 68 IN | RESPIRATION RATE: 18 BRPM | TEMPERATURE: 97.7 F

## 2022-11-03 DIAGNOSIS — I10 PRIMARY HYPERTENSION: ICD-10-CM

## 2022-11-03 DIAGNOSIS — E66.01 MORBIDLY OBESE (HCC): Primary | ICD-10-CM

## 2022-11-03 DIAGNOSIS — F34.1 DYSTHYMIA: ICD-10-CM

## 2022-11-03 DIAGNOSIS — Z98.890 S/P LUMBAR DISCECTOMY: ICD-10-CM

## 2022-11-03 PROCEDURE — 3074F SYST BP LT 130 MM HG: CPT | Performed by: INTERNAL MEDICINE

## 2022-11-03 PROCEDURE — 3078F DIAST BP <80 MM HG: CPT | Performed by: INTERNAL MEDICINE

## 2022-11-03 PROCEDURE — 99214 OFFICE O/P EST MOD 30 MIN: CPT | Performed by: INTERNAL MEDICINE

## 2022-11-03 RX ORDER — BUPROPION HYDROCHLORIDE 300 MG/1
300 TABLET ORAL
Qty: 30 TABLET | Refills: 2 | Status: SHIPPED | OUTPATIENT
Start: 2022-11-03 | End: 2023-02-01

## 2022-11-03 RX ORDER — BUPROPION HYDROCHLORIDE 150 MG/1
150 TABLET ORAL
Qty: 30 TABLET | Refills: 0 | Status: SHIPPED | OUTPATIENT
Start: 2022-11-03 | End: 2022-11-03 | Stop reason: ALTCHOICE

## 2022-11-03 NOTE — PROGRESS NOTES
[facility]   [facility]     FOLLOW-UP PHYSICIAN VISIT    HISTORY OF PRESENT ILLNESS    Becky Taylor is a 50 y.o. male with morbid Obesity Body mass index is 40.28 kg/m². and associated health concerns presents for follow-up of weight management. The patient has lost 2 lb since his initial visit on 9/15/22. He notes that he has not been sticking to his diet as much due to being busy at work. He would like to get back on VLCD and stay on this until mid December. He would like to be below 245 lb by the end of the month. His BP today is good at 122/84. He is on losartan-HCTZ 50-12.5 mg daily for HTN. He is on Wellbutrin 100 mg BID for depression. Notes this has been working well to control his mood. He does not feel like Wellbutrin has helped to reduce his food cravings. WEIGHT HISTORY    Current weight 264 lb. Weight loss since previous visit -2 lb. Total weight loss -2 lb. Short term goal weight 230 lb. Long term goal weight 206 lb. Neck circumference 17.5 in. Waist circumference 48.5 in. Body fat 34.8%. Current BMI 40.28. CURRENT HABITS    Eating/Drinking Habits:  Meals per day/snacking: He has been intermittent fasting from 9p-11a. He will have a protein shake for breakfast and lunch. Merrick Phalen is usually whatever is around. He has been traveling a lot for work and not sticking to his diet while out of town. He was eating a lot of fast food. Recently he has been trying to have the program soups, boiled egg, and cucumber for dinner.    Drinking Habits: 90 oz water daily    Sleep Habits: has been referred for sleep study in the past, denies GILSON    Physical Activity: body weight exercises, about 5k-10k steps daily    Current medications attributing to weight gain: none      Depression Screening    3 most recent PHQ Screens 9/16/2022   Little interest or pleasure in doing things Not at all   Feeling down, depressed, irritable, or hopeless Not at all   Total Score PHQ 2 0          ROS:    Review of Systems negative except as noted above in HPI.        ALLERGIES:  No Known Allergies    CURRENT MEDICATIONS:    Current Outpatient Medications   Medication Instructions    acetaminophen (TYLENOL) 650 mg, Oral, 2 TIMES DAILY    buPROPion XL (WELLBUTRIN XL) 300 mg, Oral, 7AM    cholecalciferol (VITAMIN D3) 2,000 Units, Oral, DAILY    docusate sodium (COLACE) 200 mg, Oral, EVERY OTHER DAY    ibuprofen (MOTRIN) 400 mg, Oral, 400-800 mg daily     loratadine (CLARITIN) 10 mg, Oral, DAILY    losartan-hydroCHLOROthiazide (HYZAAR) 50-12.5 mg per tablet TAKE 1 TABLET BY MOUTH DAILY    methocarbamoL (ROBAXIN) 500 mg tablet TAKE 2 TABLETS BY MOUTH EVERY 6 HOURS AS NEEDED       PAST MEDICAL HISTORY:    Past Medical History:   Diagnosis Date    Arthritis     back    Chronic pain     LOWER BACK       PAST SURGICAL HISTORY:    Past Surgical History:   Procedure Laterality Date    HX VASECTOMY      HX WISDOM TEETH EXTRACTION         FAMILY HISTORY:    Family History   Problem Relation Age of Onset    Dementia Mother     Hypertension Father     No Known Problems Sister     Diabetes Brother     No Known Problems Sister     No Known Problems Son     No Known Problems Son     No Known Problems Son     Anesth Problems Neg Hx        SOCIAL HISTORY:    Social History     Socioeconomic History    Marital status:    Tobacco Use    Smoking status: Former     Packs/day: 1.00     Years: 10.00     Pack years: 10.00     Types: Cigarettes     Quit date:      Years since quittin.8    Smokeless tobacco: Former     Quit date:    Vaping Use    Vaping Use: Never used   Substance and Sexual Activity    Alcohol use: Yes     Comment: socially; 8-12 beer/month    Drug use: Not Currently    Sexual activity: Yes     Partners: Female     Social Determinants of Health     Financial Resource Strain: Low Risk     Difficulty of Paying Living Expenses: Not hard at all   Food Insecurity: No Food Insecurity Worried About [facility] in the Last Year: Never true    [facility] in the Last Year: Never true       IMMUNIZATIONS:    Immunization History   Administered Date(s) Administered    COVID-19, PFIZER PURPLE top, DILUTE for use, (age 15 y+), IM, 30mcg/0.3mL 03/26/2021, 04/17/2021, 12/28/2021    Influenza High Dose Vaccine PF 09/23/2019    Tdap 06/28/2018         PHYSICAL EXAMINATION    Vital Signs  Visit Vitals  /84 (BP 1 Location: Right arm, BP Patient Position: Sitting, BP Cuff Size: Large adult)   Pulse 87   Temp 97.7 °F (36.5 °C)   Resp 18   Ht 5' 8\" (1.727 m)   Wt 264 lb 14.4 oz (120.2 kg)   SpO2 97%   BMI 40.28 kg/m²       Weight Metrics 11/3/2022 11/3/2022 10/17/2022 10/3/2022 9/16/2022 9/15/2022 9/15/2022   Weight - 264 lb 14.4 oz 262 lb 3.2 oz 268 lb 3.2 oz 268 lb - 266 lb 11.2 oz   Neck Circ (inches) 17.5 - - - - 17.5 -   Waist Measure Inches 48.5 - - - - 48.5 -   Body Fat % 34.8 - - - - 35.2 -   BMI - 40.28 kg/m2 39.87 kg/m2 40.78 kg/m2 40.75 kg/m2 - 40.55 kg/m2         General appearance - No acute distress. Morbidly Obese. Eyes - pupils equal and reactive. Extraocular eye movements intact. Sclera anicteric. Nose - normal and patent. No polyps noted. No erythema. No discharge. Neck - supple. ROM cervical spine normal.   Chest - no wheezing. Unlabored respirations. Heart - normal rate. Regular rhythm. Normal S1, S2.   Abdomen - soft and distended. No masses or organomegaly. Neurological - awake, alert and oriented to person, place, and time and event. Clear speech. Muscle strength is +5/5 x 4 extremities. Steady gait. Heme/Lymph - peripheral pulses normal x 4 extremities. No peripheral edema is noted. Musculoskeletal - Intact x 4 extremities. Full ROM x 4 extremities. No pain with movement. Back exam - normal range of motion. No buffalo hump noted. Psychological - normal behavior, dress and thought processes. Good insight. Good eye contact. Normal affect. Appropriate mood. Normal speech. ASSESSMENT and PLAN    ICD-10-CM ICD-9-CM    1. Morbidly obese ([facility])  E66.01 278.01 buPROPion XL (WELLBUTRIN XL) 300 mg XL tablet sent to pharmacy. The patient has not lost much weight since his initial visit, but not sticking to his diet while traveling for work. He will resume his intermittent fasting and previous plan for VLCD. We will also increase his Wellbutrin to 300 mg daily to help with food cravings. He should continue to try and get at least 5k steps daily and drink at least 64 oz water daily. 2. BMI 40.0-44.9, adult ([facility])  Z68.41 V85.41 BMI will improve with weight loss. 3. Primary hypertension  I10 401.9 BP is well controlled on losartan-HCTZ 50-12.5 mg daily. Continue current medication(s). 4. S/P lumbar discectomy  Z98.890 V45.89 Followed by Dr. Toni Sheehan (neurosurgery). Back pain should improve with weight loss. 5. Dysthymia  F34.1 300.4 buPROPion XL (WELLBUTRIN XL) 300 mg XL tablet          We will increase his dose of Wellbutrin to 300 mg daily. Discussed this should help with depression as well as food cravings. Patient was offered a choice/choices in the treatment plan today. Patient expresses understanding of the plan and agrees with recommendations. More than 33 mins spent face to face with patient going over current challenges with the program, new diet plan, water intake plan, and discussing physical activity routines. Also counseling and coordinating care and/or discussing treatment plans in reference to primary diagnosis of Morbid Obesity Body mass index is 40.28 kg/m². Follow up: 4 weeks     IChristo MD, personally performed the services described in this documentation as scribed by Melva Luther in my presence, and it is both accurate and complete.

## 2022-11-03 NOTE — PROGRESS NOTES
Weight Management. 1 month follow up. 1. Have you been to the ER, urgent care clinic since your last visit? Hospitalized since your last visit? No    2. Have you seen or consulted any other health care providers outside of the [address] [place] [address] since your last visit? Include any pap smears or colon screening.  No    BMI - 40.3

## 2022-11-21 ENCOUNTER — CLINICAL SUPPORT (OUTPATIENT)
Dept: SURGERY | Age: 47
End: 2022-11-21

## 2022-11-21 VITALS
RESPIRATION RATE: 18 BRPM | SYSTOLIC BLOOD PRESSURE: 125 MMHG | TEMPERATURE: 97.7 F | OXYGEN SATURATION: 97 % | DIASTOLIC BLOOD PRESSURE: 88 MMHG | BODY MASS INDEX: 39.01 KG/M2 | HEART RATE: 84 BPM | WEIGHT: 257.4 LBS | HEIGHT: 68 IN

## 2022-11-21 DIAGNOSIS — E66.01 SEVERE OBESITY (HCC): Primary | ICD-10-CM

## 2022-11-21 NOTE — PROGRESS NOTES
Weight Management. 1. Have you been to the ER, urgent care clinic since your last visit? Hospitalized since your last visit? No    2. Have you seen or consulted any other health care providers outside of the [address] [place] [address] since your last visit? Include any pap smears or colon screening. No    Patient attended triage but did not bring homework form. Patient instructed to email or fax completed homework form to us. Patient informed that not bringing the homework form can result in not being seen next time.

## 2022-12-26 DIAGNOSIS — I10 ESSENTIAL HYPERTENSION: ICD-10-CM

## 2022-12-29 RX ORDER — LOSARTAN POTASSIUM AND HYDROCHLOROTHIAZIDE 12.5; 5 MG/1; MG/1
TABLET ORAL
Qty: 90 TABLET | Refills: 0 | Status: SHIPPED | OUTPATIENT
Start: 2022-12-29

## 2023-03-15 DIAGNOSIS — F34.1 DYSTHYMIA: ICD-10-CM

## 2023-03-15 DIAGNOSIS — E66.01 MORBIDLY OBESE (HCC): ICD-10-CM

## 2023-03-15 RX ORDER — BUPROPION HYDROCHLORIDE 300 MG/1
TABLET ORAL
Qty: 30 TABLET | Refills: 2 | Status: SHIPPED | OUTPATIENT
Start: 2023-03-15

## 2023-07-19 RX ORDER — LOSARTAN POTASSIUM AND HYDROCHLOROTHIAZIDE 12.5; 5 MG/1; MG/1
1 TABLET ORAL DAILY
Qty: 90 TABLET | Refills: 0 | Status: SHIPPED | OUTPATIENT
Start: 2023-07-19

## (undated) DEVICE — SOLUTION IV 250ML 0.9% SOD CHL CLR INJ FLX BG CONT PRT CLSR

## (undated) DEVICE — SPONGE GZ W4XL4IN COT RADPQ HIGHLY ABSRB

## (undated) DEVICE — SUTURE VCRL SZ 2-0 L18IN ABSRB UD L26MM CP-2 1/2 CIR REV J762D

## (undated) DEVICE — STERILE POLYISOPRENE POWDER-FREE SURGICAL GLOVES WITH EMOLLIENT COATING: Brand: PROTEXIS

## (undated) DEVICE — ELECTRODE BLDE L4IN NONINSULATED EDGE

## (undated) DEVICE — SUTURE VCRL SZ 0 L18IN ABSRB VLT L36MM CT-1 1/2 CIR J740D

## (undated) DEVICE — KIT EVAC 0.13IN RECT TB DIA10FR 400CC PVC 3 SPR Y CONN DRN

## (undated) DEVICE — STERILE-Z SURGICAL PATIENT COVERS CLEAR POLYETHYLENE STERILE UNIVERSAL FIT 10 PER CASE: Brand: STERILE-Z

## (undated) DEVICE — GARMENT,MEDLINE,DVT,INT,CALF,MED, GEN2: Brand: MEDLINE

## (undated) DEVICE — SEALANT HEMOSTAT W/THROM 8ML -- SURGIFLO MATRIX

## (undated) DEVICE — TOTAL TRAY, 16FR 10ML SIL FOLEY, URN: Brand: MEDLINE

## (undated) DEVICE — STRAP,POSITIONING,KNEE/BODY,FOAM,4X60": Brand: MEDLINE

## (undated) DEVICE — INTENDED FOR TISSUE SEPARATION, AND OTHER PROCEDURES THAT REQUIRE A SHARP SURGICAL BLADE TO PUNCTURE OR CUT.: Brand: BARD-PARKER ® CARBON RIB-BACK BLADES

## (undated) DEVICE — DRAPE XR C ARM 41X74IN LF --

## (undated) DEVICE — HANDLE LT SNAP ON ULT DURABLE LENS FOR TRUMPF ALC DISPOSABLE

## (undated) DEVICE — SPONGE GZ W4XL4IN COT 12 PLY TYP VII WVN C FLD DSGN

## (undated) DEVICE — ASTOUND STANDARD SURGICAL GOWN, XXL: Brand: CONVERTORS

## (undated) DEVICE — TOOL 14MH30 LEGEND 14CM 3MM: Brand: MIDAS REX ™

## (undated) DEVICE — CANNULA INJ L2.5IN BLNT TIP 3MM CLR BODY W/ 1 W VLV DLP

## (undated) DEVICE — PROBE SURG L95IN TIP L7MM S STL CERV BALL TIP ANG SGL END

## (undated) DEVICE — BONE WAX WHITE: Brand: BONE WAX WHITE

## (undated) DEVICE — DRAPE C-ARMOUR C-ARM KIT --

## (undated) DEVICE — SOLUTION SURG PREP 26 CC PURPREP

## (undated) DEVICE — INFECTION CONTROL KIT SYS

## (undated) DEVICE — SUTURE MCRYL SZ 4-0 L27IN ABSRB UD L19MM PS-2 1/2 CIR PRIM Y426H

## (undated) DEVICE — BIPOLAR IRRIGATOR INTEGRATED TUBING AND BIPOLAR CORD SET, DISPOSABLE

## (undated) DEVICE — POSITIONER HD REST FOAM CMFRT TCH

## (undated) DEVICE — LAMINECTOMY RICHMOND-LF: Brand: MEDLINE INDUSTRIES, INC.

## (undated) DEVICE — COVER,TABLE,HEAVY DUTY,60"X90",STRL: Brand: MEDLINE